# Patient Record
Sex: FEMALE | Race: WHITE | Employment: UNEMPLOYED | URBAN - METROPOLITAN AREA
[De-identification: names, ages, dates, MRNs, and addresses within clinical notes are randomized per-mention and may not be internally consistent; named-entity substitution may affect disease eponyms.]

---

## 2017-01-20 ENCOUNTER — OFFICE VISIT (OUTPATIENT)
Dept: INTERNAL MEDICINE CLINIC | Age: 57
End: 2017-01-20

## 2017-01-20 VITALS
OXYGEN SATURATION: 96 % | SYSTOLIC BLOOD PRESSURE: 146 MMHG | TEMPERATURE: 98.7 F | WEIGHT: 266 LBS | DIASTOLIC BLOOD PRESSURE: 88 MMHG | BODY MASS INDEX: 50.22 KG/M2 | HEIGHT: 61 IN | RESPIRATION RATE: 20 BRPM | HEART RATE: 87 BPM

## 2017-01-20 DIAGNOSIS — R10.2 PELVIC PAIN: Primary | ICD-10-CM

## 2017-01-20 DIAGNOSIS — R31.9 BLOOD IN URINE: ICD-10-CM

## 2017-01-20 DIAGNOSIS — R74.8 ELEVATED LIVER ENZYMES: ICD-10-CM

## 2017-01-20 LAB
BILIRUB UR QL STRIP: NEGATIVE
GLUCOSE UR-MCNC: NEGATIVE MG/DL
KETONES P FAST UR STRIP-MCNC: NEGATIVE MG/DL
PH UR STRIP: 5.5 [PH] (ref 4.6–8)
PROT UR QL STRIP: ABNORMAL MG/DL
SP GR UR STRIP: 1.01 (ref 1–1.03)
UA UROBILINOGEN AMB POC: ABNORMAL (ref 0.2–1)
URINALYSIS CLARITY POC: ABNORMAL
URINALYSIS COLOR POC: YELLOW
URINE BLOOD POC: ABNORMAL
URINE LEUKOCYTES POC: ABNORMAL
URINE NITRITES POC: NEGATIVE

## 2017-01-20 NOTE — PROGRESS NOTES
Davida Nance Azucena Chan is a 56 y.o. female and presents with Pelvic Pain  . Abdominal pain  Pt reports mid to left  lower abdomen pelvic pain severe which comes and goes. She had bad spell in December with an increased temperature but  it resolved on its on. She presents today with left lower abdominal pain and paraspinal low back pain. She has subjective fevers and took tylenol with mild relief. No pain on urination but does see blood in underwear. She notes she sees blood on her tissue sporatically about every 2-3 weeks. She thinks the last time which was December 24 was more prominent and heavy. Since h    Subjective:  wound  Improving  Not painful becoming a little itchy. She is using cerva lotion and no longer draining  She is not using mupirocin      SUBJECTIVE: Azucena Chan is a 56 y.o. female here for follow up of hypothyroidism.s   She is symptomatic with weakness, tired, depressed and crying    Lab Results   Component Value Date/Time    TSH 4.630 07/14/2016 02:19 PM     Thyroid ROS: fatigue, weight gain, constipation, swelling, feeling slow, losing hair and anxiousness. + edema  Pt will stay on levothyroxine 112 mcg as she was recently checked by endocrine. Dr. Zaria Grey is following pt      Htn  Subjective:   Azucena Chan is a 56 y.o. female with hypertension. Hypertension ROS: taking medications as instructed, no medication side effects noted, no TIA's, no chest pain on exertion, no dyspnea on exertion, no swelling of ankles. New concerns: edema, she has not used the lasix.      Cholesterol  She is now taking medication zocor  No muscle aches    Anxiety  She reports seeing dr. Martha Wisdom but he is retiring  Baseline stable per her reports  She notes her  can be verbally abusive and drinks etoh      Review of Systems  Constitutional: negative for fevers, chills, anorexia and weight loss  Eyes:   negative for visual disturbance and irritation  ENT:   negative for tinnitus,sore throat,nasal congestion,ear pains. hoarseness  Respiratory:  negative for cough, hemoptysis, dyspnea,wheezing  CV:   negative for chest pain, palpitations, lower extremity edema  GI:   negative for nausea, vomiting, diarrhea, abdominal pain,melena  Endo:               negative for polyuria,polydipsia,polyphagia,heat intolerance  Genitourinary: negative for frequency, dysuria and hematuria  Integument:  negative for rash and pruritus  Hematologic:  negative for easy bruising and gum/nose bleeding  Musculoskel: negative for myalgias, arthralgias, back pain, muscle weakness, joint pain  Neurological:  negative for headaches, dizziness, vertigo, memory problems and gait   Behavl/Psych: negative for feelings of anxiety, depression, mood changes    Past Medical History   Diagnosis Date    Anxiety     Depression 8/19/2010    HTN (hypertension)     Hyperlipidemia LDL goal < 130     Hypothyroid     Psychotic disorder      Severe depression and anxiety    Tobacco abuse      Past Surgical History   Procedure Laterality Date    Hx appendectomy      Hx dilation and curettage       Social History     Social History    Marital status:      Spouse name: N/A    Number of children: N/A    Years of education: N/A     Social History Main Topics    Smoking status: Current Every Day Smoker     Packs/day: 1.00    Smokeless tobacco: Never Used      Comment:  1 ppd/40+ years    Alcohol use No    Drug use: No      Comment: 1 pack a day    Sexual activity: No     Other Topics Concern    None     Social History Narrative            2 children        Not employed/filing for disability    Stopped working after children were born 20 yo and 11 yo     History reviewed. No pertinent family history. Current Outpatient Prescriptions   Medication Sig Dispense Refill    simvastatin (ZOCOR) 10 mg tablet Take 1 Tab by mouth nightly.  90 Tab 1    levothyroxine (SYNTHROID) 112 mcg tablet Take  by mouth Daily (before breakfast).  dilTIAZem CD (CARDIZEM CD) 180 mg ER capsule Take 1 Cap by mouth daily. 90 Cap 3    venlafaxine-SR (EFFEXOR XR) 150 mg capsule Take  by mouth daily.  citalopram (CELEXA) 40 mg tablet Take 40 mg by mouth daily.  amphetamine-dextroamphetamine (ADDERALL) 20 mg tablet Take 2 Tabs by mouth three (3) times daily. 20 Tab 0    LORazepam (ATIVAN) 1 mg tablet Take 1 Tab by mouth every eight (8) hours as needed. 60 Tab 0    quetiapine (SEROQUEL) 25 mg tablet Take  by mouth. 1-2 at bedtime as needed for sleep       furosemide (LASIX) 20 mg tablet Take 1 Tab by mouth daily. 90 Tab 0    Cholecalciferol, Vitamin D3, 50,000 unit cap Take 1 Cap by mouth every seven (7) days. 12 Cap 0    mupirocin calcium (BACTROBAN) 2 % topical cream Apply  to affected area two (2) times a day.  15 g 0     Allergies   Allergen Reactions    Egg Other (comments)     Abdominal pain       Objective:  Visit Vitals    /88 (BP 1 Location: Left arm, BP Patient Position: Sitting)    Pulse 87    Temp 98.7 °F (37.1 °C) (Oral)    Resp 20    Ht 5' 1\" (1.549 m)    Wt 266 lb (120.7 kg)    LMP 07/13/2007    SpO2 96%    BMI 50.26 kg/m2        Physical Exam:   General appearance - alert, well appearing, and in no distress and obese,tangential though exam  Mental status - alert, oriented to person, place, and time  EYE-ALMA, EOMI, fundi normal, corneas normal, no foreign bodies, visual acuity normal both eyes  Nose - normal and patent, no erythema, discharge or polyps  Mouth - mucous membranes moist, pharynx normal without lesions  Neck - supple, no significant adenopathy   Chest - clear to auscultation, no wheezes, rales or rhonchi, symmetric air entry   Heart - normal rate, regular rhythm, normal S1, S2, no murmurs, rubs, clicks or gallops   Abdomen - soft, nontender, nondistended, no masses or organomegaly  Ext-peripheral pulses normal, no pedal edema, no clubbing or cyanosis,left shin, + 2+ edema, right lower leg quarter sized crusty lesion 2 cmx2 cm, left medial knee fold,  bilateral hyperpigmentation lower legs  Skin-Warm and dry. no hyperpigmentation, vitiligo, or suspicious lesions, tan skin, Neuro -alert, oriented, normal speech, no focal findings or movement disorder noted  Neck-normal C-spine, no tenderness, full ROM without pain  Gyn: vaginal atrophy noted, appeared to be blood at os      Nahum Natarajan was seen today for pelvic pain. Diagnoses and all orders for this visit:    Pelvic pain  Does have some left paraspinal pain but does not appear to be radiating to abdomen  Exam revealed atrophy but did note erythema at os  Given age will ask gyn to evaluate  -     AMB POC URINALYSIS DIP STICK AUTO W/O MICRO  -     CULTURE, URINE  -     URINALYSIS W/ RFLX MICROSCOPIC  -     REFERRAL TO OBSTETRICS AND GYNECOLOGY    Blood in urine  Requested clean catch urine discussed procedure with pt  -     CULTURE, URINE  -     URINALYSIS W/ RFLX MICROSCOPIC  -     Cancel: URINALYSIS W/ RFLX MICROSCOPIC  -     Cancel: CULTURE, URINE  -     REFERRAL TO OBSTETRICS AND GYNECOLOGY    Elevated liver enzymes  May be fatty liver  Cont to monitor  -     METABOLIC PANEL, COMPREHENSIVE        She is going to need another psychiatrist as Dr. Ed Sims is retiring    Follow up in 1 month or sooner if needed    This note will not be viewable in [address]

## 2017-01-20 NOTE — MR AVS SNAPSHOT
Visit Information Date & Time Provider Department Dept. Phone Encounter #  
 1/20/2017 11:00 AM Karen Kemp MD [facility] [address] [id number] Upcoming Health Maintenance Date Due Hepatitis C Screening 1960 Pneumococcal 19-64 Medium Risk (1 of 1 - PPSV23) 1/16/1981 PAP AKA CERVICAL CYTOLOGY 1/16/2012 FOBT Q 1 YEAR AGE 50-75 1/16/2012 BREAST CANCER SCRN MAMMOGRAM 6/4/2016 INFLUENZA AGE 9 TO ADULT 8/1/2016 DTaP/Tdap/Td series (2 - Td) 9/10/2025 Allergies as of 1/20/2017  Review Complete On: 1/20/2017 By: Karen Kemp MD  
  
 Severity Noted Reaction Type Reactions Egg  06/04/2014    Other (comments) Abdominal pain Current Immunizations  Reviewed on 9/10/2015 Name Date Tdap 9/10/2015 Not reviewed this visit You Were Diagnosed With   
  
 Codes Comments Pelvic pain    -  Primary ICD-10-CM: R10.2 ICD-9-CM: [id number] Blood in urine     ICD-10-CM: R31.9 ICD-9-CM: 599.70 Elevated liver enzymes     ICD-10-CM: R74.8 ICD-9-CM: 790.5 Vitals BP Pulse Temp Resp Height(growth percentile) Weight(growth percentile) 146/88 (BP 1 Location: Left arm, BP Patient Position: Sitting) 87 98.7 °F (37.1 °C) (Oral) 20 5' 1\" (1.549 m) 266 lb (120.7 kg) LMP SpO2 BMI OB Status Smoking Status 07/13/2007 96% 50.26 kg/m2 Postmenopausal Current Every Day Smoker Vitals History BMI and BSA Data Body Mass Index Body Surface Area  
 50.26 kg/m 2 2.28 m 2 Preferred Pharmacy Pharmacy Name Phone [id number] [place], [address] [phone] Your Updated Medication List  
  
   
This list is accurate as of: 1/20/17 12:18 PM.  Always use your most recent med list.  
  
  
  
  
 Cholecalciferol (Vitamin D3) 50,000 unit Cap Take 1 Cap by mouth every seven (7) days. citalopram 40 mg tablet Commonly known as:  Lenward Campanile Take 40 mg by mouth daily. dextroamphetamine-amphetamine 20 mg tablet Commonly known as:  ADDERALL Take 2 Tabs by mouth three (3) times daily. dilTIAZem  mg ER capsule Commonly known as:  CARDIZEM CD Take 1 Cap by mouth daily. EFFEXOR  mg capsule Generic drug:  venlafaxine-SR Take  by mouth daily. furosemide 20 mg tablet Commonly known as:  LASIX Take 1 Tab by mouth daily. LORazepam 1 mg tablet Commonly known as:  ATIVAN Take 1 Tab by mouth every eight (8) hours as needed. mupirocin calcium 2 % topical cream  
Commonly known as:  [employer] Apply  to affected area two (2) times a day. SEROquel 25 mg tablet Generic drug:  QUEtiapine Take  by mouth. 1-2 at bedtime as needed for sleep  
  
 simvastatin 10 mg tablet Commonly known as:  ZOCOR Take 1 Tab by mouth nightly. SYNTHROID 112 mcg tablet Generic drug:  levothyroxine Take  by mouth Daily (before breakfast). We Performed the Following AMB POC URINALYSIS DIP STICK AUTO W/O MICRO [20426 CPT(R)] CULTURE, URINE [id number] CPT(R)] CULTURE, URINE [id number] CPT(R)] METABOLIC PANEL, COMPREHENSIVE [22252 CPT(R)] REFERRAL TO OBSTETRICS AND GYNECOLOGY [REF51 Custom] Comments:  
 Please evaluate for post menopausal bleed, + atrophy on exam, erythema cervical os URINALYSIS W/ RFLX MICROSCOPIC [23122 CPT(R)] URINALYSIS W/ RFLX MICROSCOPIC [34280 CPT(R)] Referral Information Referral ID Referred By Referred To  
  
 [id number] Sayre АндрейFederico boswell MD   
   [address] [place] [address] Phone: [phone] Fax: [phone] Visits Status Start Date End Date 1 New Request 1/20/17 1/20/18 If your referral has a status of pending review or denied, additional information will be sent to support the outcome of this decision. Introducing \[facility] [place] [facility]\"" & HEALTH SERVICES! Dear Ortiz Jackson: Thank you for requesting a [employer] account. Our records indicate that you already have an active [employer] account. You can access your account anytime at [email or web address] Did you know that you can access your hospital and ER discharge instructions at any time in [employer]? You can also review all of your test results from your hospital stay or ER visit. Additional Information If you have questions, please visit the Frequently Asked Questions section of the [employer] website at [email or web address] Remember, [employer] is NOT to be used for urgent needs. For medical emergencies, dial 911. Now available from your iPhone and Android! Please provide this summary of care documentation to your next provider. Your primary care clinician is listed as Jorge Michaels. If you have any questions after today's visit, please call [phone].

## 2017-01-21 LAB
ALBUMIN SERPL-MCNC: 4.3 G/DL (ref 3.5–5.5)
ALBUMIN/GLOB SERPL: 1.8 {RATIO} (ref 1.1–2.5)
ALP SERPL-CCNC: 129 IU/L (ref 39–117)
ALT SERPL-CCNC: 16 IU/L (ref 0–32)
AST SERPL-CCNC: 21 IU/L (ref 0–40)
BILIRUB SERPL-MCNC: 0.6 MG/DL (ref 0–1.2)
BUN SERPL-MCNC: 13 MG/DL (ref 6–24)
BUN/CREAT SERPL: 14 (ref 9–23)
CALCIUM SERPL-MCNC: 9.1 MG/DL (ref 8.7–10.2)
CHLORIDE SERPL-SCNC: 97 MMOL/L (ref 96–106)
CO2 SERPL-SCNC: 24 MMOL/L (ref 18–29)
CREAT SERPL-MCNC: 0.92 MG/DL (ref 0.57–1)
GLOBULIN SER CALC-MCNC: 2.4 G/DL (ref 1.5–4.5)
GLUCOSE SERPL-MCNC: 84 MG/DL (ref 65–99)
POTASSIUM SERPL-SCNC: 4.5 MMOL/L (ref 3.5–5.2)
PROT SERPL-MCNC: 6.7 G/DL (ref 6–8.5)
SODIUM SERPL-SCNC: 139 MMOL/L (ref 134–144)

## 2017-01-23 LAB
APPEARANCE UR: ABNORMAL
BACTERIA #/AREA URNS HPF: ABNORMAL /[HPF]
BACTERIA UR CULT: ABNORMAL
BILIRUB UR QL STRIP: NEGATIVE
CASTS URNS QL MICRO: ABNORMAL /LPF
COLOR UR: YELLOW
EPI CELLS #/AREA URNS HPF: ABNORMAL /HPF
GLUCOSE UR QL: NEGATIVE
HGB UR QL STRIP: ABNORMAL
KETONES UR QL STRIP: NEGATIVE
LEUKOCYTE ESTERASE UR QL STRIP: ABNORMAL
MICRO URNS: ABNORMAL
MUCOUS THREADS URNS QL MICRO: PRESENT
NITRITE UR QL STRIP: NEGATIVE
PH UR STRIP: 6 [PH] (ref 5–7.5)
PROT UR QL STRIP: ABNORMAL
RBC #/AREA URNS HPF: ABNORMAL /HPF
SP GR UR: 1.02 (ref 1–1.03)
UROBILINOGEN UR STRIP-MCNC: 0.2 MG/DL (ref 0.2–1)
WBC #/AREA URNS HPF: >30 /HPF

## 2017-01-23 RX ORDER — SULFAMETHOXAZOLE AND TRIMETHOPRIM 800; 160 MG/1; MG/1
1 TABLET ORAL 2 TIMES DAILY
Qty: 14 TAB | Refills: 0 | Status: SHIPPED | OUTPATIENT
Start: 2017-01-23 | End: 2017-02-02

## 2017-01-24 NOTE — PROGRESS NOTES
Called pt at her home number and mobile number, she did not answer, left v/m for pt stating PCP sent in an abx for her.

## 2017-01-24 NOTE — PROGRESS NOTES
Please contact pt to let her know I sent in an antibiotic.   I already left her on a message on her voice mail so want to ensure she is aware. [email or web address]

## 2017-02-02 ENCOUNTER — OFFICE VISIT (OUTPATIENT)
Dept: OBGYN CLINIC | Age: 57
End: 2017-02-02

## 2017-02-02 VITALS
WEIGHT: 263.4 LBS | DIASTOLIC BLOOD PRESSURE: 93 MMHG | BODY MASS INDEX: 49.73 KG/M2 | HEIGHT: 61 IN | SYSTOLIC BLOOD PRESSURE: 153 MMHG

## 2017-02-02 DIAGNOSIS — N76.1 CHRONIC VAGINITIS: Primary | ICD-10-CM

## 2017-02-02 RX ORDER — HYDROCODONE BITARTRATE AND ACETAMINOPHEN 5; 325 MG/1; MG/1
TABLET ORAL
Refills: 0 | COMMUNITY
Start: 2016-10-28 | End: 2017-02-02

## 2017-02-02 RX ORDER — CITALOPRAM 20 MG/1
TABLET, FILM COATED ORAL
COMMUNITY
Start: 2017-01-15 | End: 2017-06-12 | Stop reason: SDUPTHER

## 2017-02-02 RX ORDER — ERGOCALCIFEROL 1.25 MG/1
CAPSULE ORAL
Refills: 0 | COMMUNITY
Start: 2016-10-26 | End: 2017-02-02

## 2017-02-02 NOTE — PATIENT INSTRUCTIONS
Pelvic Exam: Care Instructions  Your Care Instructions    When your doctor examines all of your pelvic organs, it's called a pelvic exam. Two good reasons to have this kind of exam are to check for sexually transmitted infections (STIs) and to get a Pap test. A Pap test is also called a Pap smear. It checks for early changes that can lead to cancer of the cervix. Sometimes a pelvic exam is part of a regular checkup. In this case, you can do some things to make your test results as accurate as possible. · Try to schedule the exam when you don't have your period. · Don't use douches, tampons, or vaginal medicines, sprays, or powders for 24 hours before your exam.  · Don't have sex for 24 hours before your exam.  Other times, women have this kind of exam at any time of the month. This is because they have pelvic pain, bleeding, or discharge. Or they may have another pelvic problem. Before your exam, it's important to share some information with your doctor. For example, if you are a survivor of rape or sexual abuse, you can talk about any concerns you may have. Your doctor will also want to know if you are pregnant or use birth control. And he or she will want to hear about any problems, surgeries, or procedures you have had in your pelvic area. You will also need to tell your doctor when your last period was. Follow-up care is a key part of your treatment and safety. Be sure to make and go to all appointments, and call your doctor if you are having problems. It's also a good idea to know your test results and keep a list of the medicines you take. How is a pelvic exam done? · During a pelvic exam, you will:  ¨ Take off your clothes below the waist. You will get a paper or cloth cover to put over the lower half of your body. Kat Noriegaors on your back on an exam table. Your feet will be raised above you. Stirrups will support your feet. · The doctor will:  Mayra Mcknightsper you to relax your knees.  Your knees need to lean out, toward the walls. ¨ Check the opening of your vagina for sores or swelling. ¨ Gently put a tool called a speculum into your vagina. It opens the vagina a little bit. You will feel some pressure. But if you are relaxed, it will not hurt. It lets your doctor see inside the vagina. ¨ Use a small brush, spatula, or swab to get a sample of cells, if you are having a Pap test or culture. The doctor then removes the speculum. ¨ Put on gloves and put one or two fingers of one hand into your vagina. The other hand goes on your lower belly. This lets your doctor feel your pelvic organs. You will probably feel some pressure. Try to stay relaxed. ¨ Put one gloved finger into your rectum and one into your vagina, if needed. This can also help check your pelvic organs. This exam takes about 10 minutes. At the end, you will get a washcloth or tissue to clean your vaginal area. It's normal to have some discharge after this exam. You can then get dressed. Some test results may be ready right away. But results from a culture or a Pap test may take several days or a few weeks. Why should you have a pelvic exam?  · You want to have recommended screening tests. This includes a Pap test.  · You think you have a vaginal infection. Signs include itching, burning, or unusual discharge. · You might have been exposed to a sexually transmitted infection (STI), such as chlamydia or herpes. · You have vaginal bleeding that is not part of your normal menstrual period. · You have pain in your belly or pelvis. · You have been sexually assaulted. A pelvic exam lets your doctor collect evidence and check for STIs. · You are pregnant. · You are having trouble getting pregnant. What are the risks of a pelvic exam?  There are no risks from a pelvic exam.  When should you call for help?   Watch closely for changes in your health, and be sure to contact your doctor if:  · You have heavy bleeding or discharge from your vagina after the exam.  Where can you learn more? Go to http://lavelle-rhys.info/. Enter L731 in the search box to learn more about \"Pelvic Exam: Care Instructions. \"  Current as of: February 25, 2016  Content Version: 11.1  © 6667-9037 [employer]. Care instructions adapted under license by [employer] (which disclaims liability or warranty for this information). If you have questions about a medical condition or this instruction, always ask your healthcare professional. [address] any warranty or liability for your use of this information.

## 2017-02-02 NOTE — PROGRESS NOTES
Postmenopausal bleeding note      Femi Ceballos is a 56 y.o. female who complains of vaginal bleeding after menopause. She became menopausal years ago. She has had vaginal bleeding which she describes as light lasting up to 5 months. Pad or tampon count: changes as needed. Associated symptoms include lower abdominal pain. US today shows normal uterus and endometrial lining is less than 4 mm. Ovaries normal.    Alleviating factors: none    Aggravating factors: none      The patient is not currently sexually active. Last Pap smear:unknown    Her relevant past medical history:   Past Medical History   Diagnosis Date    Anxiety     Depression 8/19/2010    HTN (hypertension)     Hyperlipidemia LDL goal < 130     Hypothyroid     Psychotic disorder      Severe depression and anxiety    Tobacco abuse         Past Surgical History   Procedure Laterality Date    Hx appendectomy      Hx dilation and curettage       Social History     Occupational History    Not on file. Social History Main Topics    Smoking status: Current Every Day Smoker     Packs/day: 1.00    Smokeless tobacco: Never Used      Comment:  1 ppd/40+ years    Alcohol use No    Drug use: No      Comment: 1 pack a day    Sexual activity: No     No family history on file. Allergies   Allergen Reactions    Egg Other (comments)     Abdominal pain     Prior to Admission medications    Medication Sig Start Date End Date Taking? Authorizing Provider   citalopram (CELEXA) 20 mg tablet  1/15/17  Yes Historical Provider   simvastatin (ZOCOR) 10 mg tablet Take 1 Tab by mouth nightly. 11/21/16  Yes Rosalind Jensen MD   levothyroxine (SYNTHROID) 112 mcg tablet Take  by mouth Daily (before breakfast). Yes Historical Provider   dilTIAZem CD (CARDIZEM CD) 180 mg ER capsule Take 1 Cap by mouth daily.  10/26/16  Yes Rosalind Jensen MD   Cholecalciferol, Vitamin D3, 50,000 unit cap Take 1 Cap by mouth every seven (7) days. 10/26/16  Yes Kian Dee MD   venlafaxine-SR (EFFEXOR XR) 150 mg capsule Take  by mouth daily. Yes Historical Provider   amphetamine-dextroamphetamine (ADDERALL) 20 mg tablet Take 2 Tabs by mouth three (3) times daily. 8/17/12  Yes Angelina Lion MD   LORazepam (ATIVAN) 1 mg tablet Take 1 Tab by mouth every eight (8) hours as needed. 7/2/12  Yes Angelina Lion MD   quetiapine (SEROQUEL) 25 mg tablet Take  by mouth. 1-2 at bedtime as needed for sleep    Yes Historical Provider   ergocalciferol (ERGOCALCIFEROL) 50,000 unit capsule TAKE ONE CAPSULE BY MOUTH EVERY 7 DAYS 10/26/16   Historical Provider   HYDROcodone-acetaminophen (NORCO) 5-325 mg per tablet TAKE 1 TABLET BY MOUTH EVERY 6 HOURS AS NEEDED FOR PAIN 10/28/16   Historical Provider   trimethoprim-sulfamethoxazole (BACTRIM DS, SEPTRA DS) 160-800 mg per tablet Take 1 Tab by mouth two (2) times a day. 1/23/17   Kian Dee MD   furosemide (LASIX) 20 mg tablet Take 1 Tab by mouth daily. 11/7/16   Kian eDe MD   mupirocin calcium (BACTROBAN) 2 % topical cream Apply  to affected area two (2) times a day. 8/4/16   Kian Dee MD   citalopram (CELEXA) 40 mg tablet Take 40 mg by mouth daily.  5/22/14   Historical Provider        Review of Systems - History obtained from the patient  Constitutional: negative for weight loss, fever, night sweats  HEENT: negative for hearing loss, earache, congestion, snoring, sorethroat  CV: negative for chest pain, palpitations, edema  Resp: negative for cough, shortness of breath, wheezing  Breast: negative for breast lumps, nipple discharge, galactorrhea  GI: negative for change in bowel habits, abdominal pain, black or bloody stools  : negative for frequency, dysuria, hematuria  MSK: negative for back pain, joint pain, muscle pain  Skin: negative for itching, rash, hives  Neuro: negative for dizziness, headache, confusion, weakness  Psych: negative for anxiety, depression, change in mood  Heme/lymph: negative for bleeding, bruising, pallor      Objective:  Visit Vitals    BP (!) 153/93 (BP 1 Location: Right arm, BP Patient Position: Sitting)    Ht 5' 1\" (1.549 m)    Wt 263 lb 6.4 oz (119.5 kg)    LMP 07/13/2007    BMI 49.77 kg/m2       Physical Exam:   PHYSICAL EXAMINATION    Constitutional  · Appearance: well-nourished, well developed, alert, in no acute distress    Gastrointestinal  · Abdominal Examination: abdomen non-tender to palpation, normal bowel sounds, no masses present  · Liver and spleen: no hepatomegaly present, spleen not palpable  · Hernias: no hernias identified    Genitourinary  · External Genitalia: normal appearance for age, no discharge present, no tenderness present, no inflammatory lesions present, no masses present, no atrophy present  · Vagina: normal vaginal vault without central or paravaginal defects, no discharge present, no inflammatory lesions present, no masses present  · Bladder: non-tender to palpation  · Urethra: appears normal  · Cervix: normal with some cervicitis evident  · Uterus: normal size, shape and consistency  · Adnexa: no adnexal tenderness present, no adnexal masses present  · Perineum: perineum within normal limits, no evidence of trauma, no rashes or skin lesions present  · Anus: anus within normal limits, no hemorrhoids present  · Inguinal Lymph Nodes: no lymphadenopathy present    Skin  · General Inspection: rash and ulcerative lesion on right lower leg    Neurologic/Psychiatric  · Mental Status:  · Orientation: grossly oriented to person, place and time  · Mood and Affect: mood normal, affect appropriate    Assessment:   Postmenopausal bleeding likely from cervicitis; US shows endometrial lining less than 4 mm    Plan:   Pap sent  Cultures sent from cervix and vagina  She declines endometrial biopsy  She will see GI/urology for her ongoing abdominal pain        Instructions given to pt.

## 2017-02-05 LAB — BACTERIA GENITAL AEROBE CULT: NORMAL

## 2017-02-06 LAB
A VAGINAE DNA VAG QL NAA+PROBE: NORMAL SCORE
BVAB2 DNA VAG QL NAA+PROBE: NORMAL SCORE
C ALBICANS DNA VAG QL NAA+PROBE: NEGATIVE
C GLABRATA DNA VAG QL NAA+PROBE: NEGATIVE
C TRACH RRNA SPEC QL NAA+PROBE: NEGATIVE
MEGA1 DNA VAG QL NAA+PROBE: NORMAL SCORE
N GONORRHOEA RRNA SPEC QL NAA+PROBE: NEGATIVE
T VAGINALIS RRNA SPEC QL NAA+PROBE: NEGATIVE

## 2017-02-08 LAB
CYTOLOGIST CVX/VAG CYTO: NORMAL
CYTOLOGY CVX/VAG DOC THIN PREP: NORMAL
CYTOLOGY HISTORY:: NORMAL
DX ICD CODE: NORMAL
HPV I/H RISK 1 DNA CVX QL PROBE+SIG AMP: NEGATIVE
Lab: NORMAL
OTHER STN SPEC: NORMAL
PATH REPORT.FINAL DX SPEC: NORMAL
STAT OF ADQ CVX/VAG CYTO-IMP: NORMAL

## 2017-02-09 RX ORDER — CLINDAMYCIN PHOSPHATE 20 MG/G
1 CREAM VAGINAL
Qty: 40 G | Refills: 0 | Status: SHIPPED | OUTPATIENT
Start: 2017-02-09 | End: 2017-02-16

## 2017-02-09 NOTE — TELEPHONE ENCOUNTER
----- Message from Sophia Blanco MD sent at 2/9/2017 10:28 AM EST -----  Pap and all cultures are negative. She had what appeared to be some inflammation on the cervix likely causing her spotting. I recommend using and antibiotic cream, Cleocin vaginal cream QHS for 7 nights, to treat the suspected inflammation. If she continues to see spotting in the next few months then RTO for repeat exam and endometrial biopsy.

## 2017-02-09 NOTE — PROGRESS NOTES
Pap and all cultures are negative. She had what appeared to be some inflammation on the cervix likely causing her spotting. I recommend using and antibiotic cream, Cleocin vaginal cream QHS for 7 nights, to treat the suspected inflammation. If she continues to see spotting in the next few months then RTO for repeat exam and endometrial biopsy.

## 2017-02-09 NOTE — TELEPHONE ENCOUNTER
Patient notified of MD keys's     Patient had multiple question regarding the wording of her medical record, including the use of the word grossly in terms of her orientation. I assured the patient that the medical terminology was accurate and I reviewed the visit as she was discussing her concerns. The note appears appropriate and does not include misused language. Patient also questioning what type of cream is Cleocin. I reviewed the [facility] entry with the patient. She also was concerned about a biopsy and having \"something snapped off. \" I explained how an endometrial biopsy was done and also advised her that if she does need to have it done, to take NSAID's or tylenol before procedure.     Verbalized understanding    Medication pended for signature and pharmacy verified

## 2017-02-15 NOTE — TELEPHONE ENCOUNTER
Patient's doctor retired in December and hasn't been able to get her medication refilled. Patient  to see her Dr. Reji Lawrence with [facility] on March 2nd and would like to have this just for 30 days. She took her last pill today. Patient would like a call back this afternoon if this won't be filled.   [phone]

## 2017-02-16 RX ORDER — DEXTROAMPHETAMINE SACCHARATE, AMPHETAMINE ASPARTATE, DEXTROAMPHETAMINE SULFATE AND AMPHETAMINE SULFATE 5; 5; 5; 5 MG/1; MG/1; MG/1; MG/1
40 TABLET ORAL 3 TIMES DAILY
Qty: 160 TAB | Refills: 0 | Status: SHIPPED | OUTPATIENT
Start: 2017-02-16 | End: 2017-03-07 | Stop reason: SDUPTHER

## 2017-02-17 ENCOUNTER — TELEPHONE (OUTPATIENT)
Dept: INTERNAL MEDICINE CLINIC | Age: 57
End: 2017-02-17

## 2017-02-17 NOTE — TELEPHONE ENCOUNTER
----- Message from Ayleen Roach sent at 2/17/2017  1:09 PM EST -----  Regarding: /telephone  Pt is calling in reference to a situation she called about on Wednesday. Dr. Suraj Urena wrote a prescription for the pt that was left at the  for the pt to . Pt appreciates the time that  [address] [place] [address] and the nurse took to help her,and pt said, \"Thank  you so much\". Pt no longer needs the prescription. Pt was seen by  [facility] and a physician there was able to prescribe the medication. Pt would a return phone call to confirm that this message was received. Pt can be reached at [phone], please leave a message if there is no answer.

## 2017-03-07 ENCOUNTER — TELEPHONE (OUTPATIENT)
Dept: INTERNAL MEDICINE CLINIC | Age: 57
End: 2017-03-07

## 2017-03-07 NOTE — TELEPHONE ENCOUNTER
----- Message from Reyes Peacock sent at 3/7/2017  1:24 PM EST -----  Regarding: Dr Kaley Brown  Pt's mom Bay Arce would like to speak to the doctor, regarding her daughter, please call [phone]

## 2017-03-08 RX ORDER — DEXTROAMPHETAMINE SACCHARATE, AMPHETAMINE ASPARTATE, DEXTROAMPHETAMINE SULFATE AND AMPHETAMINE SULFATE 5; 5; 5; 5 MG/1; MG/1; MG/1; MG/1
40 TABLET ORAL 3 TIMES DAILY
Qty: 160 TAB | Refills: 0 | Status: SHIPPED | OUTPATIENT
Start: 2017-03-08 | End: 2017-04-18 | Stop reason: SDUPTHER

## 2017-03-13 ENCOUNTER — DOCUMENTATION ONLY (OUTPATIENT)
Dept: INTERNAL MEDICINE CLINIC | Age: 57
End: 2017-03-13

## 2017-03-16 ENCOUNTER — OFFICE VISIT (OUTPATIENT)
Dept: INTERNAL MEDICINE CLINIC | Age: 57
End: 2017-03-16

## 2017-03-16 VITALS
RESPIRATION RATE: 22 BRPM | TEMPERATURE: 98.6 F | WEIGHT: 268 LBS | SYSTOLIC BLOOD PRESSURE: 150 MMHG | HEART RATE: 86 BPM | HEIGHT: 61 IN | BODY MASS INDEX: 50.6 KG/M2 | DIASTOLIC BLOOD PRESSURE: 92 MMHG | OXYGEN SATURATION: 96 %

## 2017-03-16 DIAGNOSIS — I10 ESSENTIAL HYPERTENSION: ICD-10-CM

## 2017-03-16 DIAGNOSIS — E78.00 HYPERCHOLESTEREMIA: ICD-10-CM

## 2017-03-16 DIAGNOSIS — F98.8 ADD (ATTENTION DEFICIT DISORDER): Primary | ICD-10-CM

## 2017-03-16 NOTE — PROGRESS NOTES
Talisha Santamaria Jovan Funes is a 56 y.o. female and presents with Medication Evaluation  . Pt presents to follow up on psych meds and htn. Add  Dr. James Baker has retired. She saw Dr. Claude Duval but he wanted to change her meds at the first meeting. She has refills of her meds and will see Dr. Samra Maynard. No si/no hi and has been stable for the past year. She is currently taking medications as dr. Villa Never rx'd. Abdominal pain  No complaints    Subjective:  wound  improved      SUBJECTIVE: Jovan Funes is a 56 y.o. female here for follow up of hypothyroidism.s   She is symptomatic with weakness, tired, depressed and crying    Lab Results   Component Value Date/Time    TSH 4.630 07/14/2016 02:19 PM     Thyroid ROS: fatigue, weight gain, constipation, swelling, feeling slow, losing hair and anxiousness. + edema  Pt will stay on levothyroxine 112 mcg as she was recently checked by endocrine. Dr. Allison Plan is following pt      Htn  Subjective:   Jovan Funes is a 56 y.o. female with hypertension. Hypertension ROS: taking medications as instructed, no medication side effects noted, no TIA's, no chest pain on exertion, no dyspnea on exertion, no swelling of ankles. New concerns: none    Cholesterol  She is now taking medication zocor  No muscle aches          Review of Systems  Constitutional: negative for fevers, chills, anorexia and weight loss  Eyes:   negative for visual disturbance and irritation  ENT:   negative for tinnitus,sore throat,nasal congestion,ear pains. hoarseness  Respiratory:  negative for cough, hemoptysis, dyspnea,wheezing  CV:   negative for chest pain, palpitations, lower extremity edema  GI:   negative for nausea, vomiting, diarrhea, abdominal pain,melena  Endo:               negative for polyuria,polydipsia,polyphagia,heat intolerance  Genitourinary: negative for frequency, dysuria and hematuria  Integument:  negative for rash and pruritus  Hematologic:  negative for easy bruising and gum/nose bleeding  Musculoskel: negative for myalgias, arthralgias, back pain, muscle weakness, joint pain  Neurological:  negative for headaches, dizziness, vertigo, memory problems and gait   Behavl/Psych: negative for feelings of anxiety, depression, mood changes    Past Medical History:   Diagnosis Date    Anxiety     Depression 8/19/2010    HTN (hypertension)     Hyperlipidemia LDL goal < 130     Hypothyroid     Psychotic disorder     Severe depression and anxiety    Tobacco abuse      Past Surgical History:   Procedure Laterality Date    HX APPENDECTOMY      HX DILATION AND CURETTAGE       Social History     Social History    Marital status:      Spouse name: N/A    Number of children: N/A    Years of education: N/A     Social History Main Topics    Smoking status: Current Every Day Smoker     Packs/day: 1.00    Smokeless tobacco: Never Used      Comment:  1 ppd/40+ years    Alcohol use No    Drug use: No      Comment: 1 pack a day    Sexual activity: No     Other Topics Concern    None     Social History Narrative            2 children        Not employed/filing for disability    Stopped working after children were born 24 yo and 13 yo     No family history on file. Current Outpatient Prescriptions   Medication Sig Dispense Refill    dextroamphetamine-amphetamine (ADDERALL) 20 mg tablet Take 2 Tabs (40 mg total) by mouth three (3) times dailyEarliest Fill Date: 3/8/17. 160 Tab 0    citalopram (CELEXA) 20 mg tablet       simvastatin (ZOCOR) 10 mg tablet Take 1 Tab by mouth nightly. 90 Tab 1    levothyroxine (SYNTHROID) 112 mcg tablet Take  by mouth Daily (before breakfast).  dilTIAZem CD (CARDIZEM CD) 180 mg ER capsule Take 1 Cap by mouth daily. 90 Cap 3    venlafaxine-SR (EFFEXOR XR) 150 mg capsule Take  by mouth daily.  LORazepam (ATIVAN) 1 mg tablet Take 1 Tab by mouth every eight (8) hours as needed.  60 Tab 0    quetiapine (SEROQUEL) 25 mg tablet Take  by mouth. 1-2 at bedtime as needed for sleep       Cholecalciferol, Vitamin D3, 50,000 unit cap Take 1 Cap by mouth every seven (7) days. 12 Cap 0     Allergies   Allergen Reactions    Egg Other (comments)     Abdominal pain       Objective:  Visit Vitals    BP (!) 150/92 (BP 1 Location: Left arm, BP Patient Position: Sitting)    Pulse 86    Temp 98.6 °F (37 °C) (Oral)    Resp 22    Ht 5' 1\" (1.549 m)    Wt 268 lb (121.6 kg)    LMP 07/13/2007    SpO2 96%    BMI 50.64 kg/m2        Physical Exam:   General appearance - alert, well appearing, and in no distress and obese,tangential though exam  Mental status - alert, oriented to person, place, and time  EYE-ALMA, EOMI, fundi normal, corneas normal, no foreign bodies, visual acuity normal both eyes  Nose - normal and patent, no erythema, discharge or polyps  Mouth - mucous membranes moist, pharynx normal without lesions  Neck - supple, no significant adenopathy   Chest - clear to auscultation, no wheezes, rales or rhonchi, symmetric air entry   Heart - normal rate, regular rhythm, normal S1, S2, no murmurs, rubs, clicks or gallops   Abdomen - soft, nontender, nondistended, no masses or organomegaly  Ext-peripheral pulses normal, no pedal edema, no clubbing or cyanosis,left shin no edema, right lower leg flat macule, + white quarter sized macule no erythema,  bilateral hyperpigmentation lower legs  Skin-Warm and dry. no hyperpigmentation, vitiligo, or suspicious lesions, tan skin, Neuro -alert, oriented, normal speech, no focal findings or movement disorder noted  Neck-normal C-spine, no tenderness, full ROM without pain  Gyn: vaginal atrophy noted, appeared to be blood at os    Ning Iniguez was seen today for medication evaluation. Diagnoses and all orders for this visit:    ADD (attention deficit disorder)/depression  Pt is clinically stable and think she should stay on her current meds now until she gets reassessed by psychiatry.    Completed form with pt for her pre authorization for adderall noted dr. Astrid Mclean retiring and will see new psychiatrist.  She will get reevatuated. She reports she was put on adderall for depression initially but also has add. She will get psych testing  -     REFERRAL TO PSYCHOLOGY    Essential hypertension  Stable cont meds    Hypercholesteremia  Cont meds      This note will not be viewable in MyChart.     I

## 2017-03-16 NOTE — MR AVS SNAPSHOT
Visit Information Date & Time Provider Department Dept. Phone Encounter #  
 3/16/2017 11:45 AM Kerri Cason MD [facility] [address] [id number] Upcoming Health Maintenance Date Due Hepatitis C Screening 1960 Pneumococcal 19-64 Medium Risk (1 of 1 - PPSV23) 1/16/1981 FOBT Q 1 YEAR AGE 50-75 1/16/2012 BREAST CANCER SCRN MAMMOGRAM 6/4/2016 INFLUENZA AGE 9 TO ADULT 8/1/2016 PAP AKA CERVICAL CYTOLOGY 2/2/2020 DTaP/Tdap/Td series (2 - Td) 9/10/2025 Allergies as of 3/16/2017  Review Complete On: 3/16/2017 By: Kerri Cason MD  
  
 Severity Noted Reaction Type Reactions Egg  06/04/2014    Other (comments) Abdominal pain Current Immunizations  Reviewed on 9/10/2015 Name Date Tdap 9/10/2015 Not reviewed this visit You Were Diagnosed With   
  
 Codes Comments ADD (attention deficit disorder)    -  Primary ICD-10-CM: F98.8 ICD-9-CM: 314.00 Essential hypertension     ICD-10-CM: I10 
ICD-9-CM: 401.9 Hypercholesteremia     ICD-10-CM: E78.00 ICD-9-CM: 272.0 Vitals BP Pulse Temp Resp Height(growth percentile) Weight(growth percentile) (!) 150/92 (BP 1 Location: Left arm, BP Patient Position: Sitting) 86 98.6 °F (37 °C) (Oral) 22 5' 1\" (1.549 m) 268 lb (121.6 kg) LMP SpO2 BMI OB Status Smoking Status 07/13/2007 96% 50.64 kg/m2 Postmenopausal Current Every Day Smoker Vitals History BMI and BSA Data Body Mass Index Body Surface Area  
 50.64 kg/m 2 2.29 m 2 Preferred Pharmacy Pharmacy Name Phone [id number] [place] [address] [facility] [phone] Your Updated Medication List  
  
   
This list is accurate as of: 3/16/17 12:39 PM.  Always use your most recent med list.  
  
  
  
  
 Cholecalciferol (Vitamin D3) 50,000 unit Cap Take 1 Cap by mouth every seven (7) days. citalopram 20 mg tablet Commonly known as:  CELEXA  
  
 dextroamphetamine-amphetamine 20 mg tablet Commonly known as:  ADDERALL Take 2 Tabs (40 mg total) by mouth three (3) times dailyEarliest Fill Date: 3/8/17. dilTIAZem  mg ER capsule Commonly known as:  CARDIZEM CD Take 1 Cap by mouth daily. EFFEXOR  mg capsule Generic drug:  venlafaxine-SR Take  by mouth daily. LORazepam 1 mg tablet Commonly known as:  ATIVAN Take 1 Tab by mouth every eight (8) hours as needed. SEROquel 25 mg tablet Generic drug:  QUEtiapine Take  by mouth. 1-2 at bedtime as needed for sleep  
  
 simvastatin 10 mg tablet Commonly known as:  ZOCOR Take 1 Tab by mouth nightly. SYNTHROID 112 mcg tablet Generic drug:  levothyroxine Take  by mouth Daily (before breakfast). We Performed the Following REFERRAL TO PSYCHOLOGY [DTE47 Custom] Comments:  
 Please evaluate for add Referral Information Referral ID Referred By Referred To  
  
 [id number] [address] [place] [address], [address] [place] [address] [facility] [address] [facility], [address] [place] [address] Phone: [phone] Fax: [phone] Visits Status Start Date End Date 1 New Request 3/16/17 3/16/18 If your referral has a status of pending review or denied, additional information will be sent to support the outcome of this decision. Patient Instructions High Blood Pressure: Care Instructions Your Care Instructions If your blood pressure is usually above 140/90, you have high blood pressure, or hypertension. That means the top number is 140 or higher or the bottom number is 90 or higher, or both. Despite what a lot of people think, high blood pressure usually doesn't cause headaches or make you feel dizzy or lightheaded. It usually has no symptoms.  But it does increase your risk for heart attack, stroke, and kidney or eye damage. The higher your blood pressure, the more your risk increases. Your doctor will give you a goal for your blood pressure. Your goal will be based on your health and your age. An example of a goal is to keep your blood pressure below 140/90. Lifestyle changes, such as eating healthy and being active, are always important to help lower blood pressure. You might also take medicine to reach your blood pressure goal. 
Follow-up care is a key part of your treatment and safety. Be sure to make and go to all appointments, and call your doctor if you are having problems. It's also a good idea to know your test results and keep a list of the medicines you take. How can you care for yourself at home? Medical treatment · If you stop taking your medicine, your blood pressure will go back up. You may take one or more types of medicine to lower your blood pressure. Be safe with medicines. Take your medicine exactly as prescribed. Call your doctor if you think you are having a problem with your medicine. · Talk to your doctor before you start taking aspirin every day. Aspirin can help certain people lower their risk of a heart attack or stroke. But taking aspirin isn't right for everyone, because it can cause serious bleeding. · See your doctor regularly. You may need to see the doctor more often at first or until your blood pressure comes down. · If you are taking blood pressure medicine, talk to your doctor before you take decongestants or anti-inflammatory medicine, such as ibuprofen. Some of these medicines can raise blood pressure. · Learn how to check your blood pressure at home. Lifestyle changes · Stay at a healthy weight. This is especially important if you put on weight around the waist. Losing even 10 pounds can help you lower your blood pressure. · If your doctor recommends it, get more exercise. Walking is a good choice. Bit by bit, increase the amount you walk every day.  Try for at least 30 minutes on most days of the week. You also may want to swim, bike, or do other activities. · Avoid or limit alcohol. Talk to your doctor about whether you can drink any alcohol. · Try to limit how much sodium you eat to less than 2,300 milligrams (mg) a day. Your doctor may ask you to try to eat less than 1,500 mg a day. · Eat plenty of fruits (such as bananas and oranges), vegetables, legumes, whole grains, and low-fat dairy products. · Lower the amount of saturated fat in your diet. Saturated fat is found in animal products such as milk, cheese, and meat. Limiting these foods may help you lose weight and also lower your risk for heart disease. · Do not smoke. Smoking increases your risk for heart attack and stroke. If you need help quitting, talk to your doctor about stop-smoking programs and medicines. These can increase your chances of quitting for good. When should you call for help? Call 911 anytime you think you may need emergency care. This may mean having symptoms that suggest that your blood pressure is causing a serious heart or blood vessel problem. Your blood pressure may be over 180/110. For example, call 911 if: 
· You have symptoms of a heart attack. These may include: ¨ Chest pain or pressure, or a strange feeling in the chest. 
¨ Sweating. ¨ Shortness of breath. ¨ Nausea or vomiting. ¨ Pain, pressure, or a strange feeling in the back, neck, jaw, or upper belly or in one or both shoulders or arms. ¨ Lightheadedness or sudden weakness. ¨ A fast or irregular heartbeat. · You have symptoms of a stroke. These may include: 
¨ Sudden numbness, tingling, weakness, or loss of movement in your face, arm, or leg, especially on only one side of your body. ¨ Sudden vision changes. ¨ Sudden trouble speaking. ¨ Sudden confusion or trouble understanding simple statements. ¨ Sudden problems with walking or balance. ¨ A sudden, severe headache that is different from past headaches. · You have severe back or belly pain. Do not wait until your blood pressure comes down on its own. Get help right away. Call your doctor now or seek immediate care if: 
· Your blood pressure is much higher than normal (such as 180/110 or higher), but you don't have symptoms. · You think high blood pressure is causing symptoms, such as: ¨ Severe headache. ¨ Blurry vision. Watch closely for changes in your health, and be sure to contact your doctor if: 
· Your blood pressure measures 140/90 or higher at least 2 times. That means the top number is 140 or higher or the bottom number is 90 or higher, or both. · You think you may be having side effects from your blood pressure medicine. · Your blood pressure is usually normal, but it goes above normal at least 2 times. Where can you learn more? Go to http://lavelle-rhys.info/. Enter D688 in the search box to learn more about \"High Blood Pressure: Care Instructions. \" Current as of: August 8, 2016 Content Version: 11.1 © 3055-6627 [employer]. Care instructions adapted under license by [employer] (which disclaims liability or warranty for this information). If you have questions about a medical condition or this instruction, always ask your healthcare professional. [address] any warranty or liability for your use of this information. Introducing \[facility]\"" & HEALTH SERVICES! Dear Sav Koehler: Thank you for requesting a At[employer] account. Our records indicate that you already have an active [employer] account. You can access your account anytime at [email or web address] Did you know that you can access your hospital and ER discharge instructions at any time in Attero? You can also review all of your test results from your hospital stay or ER visit. Additional Information If you have questions, please visit the Frequently Asked Questions section of the [employer] website at [email or web address] [email or web address]. [email or web address] Remember, [employer] is NOT to be used for urgent needs. For medical emergencies, dial 911. Now available from your iPhone and Android! Please provide this summary of care documentation to your next provider. Your primary care clinician is listed as Christopher Adan. If you have any questions after today's visit, please call [phone].

## 2017-03-16 NOTE — PATIENT INSTRUCTIONS

## 2017-03-28 ENCOUNTER — TELEPHONE (OUTPATIENT)
Dept: INTERNAL MEDICINE CLINIC | Age: 57
End: 2017-03-28

## 2017-03-28 NOTE — TELEPHONE ENCOUNTER
Patient would like a call back in regards to prior auth fir her adderall. Her insurance compnay is stating that they need more information. Patient will be out of medication by the end of this week. She would like a call back at [phone] or [phone].

## 2017-03-28 NOTE — TELEPHONE ENCOUNTER
Spoke to pt, told pt she needed to contact her insurance company and have them contact MD office. Pt verbalized understanding.

## 2017-04-18 ENCOUNTER — OFFICE VISIT (OUTPATIENT)
Dept: INTERNAL MEDICINE CLINIC | Age: 57
End: 2017-04-18

## 2017-04-18 ENCOUNTER — TELEPHONE (OUTPATIENT)
Dept: INTERNAL MEDICINE CLINIC | Age: 57
End: 2017-04-18

## 2017-04-18 VITALS
DIASTOLIC BLOOD PRESSURE: 80 MMHG | WEIGHT: 274 LBS | OXYGEN SATURATION: 97 % | HEART RATE: 83 BPM | SYSTOLIC BLOOD PRESSURE: 135 MMHG | TEMPERATURE: 98.2 F | BODY MASS INDEX: 51.73 KG/M2 | RESPIRATION RATE: 18 BRPM | HEIGHT: 61 IN

## 2017-04-18 DIAGNOSIS — F33.1 MODERATE EPISODE OF RECURRENT MAJOR DEPRESSIVE DISORDER (HCC): Primary | ICD-10-CM

## 2017-04-18 DIAGNOSIS — F90.0 ATTENTION DEFICIT HYPERACTIVITY DISORDER (ADHD), PREDOMINANTLY INATTENTIVE TYPE: ICD-10-CM

## 2017-04-18 RX ORDER — DEXTROAMPHETAMINE SACCHARATE, AMPHETAMINE ASPARTATE, DEXTROAMPHETAMINE SULFATE AND AMPHETAMINE SULFATE 5; 5; 5; 5 MG/1; MG/1; MG/1; MG/1
40 TABLET ORAL 3 TIMES DAILY
Qty: 160 TAB | Refills: 0 | Status: SHIPPED | OUTPATIENT
Start: 2017-04-29 | End: 2017-04-25 | Stop reason: SDUPTHER

## 2017-04-18 NOTE — MR AVS SNAPSHOT
Visit Information Date & Time Provider Department Dept. Phone Encounter #  
 4/18/2017 11:00 AM Padma Jones MD [facility] [address] [id number] Upcoming Health Maintenance Date Due Hepatitis C Screening 1960 Pneumococcal 19-64 Medium Risk (1 of 1 - PPSV23) 1/16/1981 FOBT Q 1 YEAR AGE 50-75 1/16/2012 BREAST CANCER SCRN MAMMOGRAM 6/4/2016 INFLUENZA AGE 9 TO ADULT 8/1/2016 PAP AKA CERVICAL CYTOLOGY 2/2/2020 DTaP/Tdap/Td series (2 - Td) 9/10/2025 Allergies as of 4/18/2017  Review Complete On: 4/18/2017 By: Padma Jones MD  
  
 Severity Noted Reaction Type Reactions Egg  06/04/2014    Other (comments) Abdominal pain Current Immunizations  Reviewed on 9/10/2015 Name Date Tdap 9/10/2015 Not reviewed this visit You Were Diagnosed With   
  
 Codes Comments Moderate episode of recurrent major depressive disorder ([facility]    -  Primary ICD-10-CM: F33.1 ICD-9-CM: 296.32 Attention deficit hyperactivity disorder (ADHD), predominantly inattentive type     ICD-10-CM: F90.0 ICD-9-CM: 314.00 Vitals BP Pulse Temp Resp Height(growth percentile) Weight(growth percentile) 135/80 83 98.2 °F (36.8 °C) (Oral) 18 5' 1\" (1.549 m) 274 lb (124.3 kg) LMP SpO2 BMI OB Status Smoking Status 07/13/2007 97% 51.77 kg/m2 Postmenopausal Current Every Day Smoker Vitals History BMI and BSA Data Body Mass Index Body Surface Area 51.77 kg/m 2 2.31 m 2 Preferred Pharmacy Pharmacy Name Phone [id number] [place] [address] [facility] [phone] Your Updated Medication List  
  
   
This list is accurate as of: 4/18/17 12:22 PM.  Always use your most recent med list.  
  
  
  
  
 Cholecalciferol (Vitamin D3) 50,000 unit Cap Take 1 Cap by mouth every seven (7) days. citalopram 20 mg tablet Commonly known as:  CELEXA  
  
 dextroamphetamine-amphetamine 20 mg tablet Commonly known as:  ADDERALL Take 2 Tabs (40 mg total) by mouth three (3) times dailyEarliest Fill Date: 4/29/17. Start taking on:  4/29/2017  
  
 dilTIAZem  mg ER capsule Commonly known as:  CARDIZEM CD Take 1 Cap by mouth daily. EFFEXOR  mg capsule Generic drug:  venlafaxine-SR Take  by mouth daily. LORazepam 1 mg tablet Commonly known as:  ATIVAN Take 1 Tab by mouth every eight (8) hours as needed. SEROquel 25 mg tablet Generic drug:  QUEtiapine Take  by mouth. 1-2 at bedtime as needed for sleep  
  
 simvastatin 10 mg tablet Commonly known as:  ZOCOR Take 1 Tab by mouth nightly. SYNTHROID 112 mcg tablet Generic drug:  levothyroxine Take  by mouth Daily (before breakfast). Prescriptions Printed Refills  
 dextroamphetamine-amphetamine (ADDERALL) 20 mg tablet 0 Starting on: 4/29/2017 Sig: Take 2 Tabs (40 mg total) by mouth three (3) times dailyEarliest Fill Date: 4/29/17. Class: Print Route: Oral  
  
Introducing [facility] & HEALTH SERVICES! Dear Joshua Davis: Thank you for requesting a [employer] account. Our records indicate that you already have an active [employer] account. You can access your account anytime at [email or web address] Did you know that you can access your hospital and ER discharge instructions at any time in Enh[employer]? You can also review all of your test results from your hospital stay or ER visit. Additional Information If you have questions, please visit the Frequently Asked Questions section of the [employer] website at [email or web address] Remember, [employer] is NOT to be used for urgent needs. For medical emergencies, dial 911. Now available from your iPhone and Android! Please provide this summary of care documentation to your next provider. Your primary care clinician is listed as Shakir Banegas. If you have any questions after today's visit, please call [phone].

## 2017-04-18 NOTE — PROGRESS NOTES
Sidney Barthel Yoly Gallagher is a 56 y.o. female and presents with Medication Evaluation (adderall)  . Pt presents to follow up on psych meds and htn. Add  Pt presents for followup. She saw dr. Ovidio Arndt who she liked but felt she was seen as doctor shopping because her  shows docs from our practice who wrote for adderall medication. Dr. Akhil Pierre has retired. She saw Dr. Loki Lewis but he wanted to change her meds at the first meeting. No si/no hi and has been stable for the past year. She is currently taking medications as dr. Martir Becker rx'd. Subjective:  wound  improved      Htn  Subjective:   Yoly Gallagher is a 56 y.o. female with hypertension. Hypertension ROS: taking medications as instructed, no medication side effects noted, no TIA's, no chest pain on exertion, no dyspnea on exertion, no swelling of ankles. New concerns: none    Cholesterol  She is now taking medication zocor  No muscle aches          Review of Systems  Constitutional: negative for fevers, chills, anorexia and weight loss  Eyes:   negative for visual disturbance and irritation  ENT:   negative for tinnitus,sore throat,nasal congestion,ear pains. hoarseness  Respiratory:  negative for cough, hemoptysis, dyspnea,wheezing  CV:   negative for chest pain, palpitations, lower extremity edema  GI:   negative for nausea, vomiting, diarrhea, abdominal pain,melena  Endo:               negative for polyuria,polydipsia,polyphagia,heat intolerance  Genitourinary: negative for frequency, dysuria and hematuria  Integument:  negative for rash and pruritus  Hematologic:  negative for easy bruising and gum/nose bleeding  Musculoskel: negative for myalgias, arthralgias, back pain, muscle weakness, joint pain  Neurological:  negative for headaches, dizziness, vertigo, memory problems and gait   Behavl/Psych: negative for feelings of anxiety, depression, mood changes    Past Medical History:   Diagnosis Date    Anxiety     Depression 8/19/2010    HTN (hypertension)     Hyperlipidemia LDL goal < 130     Hypothyroid     Psychotic disorder     Severe depression and anxiety diagnosed at 35 yo    Tobacco abuse      Past Surgical History:   Procedure Laterality Date    HX APPENDECTOMY      HX DILATION AND CURETTAGE       Social History     Social History    Marital status:      Spouse name: N/A    Number of children: N/A    Years of education: N/A     Social History Main Topics    Smoking status: Current Every Day Smoker     Packs/day: 1.00    Smokeless tobacco: Never Used      Comment:  1 ppd/40+ years    Alcohol use No    Drug use: No      Comment: 1 pack a day    Sexual activity: No     Other Topics Concern    None     Social History Narrative            2 children        Not employed/filing for disability    Stopped working after children were born 22 yo and 11 yo     No family history on file. Current Outpatient Prescriptions   Medication Sig Dispense Refill    dextroamphetamine-amphetamine (ADDERALL) 20 mg tablet Take 2 Tabs (40 mg total) by mouth three (3) times dailyEarliest Fill Date: 3/8/17. 160 Tab 0    citalopram (CELEXA) 20 mg tablet       simvastatin (ZOCOR) 10 mg tablet Take 1 Tab by mouth nightly. 90 Tab 1    levothyroxine (SYNTHROID) 112 mcg tablet Take  by mouth Daily (before breakfast).  dilTIAZem CD (CARDIZEM CD) 180 mg ER capsule Take 1 Cap by mouth daily. 90 Cap 3    Cholecalciferol, Vitamin D3, 50,000 unit cap Take 1 Cap by mouth every seven (7) days. 12 Cap 0    venlafaxine-SR (EFFEXOR XR) 150 mg capsule Take  by mouth daily.  LORazepam (ATIVAN) 1 mg tablet Take 1 Tab by mouth every eight (8) hours as needed. 60 Tab 0    quetiapine (SEROQUEL) 25 mg tablet Take  by mouth.  1-2 at bedtime as needed for sleep        Allergies   Allergen Reactions    Egg Other (comments)     Abdominal pain       Objective:  Visit Vitals    /80    Pulse 83    Temp 98.2 °F (36.8 °C) (Oral)    Resp 18    Ht 5' 1\" (1.549 m)    Wt 274 lb (124.3 kg)    LMP 07/13/2007    SpO2 97%    BMI 51.77 kg/m2        Physical Exam:   General appearance - alert, well appearing, and in no distress and obese,tangential though exam  Mental status - alert, oriented to person, place, and time  EYE-ALMA, EOMI, fundi normal, corneas normal, no foreign bodies, visual acuity normal both eyes  Nose - normal and patent, no erythema, discharge or polyps  Mouth - mucous membranes moist, pharynx normal without lesions  Neck - supple, no significant adenopathy   Chest - clear to auscultation, no wheezes, rales or rhonchi, symmetric air entry   Heart - normal rate, regular rhythm, normal S1, S2, no murmurs, rubs, clicks or gallops   Abdomen - soft, nontender, nondistended, no masses or organomegaly  Ext-peripheral pulses normal, no pedal edema, no clubbing or cyanosis,left shin no edema, right lower leg flat macule, + white quarter sized macule no erythema,  bilateral hyperpigmentation lower legs  Skin-Warm and dry. no hyperpigmentation, vitiligo, or suspicious lesions, tan skin, Neuro -alert, oriented, normal speech, no focal findings or movement disorder noted  Neck-normal C-spine, no tenderness, full ROM without pain  Gyn: vaginal atrophy noted, appeared to be blood at os    Trini Hidalgo was seen today for medication evaluation. Trini Hidalgo was seen today for medication evaluation. Diagnoses and all orders for this visit:    Moderate episode of recurrent major depressive disorder (HCC)        Attention deficit hyperactivity disorder (ADHD), predominantly inattentive type  -     dextroamphetamine-amphetamine (ADDERALL) 20 mg tablet; Take 2 Tabs (40 mg total) by mouth three (3) times dailyEarliest Fill Date: 4/29/17. I spent 25 min with pt and >50% of the time was spent on management and counseling of pt re: prescription management of adderall medication. She will initiate [place] [facility].   I have tried to get on  with my login but can not enter website. Pt will return to clinic in 3 weeks. I will look at the other prescribers. Based on cc from our practice. She has been stable with dr. Cristobal Foote. Will monitor    This note will not be viewable in [address]

## 2017-04-20 ENCOUNTER — TELEPHONE (OUTPATIENT)
Dept: INTERNAL MEDICINE CLINIC | Age: 57
End: 2017-04-20

## 2017-04-20 NOTE — TELEPHONE ENCOUNTER
Pt requesting a return call from Dr. Nicolette Araiza. States she wants to discuss a med she was prescribed yesterday.  Please call [phone]

## 2017-04-20 NOTE — TELEPHONE ENCOUNTER
Spoke to pt, pt stated she will run out of her rx adderall from March soon so she will like to  the new rx sooner than 4/29. Rx written for 26 days not 30 days. Explained pt can take to her local pharmacy to have them contact us so we can ok her rx to be dispense sooner. Pt verbalized understanding.

## 2017-04-24 ENCOUNTER — TELEPHONE (OUTPATIENT)
Dept: INTERNAL MEDICINE CLINIC | Age: 57
End: 2017-04-24

## 2017-04-24 NOTE — TELEPHONE ENCOUNTER
----- Message from Moises Barbour sent at 4/24/2017  4:20 PM EDT -----  Regarding: Dr. Tamar West  Pt, (P) [phone], needs to speak to you again about the medication that was discussed last week. Pt went to pharmacy and they said something different. If she can get a call 04/24/17 pt would prefer it.

## 2017-04-25 ENCOUNTER — DOCUMENTATION ONLY (OUTPATIENT)
Dept: INTERNAL MEDICINE CLINIC | Age: 57
End: 2017-04-25

## 2017-04-25 DIAGNOSIS — F90.0 ATTENTION DEFICIT HYPERACTIVITY DISORDER (ADHD), PREDOMINANTLY INATTENTIVE TYPE: ICD-10-CM

## 2017-04-25 RX ORDER — DEXTROAMPHETAMINE SACCHARATE, AMPHETAMINE ASPARTATE, DEXTROAMPHETAMINE SULFATE AND AMPHETAMINE SULFATE 5; 5; 5; 5 MG/1; MG/1; MG/1; MG/1
40 TABLET ORAL 3 TIMES DAILY
Qty: 160 TAB | Refills: 0 | Status: SHIPPED | OUTPATIENT
Start: 2017-04-26 | End: 2017-05-23 | Stop reason: SDUPTHER

## 2017-04-25 NOTE — TELEPHONE ENCOUNTER
----- Message from [facility] sent at 4/25/2017  1:41 PM EDT -----  Regarding: Dr. Elizabeth Valdovinos/Refill  Pt would like to speak to [facility] the nurse; pt called last week and again on Thursday concerning her meds. The pharmacy stated, pt needs a new written Rx. Pt has been out for the past week. Pt best contact .

## 2017-04-25 NOTE — TELEPHONE ENCOUNTER
Patient wanted to exchange old rx (Evelyne Ramirez) for new rx with a new dispense date. Pt return old script and picked up new script.

## 2017-05-17 RX ORDER — VENLAFAXINE HYDROCHLORIDE 150 MG/1
CAPSULE, EXTENDED RELEASE ORAL
Qty: 30 CAP | Refills: 0 | Status: SHIPPED | OUTPATIENT
Start: 2017-05-17 | End: 2017-05-23 | Stop reason: SDUPTHER

## 2017-05-18 ENCOUNTER — TELEPHONE (OUTPATIENT)
Dept: INTERNAL MEDICINE CLINIC | Age: 57
End: 2017-05-18

## 2017-05-18 NOTE — TELEPHONE ENCOUNTER
In An attempt to reschedule patient appointment on 05/23 due to provider out of the office for mandatory training, patient insisted that she can not come in at any other time or her medication will not work. Patient wanted to be scheduled with another provider to get medication. Patient was advised that it is best the she stay with her provider for her medication. One of the choices that was offered 05/24 @ 8:30 patient stated that is to far away for her medication (Rx will not work)  original date for the appointment 05/23 @ 11:45    Patient would like to have a return call regarding an appointment for later that day 05/23 around 3:15. Patient was advised that the nurse will give her a call back to address getting her scheduled.

## 2017-05-19 NOTE — TELEPHONE ENCOUNTER
Return call to pt, she did not answer, left v/m for pt stating 3:15 pm appt available for pt 5/23/17.

## 2017-05-19 NOTE — TELEPHONE ENCOUNTER
----- Message from Brayden Philip sent at 5/19/2017  4:01 PM EDT -----  Regarding: /telephone  Pt returning a call to [facility]. Best contact number is [phone].

## 2017-05-22 NOTE — TELEPHONE ENCOUNTER
Return call to pt, she did not answer, left v/m for pt stating PCP can see her on 5/23/17 at 3:15pm.

## 2017-05-23 ENCOUNTER — OFFICE VISIT (OUTPATIENT)
Dept: INTERNAL MEDICINE CLINIC | Age: 57
End: 2017-05-23

## 2017-05-23 VITALS
BODY MASS INDEX: 50.79 KG/M2 | TEMPERATURE: 98.3 F | RESPIRATION RATE: 12 BRPM | DIASTOLIC BLOOD PRESSURE: 78 MMHG | HEIGHT: 61 IN | HEART RATE: 92 BPM | SYSTOLIC BLOOD PRESSURE: 132 MMHG | WEIGHT: 269 LBS

## 2017-05-23 DIAGNOSIS — F90.0 ATTENTION DEFICIT HYPERACTIVITY DISORDER (ADHD), PREDOMINANTLY INATTENTIVE TYPE: ICD-10-CM

## 2017-05-23 RX ORDER — DEXTROAMPHETAMINE SACCHARATE, AMPHETAMINE ASPARTATE, DEXTROAMPHETAMINE SULFATE AND AMPHETAMINE SULFATE 5; 5; 5; 5 MG/1; MG/1; MG/1; MG/1
TABLET ORAL
Qty: 150 TAB | Refills: 0 | Status: SHIPPED | OUTPATIENT
Start: 2017-05-23 | End: 2017-09-30

## 2017-05-23 RX ORDER — QUETIAPINE FUMARATE 25 MG/1
25 TABLET, FILM COATED ORAL
Qty: 30 TAB | Refills: 0 | Status: SHIPPED | OUTPATIENT
Start: 2017-05-23 | End: 2017-09-30

## 2017-05-23 RX ORDER — CHOLECALCIFEROL (VITAMIN D3) 125 MCG
2000 CAPSULE ORAL
COMMUNITY

## 2017-05-23 RX ORDER — VENLAFAXINE HYDROCHLORIDE 150 MG/1
CAPSULE, EXTENDED RELEASE ORAL
Qty: 30 CAP | Refills: 0 | Status: SHIPPED | OUTPATIENT
Start: 2017-05-23 | End: 2017-10-10

## 2017-05-23 NOTE — PROGRESS NOTES
Sidney Barthel Yoly Gallagher is a 56 y.o. female and presents with Depression  . Thyroid  Followed by Dr. Urszula Alejandro. She is getting her thyroid levels checked with endocrine. Vit D low  Taking 2000 iu daily because levels were low    Add/depression/anxiety  Pt reports she is out of adderall. She has been on adderall 20 mg tid for 3 years. She reports she was seeing psychiatrist/psychologist at [facility].  Pt was rxed 60 mg of vyvanse initally by Dr. Olinda Vazquez. Omari Carney NP at [email or web address] transitioned pt to adderall 80 mg /day. She was told to see Dr. Merritt Sees but was told she was not a good fit with him because he was trying to wean her adderal back down. She was not able to call [facility] because month of May was difficult. She was interested in calling psychiatrist Luigi Jaramillo at [facility]. She has her number and will make an appt then mychart me with appt. No si/hi. She did have hx of attempt in 2011. She has a close relationship with mother who is [address] Dr. Akhil Pierre has retired. Htn  Subjective:   Yoly Gallagher is a 56 y.o. female with hypertension. Hypertension ROS: taking medications as instructed, no medication side effects noted, no TIA's, no chest pain on exertion, no dyspnea on exertion, no swelling of ankles. New concerns: none    Cholesterol  She is now taking medication zocor  No muscle aches          Review of Systems  Constitutional: negative for fevers, chills, anorexia and weight loss  Eyes:   negative for visual disturbance and irritation  ENT:   negative for tinnitus,sore throat,nasal congestion,ear pains. hoarseness  Respiratory:  negative for cough, hemoptysis, dyspnea,wheezing  CV:   negative for chest pain, palpitations, lower extremity edema  GI:   negative for nausea, vomiting, diarrhea, abdominal pain,melena  Endo:               negative for polyuria,polydipsia,polyphagia,heat intolerance  Genitourinary: negative for frequency, dysuria and hematuria  Integument:  negative for rash and pruritus  Hematologic:  negative for easy bruising and gum/nose bleeding  Musculoskel: negative for myalgias, arthralgias, back pain, muscle weakness, joint pain  Neurological:  negative for headaches, dizziness, vertigo, memory problems and gait   Behavl/Psych: negative for feelings of anxiety, depression, mood changes    Past Medical History:   Diagnosis Date    Anxiety     Depression 8/19/2010    HTN (hypertension)     Hyperlipidemia LDL goal < 130     Hypothyroid     Psychotic disorder     Severe depression and anxiety diagnosed at 35 yo    Tobacco abuse      Past Surgical History:   Procedure Laterality Date    HX APPENDECTOMY      HX DILATION AND CURETTAGE       Social History     Social History    Marital status:      Spouse name: N/A    Number of children: N/A    Years of education: N/A     Social History Main Topics    Smoking status: Current Every Day Smoker     Packs/day: 1.00    Smokeless tobacco: Never Used      Comment:  1 ppd/40+ years    Alcohol use No    Drug use: No      Comment: 1 pack a day    Sexual activity: No     Other Topics Concern    None     Social History Narrative            2 children        Not employed/filing for disability    Stopped working after children were born 22 yo and 13 yo     History reviewed. No pertinent family history. Current Outpatient Prescriptions   Medication Sig Dispense Refill    cholecalciferol, vitamin D3, (VITAMIN D3) 2,000 unit tab Take  by mouth.  venlafaxine-SR (EFFEXOR-XR) 150 mg capsule TAKE ONE CAPSULE BY MOUTH EVERY DAY 30 Cap 0    dextroamphetamine-amphetamine (ADDERALL) 20 mg tablet Take 2 Tabs (40 mg total) by mouth three (3) times dailyEarliest Fill Date: 4/26/17. 160 Tab 0    citalopram (CELEXA) 20 mg tablet       simvastatin (ZOCOR) 10 mg tablet Take 1 Tab by mouth nightly.  90 Tab 1    levothyroxine (SYNTHROID) 112 mcg tablet Take  by mouth Daily (before breakfast).  dilTIAZem CD (CARDIZEM CD) 180 mg ER capsule Take 1 Cap by mouth daily. 90 Cap 3    LORazepam (ATIVAN) 1 mg tablet Take 1 Tab by mouth every eight (8) hours as needed. 60 Tab 0    quetiapine (SEROQUEL) 25 mg tablet Take  by mouth. 1-2 at bedtime as needed for sleep       Cholecalciferol, Vitamin D3, 50,000 unit cap Take 1 Cap by mouth every seven (7) days. 12 Cap 0     Allergies   Allergen Reactions    Egg Other (comments)     Abdominal pain       Objective:  Visit Vitals    /78 (BP 1 Location: Left arm, BP Patient Position: Sitting)    Pulse 92    Temp 98.3 °F (36.8 °C) (Oral)    Resp 12    Ht 5' 1\" (1.549 m)    Wt 269 lb (122 kg)    LMP 07/13/2007    BMI 50.83 kg/m2        Physical Exam:   General appearance - alert, well appearing, and in no distress and obese,tangential though exam  Mental status - alert, oriented to person, place, and time  EYE-ALMA, EOMI, fundi normal, corneas normal, no foreign bodies, visual acuity normal both eyes  Nose - normal and patent, no erythema, discharge or polyps  Mouth - mucous membranes moist, pharynx normal without lesions  Neck - supple, no significant adenopathy   Chest - clear to auscultation, no wheezes, rales or rhonchi, symmetric air entry   Heart - normal rate, regular rhythm, normal S1, S2, no murmurs, rubs, clicks or gallops   Abdomen - soft, nontender, nondistended, no masses or organomegaly  Ext-peripheral pulses normal, no pedal edema, no clubbing or cyanosis,left shin no edema, right lower leg flat macule, + white quarter sized macule no erythema,  bilateral hyperpigmentation lower legs  Skin-Warm and dry. no hyperpigmentation, vitiligo, or suspicious lesions, tan skin, Neuro -alert, oriented, normal speech, no focal findings or movement disorder noted  Neck-normal C-spine, no tenderness, full ROM without pain  Gyn: vaginal atrophy noted, appeared to be blood at os    [place]ove Livers was seen today for depression.     Diagnoses and all orders for this visit:    Attention deficit hyperactivity disorder (ADHD), predominantly inattentive type  -     dextroamphetamine-amphetamine (ADDERALL) 20 mg tablet; Indications: ATTENTION-DEFICIT HYPERACTIVITY DISORDER. Take 1-2 tablet po tid. Take only as needed for add    Other orders  -     venlafaxine-SR (EFFEXOR-XR) 150 mg capsule; TAKE ONE CAPSULE BY MOUTH EVERY DAY  -     QUEtiapine (SEROQUEL) 25 mg tablet; Take 1 Tab by mouth nightly. I spent 25 min with pt and >50% of the time was spent on management and counseling of pt re: prescription management of adderall medication. She DID NOT initiate [place] [facility]. I will ask Karena Cedillo to pull pt's . Pt will return to clinic in 4 weeks. I discussed with her that her dosing of adderall exceeds recommendations from FDA and that she really should be seen by psych by now. Will decrease her from 6 tabs /day to 5 tabs per day. At next visit she will hopefully have psychiatry appt and will wean to 4 tabs per day. Psychiatry expertise to evaluate for optimal dose for this pt. Other psych meds refilled. No si/hi  This note will not be viewable in [employer].

## 2017-05-23 NOTE — MR AVS SNAPSHOT
Visit Information Date & Time Provider Department Dept. Phone Encounter #  
 5/23/2017  3:15 PM Manuelito Jimenez MD [facility] [address] [id number] Upcoming Health Maintenance Date Due Hepatitis C Screening 1960 Pneumococcal 19-64 Medium Risk (1 of 1 - PPSV23) 1/16/1981 FOBT Q 1 YEAR AGE 50-75 1/16/2012 BREAST CANCER SCRN MAMMOGRAM 6/4/2016 INFLUENZA AGE 9 TO ADULT 8/1/2017 PAP AKA CERVICAL CYTOLOGY 2/2/2020 DTaP/Tdap/Td series (2 - Td) 9/10/2025 Allergies as of 5/23/2017  Review Complete On: 5/23/2017 By: Manuelito Jimenez MD  
  
 Severity Noted Reaction Type Reactions Egg  06/04/2014    Other (comments) Abdominal pain Current Immunizations  Reviewed on 9/10/2015 Name Date Tdap 9/10/2015 Not reviewed this visit You Were Diagnosed With   
  
 Codes Comments Attention deficit hyperactivity disorder (ADHD), predominantly inattentive type     ICD-10-CM: F90.0 ICD-9-CM: 314.00 Vitals BP Pulse Temp Resp Height(growth percentile) Weight(growth percentile) 132/78 (BP 1 Location: Left arm, BP Patient Position: Sitting) 92 98.3 °F (36.8 °C) (Oral) 12 5' 1\" (1.549 m) 269 lb (122 kg) LMP BMI OB Status Smoking Status 07/13/2007 50.83 kg/m2 Postmenopausal Current Every Day Smoker Vitals History BMI and BSA Data Body Mass Index Body Surface Area  
 50.83 kg/m 2 2.29 m 2 Preferred Pharmacy Pharmacy Name Phone [id number] [place], [address] [place] [address] [phone] Your Updated Medication List  
  
   
This list is accurate as of: 5/23/17  4:17 PM.  Always use your most recent med list.  
  
  
  
  
 * VITAMIN D3 2,000 unit Tab Generic drug:  cholecalciferol (vitamin D3) Take  by mouth. * Cholecalciferol (Vitamin D3) 50,000 unit Cap Take 1 Cap by mouth every seven (7) days. citalopram 20 mg tablet Commonly known as:  CELEXA  
  
 dextroamphetamine-amphetamine 20 mg tablet Commonly known as:  ADDERALL Indications: ATTENTION-DEFICIT HYPERACTIVITY DISORDER. Take 1-2 tablet po tid. Take only as needed for add  
  
 dilTIAZem  mg ER capsule Commonly known as:  CARDIZEM CD Take 1 Cap by mouth daily. LORazepam 1 mg tablet Commonly known as:  ATIVAN Take 1 Tab by mouth every eight (8) hours as needed. QUEtiapine 25 mg tablet Commonly known as:  SEROquel Take 1 Tab by mouth nightly. simvastatin 10 mg tablet Commonly known as:  ZOCOR Take 1 Tab by mouth nightly. SYNTHROID 112 mcg tablet Generic drug:  levothyroxine Take  by mouth Daily (before breakfast). venlafaxine- mg capsule Commonly known as:  EFFEXOR-XR  
TAKE ONE CAPSULE BY MOUTH EVERY DAY  
  
 * Notice: This list has 2 medication(s) that are the same as other medications prescribed for you. Read the directions carefully, and ask your doctor or other care provider to review them with you. Prescriptions Printed Refills  
 dextroamphetamine-amphetamine (ADDERALL) 20 mg tablet 0 Sig: Indications: ATTENTION-DEFICIT HYPERACTIVITY DISORDER. Take 1-2 tablet po tid. Take only as needed for add Class: Print Prescriptions Sent to Pharmacy Refills  
 venlafaxine-SR (EFFEXOR-XR) 150 mg capsule 0 Sig: TAKE ONE CAPSULE BY MOUTH EVERY DAY Class: Normal  
 Pharmacy: [facility]/pharmacy #[id number] Ph #: [phone] QUEtiapine (SEROQUEL) 25 mg tablet 0 Sig: Take 1 Tab by mouth nightly. Class: Normal  
 Pharmacy: [facility]/pharmacy #[id number] Ph #: [phone] Route: Oral  
  
Introducing [facility]! Dear Gilbert Aquino: Thank you for requesting a [employer] account.   Our records indicate that you already have an active Siftit account. You can access your account anytime at [email or web address] Did you know that you can access your hospital and ER discharge instructions at any time in Siftit? You can also review all of your test results from your hospital stay or ER visit. Additional Information If you have questions, please visit the Frequently Asked Questions section of the Siftit website at [email or web address] Remember, Siftit is NOT to be used for urgent needs. For medical emergencies, dial 911. Now available from your iPhone and Android! Please provide this summary of care documentation to your next provider. Your primary care clinician is listed as Miracle Page. If you have any questions after today's visit, please call [phone].

## 2017-06-08 ENCOUNTER — OFFICE VISIT (OUTPATIENT)
Dept: INTERNAL MEDICINE CLINIC | Age: 57
End: 2017-06-08

## 2017-06-08 VITALS
OXYGEN SATURATION: 95 % | SYSTOLIC BLOOD PRESSURE: 114 MMHG | DIASTOLIC BLOOD PRESSURE: 68 MMHG | HEIGHT: 61 IN | HEART RATE: 85 BPM | RESPIRATION RATE: 18 BRPM | BODY MASS INDEX: 52.3 KG/M2 | TEMPERATURE: 99 F | WEIGHT: 277 LBS

## 2017-06-08 DIAGNOSIS — N32.89 BLADDER IRRITABILITY: Primary | ICD-10-CM

## 2017-06-08 DIAGNOSIS — R89.9 ABNORMAL LABORATORY TEST: ICD-10-CM

## 2017-06-08 DIAGNOSIS — E78.00 HYPERCHOLESTEREMIA: ICD-10-CM

## 2017-06-08 DIAGNOSIS — F41.9 ANXIETY: ICD-10-CM

## 2017-06-08 LAB
BILIRUB UR QL STRIP: NEGATIVE
GLUCOSE UR-MCNC: NEGATIVE MG/DL
KETONES P FAST UR STRIP-MCNC: NEGATIVE MG/DL
PH UR STRIP: 6.5 [PH] (ref 4.6–8)
PROT UR QL STRIP: NORMAL MG/DL
SP GR UR STRIP: 1.01 (ref 1–1.03)
UA UROBILINOGEN AMB POC: NORMAL (ref 0.2–1)
URINALYSIS CLARITY POC: NORMAL
URINALYSIS COLOR POC: YELLOW
URINE BLOOD POC: NORMAL
URINE LEUKOCYTES POC: NORMAL
URINE NITRITES POC: NEGATIVE

## 2017-06-08 RX ORDER — CIPROFLOXACIN 500 MG/1
500 TABLET ORAL 2 TIMES DAILY
Qty: 14 TAB | Refills: 0 | Status: SHIPPED | OUTPATIENT
Start: 2017-06-08 | End: 2017-06-14 | Stop reason: ALTCHOICE

## 2017-06-08 NOTE — PATIENT INSTRUCTIONS
Urinary Tract Infection in Women: Care Instructions  Your Care Instructions    A urinary tract infection, or UTI, is a general term for an infection anywhere between the kidneys and the urethra (where urine comes out). Most UTIs are bladder infections. They often cause pain or burning when you urinate. UTIs are caused by bacteria and can be cured with antibiotics. Be sure to complete your treatment so that the infection goes away. Follow-up care is a key part of your treatment and safety. Be sure to make and go to all appointments, and call your doctor if you are having problems. It's also a good idea to know your test results and keep a list of the medicines you take. How can you care for yourself at home? · Take your antibiotics as directed. Do not stop taking them just because you feel better. You need to take the full course of antibiotics. · Drink extra water and other fluids for the next day or two. This may help wash out the bacteria that are causing the infection. (If you have kidney, heart, or liver disease and have to limit fluids, talk with your doctor before you increase your fluid intake.)  · Avoid drinks that are carbonated or have caffeine. They can irritate the bladder. · Urinate often. Try to empty your bladder each time. · To relieve pain, take a hot bath or lay a heating pad set on low over your lower belly or genital area. Never go to sleep with a heating pad in place. To prevent UTIs  · Drink plenty of water each day. This helps you urinate often, which clears bacteria from your system. (If you have kidney, heart, or liver disease and have to limit fluids, talk with your doctor before you increase your fluid intake.)  · Urinate when you need to. · Urinate right after you have sex. · Change sanitary pads often. · Avoid douches, bubble baths, feminine hygiene sprays, and other feminine hygiene products that have deodorants.   · After going to the bathroom, wipe from front to back.  When should you call for help? Call your doctor now or seek immediate medical care if:  · Symptoms such as fever, chills, nausea, or vomiting get worse or appear for the first time. · You have new pain in your back just below your rib cage. This is called flank pain. · There is new blood or pus in your urine. · You have any problems with your antibiotic medicine. Watch closely for changes in your health, and be sure to contact your doctor if:  · You are not getting better after taking an antibiotic for 2 days. · Your symptoms go away but then come back. Where can you learn more? Go to http://lavelle-rhys.info/. Enter J041 in the search box to learn more about \"Urinary Tract Infection in Women: Care Instructions. \"  Current as of: November 28, 2016  Content Version: 11.2  © 1396-5138 [employer]. Care instructions adapted under license by [employer] (which disclaims liability or warranty for this information). If you have questions about a medical condition or this instruction, always ask your healthcare professional. [address] any warranty or liability for your use of this information.

## 2017-06-08 NOTE — PROGRESS NOTES
MichelleGarrett Ibanez is a 56 y.o. female and presents with Lethargy (weakness); Fever (99.5; x 4 days); and Abdominal Pain (bladder pain)  . uti  Pt reports lower abdominal pain and low grade fever for past 3 days. She reports gradually came on. She is trying to drink lots of water. She reports increased sweats with low grade activity. She feels achy over the weekend and had lost of malaise. Joints especially knees very sore. She feels pain is like a release which is electric. She felt very bloated in her stomach area. She voids every 3-4 hours. She denies pain on urination. She has some tenderness when presses on her bladder. Add/depression/anxiety  Pt will see Dr. Griselda Livings, psychiatrist June 14, 2017. Htn  Subjective:   Libby Ibanez is a 56 y.o. female with hypertension. Hypertension ROS: taking medications as instructed, no medication side effects noted, no TIA's, no chest pain on exertion, no dyspnea on exertion, no swelling of ankles. New concerns: none    Pt has concerns about labs    Review of Systems  Constitutional: negative for fevers, chills, anorexia and weight loss  Eyes:   negative for visual disturbance and irritation  ENT:   negative for tinnitus,sore throat,nasal congestion,ear pains. hoarseness  Respiratory:  negative for cough, hemoptysis, dyspnea,wheezing  CV:   negative for chest pain, palpitations, lower extremity edema  GI:   negative for nausea, vomiting, diarrhea, abdominal pain,melena  Endo:               negative for polyuria,polydipsia,polyphagia,heat intolerance  Genitourinary: negative for frequency, dysuria and hematuria  Integument:  negative for rash and pruritus  Hematologic:  negative for easy bruising and gum/nose bleeding  Musculoskel: negative for myalgias, arthralgias, back pain, muscle weakness, joint pain  Neurological:  negative for headaches, dizziness, vertigo, memory problems and gait   Behavl/Psych: negative for feelings of anxiety, depression, mood changes  Abdomen:  Mild suprapubic pain on palpation    Past Medical History:   Diagnosis Date    Anxiety     Depression 8/19/2010    HTN (hypertension)     Hyperlipidemia LDL goal < 130     Hypothyroid     Psychotic disorder     Severe depression and anxiety diagnosed at 33 yo    Tobacco abuse      Past Surgical History:   Procedure Laterality Date    HX APPENDECTOMY      HX DILATION AND CURETTAGE       Social History     Social History    Marital status:      Spouse name: N/A    Number of children: N/A    Years of education: N/A     Social History Main Topics    Smoking status: Current Every Day Smoker     Packs/day: 1.00    Smokeless tobacco: Never Used      Comment:  1 ppd/40+ years    Alcohol use No    Drug use: No      Comment: 1 pack a day    Sexual activity: No     Other Topics Concern    Not on file     Social History Narrative            2 children        Not employed/filing for disability    Stopped working after children were born 22 yo and 11 yo     No family history on file. Current Outpatient Prescriptions   Medication Sig Dispense Refill    simvastatin (ZOCOR) 10 mg tablet TAKE 1 TABLET BY MOUTH NIGHTLY 90 Tab 0    cholecalciferol, vitamin D3, (VITAMIN D3) 2,000 unit tab Take  by mouth.  dextroamphetamine-amphetamine (ADDERALL) 20 mg tablet Indications: ATTENTION-DEFICIT HYPERACTIVITY DISORDER. Take 1-2 tablet po tid. Take only as needed for add 150 Tab 0    venlafaxine-SR (EFFEXOR-XR) 150 mg capsule TAKE ONE CAPSULE BY MOUTH EVERY DAY 30 Cap 0    QUEtiapine (SEROQUEL) 25 mg tablet Take 1 Tab by mouth nightly. 30 Tab 0    citalopram (CELEXA) 20 mg tablet       levothyroxine (SYNTHROID) 112 mcg tablet Take  by mouth Daily (before breakfast).  dilTIAZem CD (CARDIZEM CD) 180 mg ER capsule Take 1 Cap by mouth daily. 90 Cap 3    LORazepam (ATIVAN) 1 mg tablet Take 1 Tab by mouth every eight (8) hours as needed.  60 Tab 0    Cholecalciferol, Vitamin D3, 50,000 unit cap Take 1 Cap by mouth every seven (7) days. 12 Cap 0     Allergies   Allergen Reactions    Egg Other (comments)     Abdominal pain       Objective:  Visit Vitals    /68    Pulse 85    Temp 99 °F (37.2 °C) (Oral)    Resp 18    Ht 5' 1\" (1.549 m)    Wt 277 lb (125.6 kg)    LMP 07/13/2007    SpO2 95%    BMI 52.34 kg/m2        Physical Exam:   General appearance - alert, well appearing, and in no distress and obese,  Mental status - alert, oriented to person, place, and time  EYE-ALMA, EOMI, fundi normal, corneas normal, no foreign bodies, visual acuity normal both eyes  Nose - normal and patent, no erythema, discharge or polyps  Mouth - mucous membranes moist, pharynx normal without lesions  Neck - supple, no significant adenopathy   Chest - clear to auscultation, no wheezes, rales or rhonchi, symmetric air entry   Heart - normal rate, regular rhythm, normal S1, S2, no murmurs, rubs, clicks or gallops   Abdomen - soft, nontender, nondistended, no masses or organomegaly, slight tenderness to palpation suprapubic area,   Ext-peripheral pulses normal, no pedal edema, no clubbing or cyanosis,left shin no edema, right lower leg flat macule, + white quarter sized macule no erythema,  bilateral hyperpigmentation lower legs  Skin-Warm and dry. no hyperpigmentation, vitiligo, or suspicious lesions, tan skin, Neuro -alert, oriented, normal speech, no focal findings or movement disorder noted  Neck-normal C-spine, no tenderness, full ROM without pain    Slava Kunz was seen today for lethargy, fever and abdominal pain. Diagnoses and all orders for this visit:    Bladder irritability  Will treat for uti  -     AMB POC URINALYSIS DIP STICK AUTO W/O MICRO  -     ciprofloxacin HCl (CIPRO) 500 mg tablet; Take 1 Tab by mouth two (2) times a day.  Do not take with celexa  Will monitor, ua c/w uti but may have underlying diverticulitis and may need flagyl    Anxiety  Appears stable  Discussed with pt they need to discuss her adderall dosing  Needs to follow up with psychiatrist for rx refills  She will see psyc 6/14/17      Hypercholesteremia  -     METABOLIC PANEL, COMPREHENSIVE; Future  -     LIPID PANEL; Future    Labs reviewed with pt. Renal fxn wnl and alp slightly elevated which we will monitor    This note will not be viewable in MyChart.

## 2017-06-12 DIAGNOSIS — F32.A DEPRESSION: ICD-10-CM

## 2017-06-14 RX ORDER — CITALOPRAM 20 MG/1
TABLET, FILM COATED ORAL
Qty: 30 TAB | Refills: 1 | Status: SHIPPED | OUTPATIENT
Start: 2017-06-14 | End: 2017-10-10

## 2017-06-14 RX ORDER — LORAZEPAM 1 MG/1
TABLET ORAL
Qty: 90 TAB | Refills: 2 | OUTPATIENT
Start: 2017-06-14

## 2017-09-27 ENCOUNTER — TELEPHONE (OUTPATIENT)
Dept: INTERNAL MEDICINE CLINIC | Age: 57
End: 2017-09-27

## 2017-09-27 DIAGNOSIS — N30.01 ACUTE CYSTITIS WITH HEMATURIA: Primary | ICD-10-CM

## 2017-09-27 RX ORDER — CIPROFLOXACIN 500 MG/1
500 TABLET ORAL 2 TIMES DAILY
Qty: 14 TAB | Refills: 0 | Status: SHIPPED | OUTPATIENT
Start: 2017-09-27 | End: 2017-09-28 | Stop reason: ALTCHOICE

## 2017-09-27 NOTE — TELEPHONE ENCOUNTER
Mother called in advised pt having severe lower abdominal pain and low back pain and fever 101.9 for couple of days now. Said had UTI early September.  Spoke with Dr Alycia Mas and advised pt that [facility] and scheduled an appointment for tomorrow 9/28/17 at 10:20 with Dr Alycia Mas

## 2017-09-28 ENCOUNTER — OFFICE VISIT (OUTPATIENT)
Dept: INTERNAL MEDICINE CLINIC | Age: 57
End: 2017-09-28

## 2017-09-28 VITALS
TEMPERATURE: 99 F | BODY MASS INDEX: 51.35 KG/M2 | OXYGEN SATURATION: 93 % | SYSTOLIC BLOOD PRESSURE: 105 MMHG | WEIGHT: 272 LBS | DIASTOLIC BLOOD PRESSURE: 71 MMHG | HEIGHT: 61 IN | HEART RATE: 83 BPM | RESPIRATION RATE: 14 BRPM

## 2017-09-28 DIAGNOSIS — M54.5 LOW BACK PAIN, UNSPECIFIED BACK PAIN LATERALITY, UNSPECIFIED CHRONICITY, WITH SCIATICA PRESENCE UNSPECIFIED: Primary | ICD-10-CM

## 2017-09-28 DIAGNOSIS — N39.490 OVERFLOW INCONTINENCE OF URINE: ICD-10-CM

## 2017-09-28 LAB
BILIRUB UR QL STRIP: ABNORMAL
GLUCOSE UR-MCNC: ABNORMAL MG/DL
KETONES P FAST UR STRIP-MCNC: ABNORMAL MG/DL
PH UR STRIP: 5.5 [PH] (ref 4.6–8)
PROT UR QL STRIP: ABNORMAL MG/DL
SP GR UR STRIP: 1.03 (ref 1–1.03)
UA UROBILINOGEN AMB POC: ABNORMAL (ref 0.2–1)
URINALYSIS CLARITY POC: ABNORMAL
URINALYSIS COLOR POC: ABNORMAL
URINE BLOOD POC: ABNORMAL
URINE LEUKOCYTES POC: ABNORMAL
URINE NITRITES POC: POSITIVE

## 2017-09-28 RX ORDER — PHENAZOPYRIDINE HYDROCHLORIDE 100 MG/1
100 TABLET, FILM COATED ORAL
Qty: 9 TAB | Refills: 0 | Status: ON HOLD | OUTPATIENT
Start: 2017-09-28 | End: 2017-10-06

## 2017-09-28 RX ORDER — DEXTROAMPHETAMINE SACCHARATE, AMPHETAMINE ASPARTATE, DEXTROAMPHETAMINE SULFATE AND AMPHETAMINE SULFATE 3.75; 3.75; 3.75; 3.75 MG/1; MG/1; MG/1; MG/1
TABLET ORAL
Refills: 0 | COMMUNITY
Start: 2017-08-31 | End: 2017-09-30

## 2017-09-28 RX ORDER — NITROFURANTOIN 25; 75 MG/1; MG/1
100 CAPSULE ORAL 2 TIMES DAILY
Qty: 20 CAP | Refills: 0 | Status: SHIPPED | OUTPATIENT
Start: 2017-09-28 | End: 2017-10-10

## 2017-09-28 NOTE — MR AVS SNAPSHOT
Visit Information Date & Time Provider Department Dept. Phone Encounter #  
 9/28/2017 10:20 AM Ryann Waller MD [facility] [address] [id number] Upcoming Health Maintenance Date Due Hepatitis C Screening 1960 Pneumococcal 19-64 Medium Risk (1 of 1 - PPSV23) 1/16/1981 FOBT Q 1 YEAR AGE 50-75 1/16/2012 BREAST CANCER SCRN MAMMOGRAM 6/4/2016 INFLUENZA AGE 9 TO ADULT 8/1/2017 PAP AKA CERVICAL CYTOLOGY 2/2/2020 DTaP/Tdap/Td series (2 - Td) 9/10/2025 Allergies as of 9/28/2017  Review Complete On: 9/28/2017 By: Ryann Waller MD  
  
 Severity Noted Reaction Type Reactions Egg  06/04/2014    Other (comments) Abdominal pain Current Immunizations  Reviewed on 9/10/2015 Name Date Tdap 9/10/2015 Not reviewed this visit You Were Diagnosed With   
  
 Codes Comments Low back pain, unspecified back pain laterality, unspecified chronicity, with sciatica presence unspecified    -  Primary ICD-10-CM: M54.5 ICD-9-CM: 724.2 Overflow incontinence of urine     ICD-10-CM: N39.490 ICD-9-CM: 788.38 Vitals BP Pulse Temp Resp Height(growth percentile) Weight(growth percentile) 105/71 (BP 1 Location: Left arm, BP Patient Position: Sitting) 83 99 °F (37.2 °C) (Oral) 14 5' 1\" (1.549 m) 272 lb (123.4 kg) LMP SpO2 BMI OB Status Smoking Status 07/13/2007 93% 51.39 kg/m2 Postmenopausal Current Every Day Smoker BMI and BSA Data Body Mass Index Body Surface Area  
 51.39 kg/m 2 2.3 m 2 [facility] [address] [phone] Your Updated Medication List  
  
   
This list is accurate as of: 9/28/17 11:25 AM.  Always use your most recent med list.  
  
  
  
  
 * VITAMIN D3 2,000 unit Tab Generic drug:  cholecalciferol (vitamin D3) Take  by mouth. * Cholecalciferol (Vitamin D3) 50,000 unit Cap Take 1 Cap by mouth every seven (7) days. citalopram 20 mg tablet Commonly known as:  CELEXA  
TAKE 1 TABLET BY MOUTH EVERY MORNING  
  
 * dextroamphetamine-amphetamine 20 mg tablet Commonly known as:  ADDERALL Indications: ATTENTION-DEFICIT HYPERACTIVITY DISORDER. Take 1-2 tablet po tid. Take only as needed for add * dextroamphetamine-amphetamine 15 mg tablet Commonly known as:  ADDERALL TK 1 T PO TID  
  
 dilTIAZem  mg ER capsule Commonly known as:  CARDIZEM CD Take 1 Cap by mouth daily. LORazepam 1 mg tablet Commonly known as:  ATIVAN Take 1 Tab by mouth every eight (8) hours as needed. nitrofurantoin (macrocrystal-monohydrate) 100 mg capsule Commonly known as:  MACROBID Take 1 Cap by mouth two (2) times a day. phenazopyridine 100 mg tablet Commonly known as:  PYRIDIUM Take 1 Tab by mouth three (3) times daily as needed for Pain for up to 3 days. Caution can cause orange staining on clothes from sweating, tears, urine QUEtiapine 25 mg tablet Commonly known as:  SEROquel Take 1 Tab by mouth nightly. simvastatin 10 mg tablet Commonly known as:  ZOCOR  
TAKE 1 TABLET BY MOUTH NIGHTLY  
  
 SYNTHROID 112 mcg tablet Generic drug:  levothyroxine Take  by mouth Daily (before breakfast). venlafaxine- mg capsule Commonly known as:  EFFEXOR-XR  
TAKE ONE CAPSULE BY MOUTH EVERY DAY  
  
 * Notice: This list has 4 medication(s) that are the same as other medications prescribed for you. Read the directions carefully, and ask your doctor or other care provider to review them with you. Prescriptions Printed Refills  
 phenazopyridine (PYRIDIUM) 100 mg tablet 0 Sig: Take 1 Tab by mouth three (3) times daily as needed for Pain for up to 3 days. Caution can cause orange staining on clothes from sweating, tears, urine Class: Print  Route: Oral  
  
 Prescriptions Sent to Pharmacy Refills  
 nitrofurantoin, macrocrystal-monohydrate, (MACROBID) 100 mg capsule 0 Sig: Take 1 Cap by mouth two (2) times a day. Class: Normal  
 Pharmacy: [address] #: [phone] Route: Oral  
  
We Performed the Following AMB POC URINALYSIS DIP STICK AUTO W/O MICRO [66469 CPT(R)] CULTURE, URINE [id number] CPT(R)] URINALYSIS W/ RFLX MICROSCOPIC [25601 CPT(R)] Patient Instructions Nitrofurantoin Combination (By mouth) Nitrofurantoin Monohydrate (eoo-hxee-tvuz-AN-toyn cga-xo-UZT-drate), Nitrofurantoin, Macrocrystals (kld-owlg-cshy-AN-toyn UVW-qgy-oowh-tals) Treats urinary tract infections caused by bacteria. This medicine is an antibiotic. Brand Name(s): Macrobid There may be other brand names for this medicine. When This Medicine Should Not Be Used: You should not use this medicine if you have had an allergic reaction to nitrofurantoin or if you are in your last weeks of pregnancy (week 38 or later). You should not use this medicine if you have severe kidney disease, if you are unable to urinate, or if you have a decreased amount of urine. Do not use this medicine if you have a history of liver disease with nitrofurantoin. How to Use This Medicine:  
Capsule · Your doctor will tell you how much medicine to use. Do not use more than directed. · It is best to take this medicine with food or milk. · Take all of the medicine in your prescription to clear up your infection, even if you feel better after the first few doses. If a dose is missed: · Take a dose as soon as you remember. If it is almost time for your next dose, wait until then and take a regular dose. Do not take extra medicine to make up for a missed dose. How to Store and Dispose of This Medicine: · Store the medicine in a closed container at room temperature, away from heat, moisture, and direct light. · Ask your pharmacist, doctor, or health caregiver about the best way to dispose of any outdated medicine or medicine no longer needed. · Keep all medicine out of the reach of children. Never share your medicine with anyone. Drugs and Foods to Avoid: Ask your doctor or pharmacist before using any other medicine, including over-the-counter medicines, vitamins, and herbal products. · Make sure your doctor knows if you are also using probenecid (Benemid®) or sulfinpyrazone (Anturane®). · It is best not to use antacids containing magnesium trisilicate (such as Foamicon® or Gaviscon®) while you are using nitrofurantoin. Warnings While Using This Medicine: · Make sure your doctor knows if you are pregnant or breastfeeding, or if you have liver disease, heart disease, lung disease, anemia, diabetes, a mineral imbalance in the blood, or vitamin B deficiency. Make sure your doctor knows if you have a condition called [id number]. · This medicine may cause your urine to be a brown color. This is normal and will not affect how the medicine works. · This medicine can cause diarrhea. Call your doctor if the diarrhea becomes severe, does not stop, or is bloody. Do not take any medicine to stop diarrhea until you have talked to your doctor. Diarrhea can occur 2 months or more after you stop taking this medicine. · Use this medicine only to treat the infection you now have. This medicine will not work for colds, flu, or other virus infections. Possible Side Effects While Using This Medicine:  
Call your doctor right away if you notice any of these side effects: · Allergic reaction: Itching or hives, swelling in your face or hands, swelling or tingling in your mouth or throat, chest tightness, trouble breathing · Blisters, peeling, or red skin rash. · Cough, fever, chills, weakness, shortness of breath, or chest pain. · Dark-colored urine or pale stools. · Diarrhea or loose, watery stools that may contain blood. · Nausea, vomiting, loss of appetite, or pain in your upper stomach. · Numbness, tingling, or burning pain in your hands, arms, legs, or feet. · Yellowing of your skin or the whites of your eyes. If you notice these less serious side effects, talk with your doctor: · Dizziness, headache, or blurred vision. · Hair loss. · Mild nausea, vomiting, constipation, stomach upset, or pain. · Vaginal itching or fluids. If you notice other side effects that you think are caused by this medicine, tell your doctor. Call your doctor for medical advice about side effects. You may report side effects to FDA at 1-195-CEK-8349 © 2017 [facility] Information is for End User's use only and may not be sold, redistributed or otherwise used for commercial purposes. The above information is an  only. It is not intended as medical advice for individual conditions or treatments. Talk to your doctor, nurse or pharmacist before following any medical regimen to see if it is safe and effective for you. Introducing [address]! Dear Sam Maldonado: Thank you for requesting a [email or web address] account. Our records indicate that you already have an active [email or web address] account. You can access your account anytime at [email or web address] Did you know that you can access your hospital and ER discharge instructions at any time in [email or web address]? You can also review all of your test results from your hospital stay or ER visit. Additional Information If you have questions, please visit the Frequently Asked Questions section of the [email or web address] website at [email or web address] Remember, [email or web address] is NOT to be used for urgent needs. For medical emergencies, dial 911. Now available from your iPhone and Android! Please provide this summary of care documentation to your next provider. Your primary care clinician is listed as Robert Heck. If you have any questions after today's visit, please call [phone].

## 2017-09-28 NOTE — PATIENT INSTRUCTIONS
Nitrofurantoin Combination (By mouth)   Nitrofurantoin Monohydrate (siz-dnno-mtzn-AN-toyn qep-yb-MDA-drate), Nitrofurantoin, Macrocrystals (cnv-gagq-aijj-AN-toyn MBT-qhp-wahc-tals)  Treats urinary tract infections caused by bacteria. This medicine is an antibiotic. Brand Name(s): Macrobid   There may be other brand names for this medicine. When This Medicine Should Not Be Used: You should not use this medicine if you have had an allergic reaction to nitrofurantoin or if you are in your last weeks of pregnancy (week 38 or later). You should not use this medicine if you have severe kidney disease, if you are unable to urinate, or if you have a decreased amount of urine. Do not use this medicine if you have a history of liver disease with nitrofurantoin. How to Use This Medicine:   Capsule  · Your doctor will tell you how much medicine to use. Do not use more than directed. · It is best to take this medicine with food or milk. · Take all of the medicine in your prescription to clear up your infection, even if you feel better after the first few doses. If a dose is missed:   · Take a dose as soon as you remember. If it is almost time for your next dose, wait until then and take a regular dose. Do not take extra medicine to make up for a missed dose. How to Store and Dispose of This Medicine:   · Store the medicine in a closed container at room temperature, away from heat, moisture, and direct light. · Ask your pharmacist, doctor, or health caregiver about the best way to dispose of any outdated medicine or medicine no longer needed. · Keep all medicine out of the reach of children. Never share your medicine with anyone. Drugs and Foods to Avoid:   Ask your doctor or pharmacist before using any other medicine, including over-the-counter medicines, vitamins, and herbal products. · Make sure your doctor knows if you are also using probenecid (Benemid®) or sulfinpyrazone (Anturane®).   · It is best not to use antacids containing magnesium trisilicate (such as Foamicon® or Gaviscon®) while you are using nitrofurantoin. Warnings While Using This Medicine:   · Make sure your doctor knows if you are pregnant or breastfeeding, or if you have liver disease, heart disease, lung disease, anemia, diabetes, a mineral imbalance in the blood, or vitamin B deficiency. Make sure your doctor knows if you have a condition called [id number]. · This medicine may cause your urine to be a brown color. This is normal and will not affect how the medicine works. · This medicine can cause diarrhea. Call your doctor if the diarrhea becomes severe, does not stop, or is bloody. Do not take any medicine to stop diarrhea until you have talked to your doctor. Diarrhea can occur 2 months or more after you stop taking this medicine. · Use this medicine only to treat the infection you now have. This medicine will not work for colds, flu, or other virus infections. Possible Side Effects While Using This Medicine:   Call your doctor right away if you notice any of these side effects:  · Allergic reaction: Itching or hives, swelling in your face or hands, swelling or tingling in your mouth or throat, chest tightness, trouble breathing  · Blisters, peeling, or red skin rash. · Cough, fever, chills, weakness, shortness of breath, or chest pain. · Dark-colored urine or pale stools. · Diarrhea or loose, watery stools that may contain blood. · Nausea, vomiting, loss of appetite, or pain in your upper stomach. · Numbness, tingling, or burning pain in your hands, arms, legs, or feet. · Yellowing of your skin or the whites of your eyes. If you notice these less serious side effects, talk with your doctor:   · Dizziness, headache, or blurred vision. · Hair loss. · Mild nausea, vomiting, constipation, stomach upset, or pain. · Vaginal itching or fluids.   If you notice other side effects that you think are caused by this medicine, tell your doctor. Call your doctor for medical advice about side effects. You may report side effects to FDA at 8-795-FDA-1278  © 2017 [address] Information is for End User's use only and may not be sold, redistributed or otherwise used for commercial purposes. The above information is an  only. It is not intended as medical advice for individual conditions or treatments. Talk to your doctor, nurse or pharmacist before following any medical regimen to see if it is safe and effective for you.

## 2017-09-28 NOTE — PROGRESS NOTES
SUBJECTIVE:    Leonel Vila is a 56 y.o. female who complains of urinary frequency, urgency and dysuria x 3 days. Denies flank pain, chills, or abnormal vaginal discharge or bleeding. Recent UTIs:  January 2017n    OBJECTIVE: Appears well, in mild apparent distress. Vital signs are normal. The abdomen is soft without tenderness, guarding, mass, rebound or organomegaly. No CVA tenderness or inguinal adenopathy noted. Results for orders placed or performed in visit on 09/28/17   AMB POC URINALYSIS DIP STICK AUTO W/O MICRO     Status: Abnormal   Result Value Ref Range Status    Color (UA POC) Red  Final    Clarity (UA POC) Cloudy  Final    Glucose (UA POC) 3+ Negative Final    Bilirubin (UA POC) 3+ Negative Final    Ketones (UA POC) 2+ Negative Final    Specific gravity (UA POC) 1.030 1.001 - 1.035 Final    Blood (UA POC) 2+ Negative Final    pH (UA POC) 5.5 4.6 - 8.0 Final    Protein (UA POC) 3+ Negative mg/dL Final    Urobilinogen (UA POC) 8 mg/dL 0.2 - 1 Final    Nitrites (UA POC) Positive Negative Final    Leukocyte esterase (UA POC) 3+ Negative Final   Results for orders placed or performed in visit on 04/27/12   AMB POC URINALYSIS DIP STICK AUTO W/ MICRO     Status: None   Result Value Ref Range Status    Color (UA POC) Yellow (none) Final    Clarity (UA POC) Clear (none) Final    Glucose (UA POC) Negative (none) Final    Bilirubin (UA POC) Negative (none) Final    Ketones (UA POC) Negative (none) Final    Specific gravity (UA POC) 1.020 1.001 - 1.035 Final    Blood (UA POC) Trace (none) Final    pH (UA POC) 5.0 4.6 - 8.0 Final    Protein (UA POC) neg Negative mg/dL Final    Urobilinogen (UA POC) 0.2 mg/dL  Final    Nitrites (UA POC) Negative (none) Final    Leukocyte esterase (UA POC) Negative (none) Final    Narrative    Urine Microscopic:  WBC 0-2  RBC  0-2  EPITHELIAL CELLS none   BACTERIA none        ASSESSMENT:   UTI, uncomplicated.       PLAN:   Orders Placed This Encounter    CULTURE, URINE    URINALYSIS W/ RFLX MICROSCOPIC    AMB POC URINALYSIS DIP STICK AUTO W/O MICRO    dextroamphetamine-amphetamine (ADDERALL) 15 mg tablet    phenazopyridine (PYRIDIUM) 100 mg tablet    nitrofurantoin, macrocrystal-monohydrate, (MACROBID) 100 mg capsule     Drink a usual amount of fluids. May use Pyridium, Azo, Uristat OTC prn. Discussed se of these meds and pt will hold. Call or return to clinic prn if these symptoms worsen or fail to improve as anticipated. This note will not be viewable in [address]     Follow up incontinece at next visit

## 2017-09-30 ENCOUNTER — APPOINTMENT (OUTPATIENT)
Dept: ULTRASOUND IMAGING | Age: 57
DRG: 872 | End: 2017-09-30
Attending: INTERNAL MEDICINE
Payer: COMMERCIAL

## 2017-09-30 ENCOUNTER — APPOINTMENT (OUTPATIENT)
Dept: GENERAL RADIOLOGY | Age: 57
DRG: 872 | End: 2017-09-30
Attending: EMERGENCY MEDICINE
Payer: COMMERCIAL

## 2017-09-30 ENCOUNTER — TELEPHONE (OUTPATIENT)
Dept: INTERNAL MEDICINE CLINIC | Age: 57
End: 2017-09-30

## 2017-09-30 ENCOUNTER — HOSPITAL ENCOUNTER (INPATIENT)
Age: 57
LOS: 10 days | Discharge: HOME HEALTH | DRG: 872 | End: 2017-10-10
Attending: EMERGENCY MEDICINE | Admitting: INTERNAL MEDICINE
Payer: COMMERCIAL

## 2017-09-30 DIAGNOSIS — N12 PYELONEPHRITIS: ICD-10-CM

## 2017-09-30 DIAGNOSIS — A41.9 SEPSIS, DUE TO UNSPECIFIED ORGANISM: Primary | ICD-10-CM

## 2017-09-30 PROBLEM — I10 HTN (HYPERTENSION): Status: ACTIVE | Noted: 2017-09-30

## 2017-09-30 PROBLEM — R65.20 SEVERE SEPSIS (HCC): Status: ACTIVE | Noted: 2017-09-30

## 2017-09-30 PROBLEM — N17.9 AKI (ACUTE KIDNEY INJURY) (HCC): Status: ACTIVE | Noted: 2017-09-30

## 2017-09-30 PROBLEM — E87.1 HYPONATREMIA: Status: ACTIVE | Noted: 2017-09-30

## 2017-09-30 LAB
ALBUMIN SERPL-MCNC: 2.5 G/DL (ref 3.5–5)
ALBUMIN/GLOB SERPL: 0.6 {RATIO} (ref 1.1–2.2)
ALP SERPL-CCNC: 167 U/L (ref 45–117)
ALT SERPL-CCNC: 23 U/L (ref 12–78)
ANION GAP SERPL CALC-SCNC: 10 MMOL/L (ref 5–15)
APPEARANCE UR: ABNORMAL
AST SERPL-CCNC: 26 U/L (ref 15–37)
BACTERIA URNS QL MICRO: NEGATIVE /HPF
BASOPHILS # BLD: 0 K/UL (ref 0–0.1)
BASOPHILS NFR BLD: 0 % (ref 0–1)
BILIRUB SERPL-MCNC: 2 MG/DL (ref 0.2–1)
BILIRUB UR QL: NEGATIVE
BUN SERPL-MCNC: 11 MG/DL (ref 6–20)
BUN/CREAT SERPL: 9 (ref 12–20)
CALCIUM SERPL-MCNC: 8.5 MG/DL (ref 8.5–10.1)
CHLORIDE SERPL-SCNC: 93 MMOL/L (ref 97–108)
CO2 SERPL-SCNC: 23 MMOL/L (ref 21–32)
COLOR UR: ABNORMAL
CREAT SERPL-MCNC: 1.26 MG/DL (ref 0.55–1.02)
DIFFERENTIAL METHOD BLD: ABNORMAL
EOSINOPHIL # BLD: 0 K/UL (ref 0–0.4)
EOSINOPHIL NFR BLD: 0 % (ref 0–7)
EPITH CASTS URNS QL MICRO: ABNORMAL /LPF
ERYTHROCYTE [DISTWIDTH] IN BLOOD BY AUTOMATED COUNT: 13.4 % (ref 11.5–14.5)
GLOBULIN SER CALC-MCNC: 4.3 G/DL (ref 2–4)
GLUCOSE SERPL-MCNC: 105 MG/DL (ref 65–100)
GLUCOSE UR STRIP.AUTO-MCNC: NEGATIVE MG/DL
HCT VFR BLD AUTO: 36 % (ref 35–47)
HGB BLD-MCNC: 12.5 G/DL (ref 11.5–16)
HGB UR QL STRIP: ABNORMAL
KETONES UR QL STRIP.AUTO: NEGATIVE MG/DL
LACTATE SERPL-SCNC: 1.5 MMOL/L (ref 0.4–2)
LEUKOCYTE ESTERASE UR QL STRIP.AUTO: ABNORMAL
LYMPHOCYTES # BLD: 0.7 K/UL (ref 0.8–3.5)
LYMPHOCYTES NFR BLD: 8 % (ref 12–49)
MCH RBC QN AUTO: 30.8 PG (ref 26–34)
MCHC RBC AUTO-ENTMCNC: 34.7 G/DL (ref 30–36.5)
MCV RBC AUTO: 88.7 FL (ref 80–99)
MONOCYTES # BLD: 0.8 K/UL (ref 0–1)
MONOCYTES NFR BLD: 9 % (ref 5–13)
NEUTS SEG # BLD: 7.5 K/UL (ref 1.8–8)
NEUTS SEG NFR BLD: 83 % (ref 32–75)
NITRITE UR QL STRIP.AUTO: NEGATIVE
OSMOLALITY SERPL: 265 MOSM/KG H2O
OSMOLALITY UR: 56 MOSM/KG H2O
PH UR STRIP: 6 [PH] (ref 5–8)
PLATELET # BLD AUTO: 167 K/UL (ref 150–400)
POTASSIUM SERPL-SCNC: 3.1 MMOL/L (ref 3.5–5.1)
PROT SERPL-MCNC: 6.8 G/DL (ref 6.4–8.2)
PROT UR STRIP-MCNC: NEGATIVE MG/DL
RBC # BLD AUTO: 4.06 M/UL (ref 3.8–5.2)
RBC #/AREA URNS HPF: ABNORMAL /HPF (ref 0–5)
RBC MORPH BLD: ABNORMAL
SODIUM SERPL-SCNC: 126 MMOL/L (ref 136–145)
SODIUM UR-SCNC: 12 MMOL/L
SP GR UR REFRACTOMETRY: <1.005 (ref 1–1.03)
UA: UC IF INDICATED,UAUC: ABNORMAL
UROBILINOGEN UR QL STRIP.AUTO: 1 EU/DL (ref 0.2–1)
WBC # BLD AUTO: 9 K/UL (ref 3.6–11)
WBC URNS QL MICRO: ABNORMAL /HPF (ref 0–4)

## 2017-09-30 PROCEDURE — 99285 EMERGENCY DEPT VISIT HI MDM: CPT

## 2017-09-30 PROCEDURE — 74011250637 HC RX REV CODE- 250/637: Performed by: INTERNAL MEDICINE

## 2017-09-30 PROCEDURE — 87040 BLOOD CULTURE FOR BACTERIA: CPT | Performed by: EMERGENCY MEDICINE

## 2017-09-30 PROCEDURE — 93005 ELECTROCARDIOGRAM TRACING: CPT

## 2017-09-30 PROCEDURE — 74011000250 HC RX REV CODE- 250: Performed by: INTERNAL MEDICINE

## 2017-09-30 PROCEDURE — 74011250636 HC RX REV CODE- 250/636: Performed by: INTERNAL MEDICINE

## 2017-09-30 PROCEDURE — 94640 AIRWAY INHALATION TREATMENT: CPT

## 2017-09-30 PROCEDURE — 83605 ASSAY OF LACTIC ACID: CPT | Performed by: EMERGENCY MEDICINE

## 2017-09-30 PROCEDURE — 87086 URINE CULTURE/COLONY COUNT: CPT | Performed by: EMERGENCY MEDICINE

## 2017-09-30 PROCEDURE — 83935 ASSAY OF URINE OSMOLALITY: CPT | Performed by: INTERNAL MEDICINE

## 2017-09-30 PROCEDURE — 74011000258 HC RX REV CODE- 258: Performed by: EMERGENCY MEDICINE

## 2017-09-30 PROCEDURE — 36415 COLL VENOUS BLD VENIPUNCTURE: CPT | Performed by: EMERGENCY MEDICINE

## 2017-09-30 PROCEDURE — 87186 SC STD MICRODIL/AGAR DIL: CPT | Performed by: EMERGENCY MEDICINE

## 2017-09-30 PROCEDURE — 84300 ASSAY OF URINE SODIUM: CPT | Performed by: INTERNAL MEDICINE

## 2017-09-30 PROCEDURE — 71010 XR CHEST PORT: CPT

## 2017-09-30 PROCEDURE — 51702 INSERT TEMP BLADDER CATH: CPT

## 2017-09-30 PROCEDURE — 77030011943

## 2017-09-30 PROCEDURE — 80053 COMPREHEN METABOLIC PANEL: CPT | Performed by: EMERGENCY MEDICINE

## 2017-09-30 PROCEDURE — 74011000258 HC RX REV CODE- 258: Performed by: INTERNAL MEDICINE

## 2017-09-30 PROCEDURE — 87077 CULTURE AEROBIC IDENTIFY: CPT | Performed by: EMERGENCY MEDICINE

## 2017-09-30 PROCEDURE — 83930 ASSAY OF BLOOD OSMOLALITY: CPT | Performed by: INTERNAL MEDICINE

## 2017-09-30 PROCEDURE — 77030034850

## 2017-09-30 PROCEDURE — 65660000000 HC RM CCU STEPDOWN

## 2017-09-30 PROCEDURE — 74011250636 HC RX REV CODE- 250/636: Performed by: EMERGENCY MEDICINE

## 2017-09-30 PROCEDURE — 81001 URINALYSIS AUTO W/SCOPE: CPT | Performed by: EMERGENCY MEDICINE

## 2017-09-30 PROCEDURE — 74011250637 HC RX REV CODE- 250/637: Performed by: EMERGENCY MEDICINE

## 2017-09-30 PROCEDURE — 85025 COMPLETE CBC W/AUTO DIFF WBC: CPT | Performed by: EMERGENCY MEDICINE

## 2017-09-30 RX ORDER — SODIUM CHLORIDE 0.9 % (FLUSH) 0.9 %
5-10 SYRINGE (ML) INJECTION EVERY 8 HOURS
Status: DISCONTINUED | OUTPATIENT
Start: 2017-09-30 | End: 2017-10-07

## 2017-09-30 RX ORDER — SODIUM CHLORIDE 0.9 % (FLUSH) 0.9 %
5-10 SYRINGE (ML) INJECTION AS NEEDED
Status: DISCONTINUED | OUTPATIENT
Start: 2017-09-30 | End: 2017-10-07

## 2017-09-30 RX ORDER — ACETAMINOPHEN 325 MG/1
650 TABLET ORAL
Status: COMPLETED | OUTPATIENT
Start: 2017-09-30 | End: 2017-09-30

## 2017-09-30 RX ORDER — CHOLECALCIFEROL (VITAMIN D3) 125 MCG
4000 CAPSULE ORAL
COMMUNITY
End: 2017-10-10

## 2017-09-30 RX ORDER — LORAZEPAM 1 MG/1
1 TABLET ORAL
COMMUNITY

## 2017-09-30 RX ORDER — DILTIAZEM HYDROCHLORIDE 180 MG/1
180 CAPSULE, COATED, EXTENDED RELEASE ORAL DAILY
Status: DISCONTINUED | OUTPATIENT
Start: 2017-10-01 | End: 2017-10-09

## 2017-09-30 RX ORDER — SIMVASTATIN 5 MG/1
10 TABLET, FILM COATED ORAL
Status: DISCONTINUED | OUTPATIENT
Start: 2017-09-30 | End: 2017-10-10 | Stop reason: HOSPADM

## 2017-09-30 RX ORDER — ALBUTEROL SULFATE 0.83 MG/ML
2.5 SOLUTION RESPIRATORY (INHALATION)
Status: DISCONTINUED | OUTPATIENT
Start: 2017-09-30 | End: 2017-10-10 | Stop reason: HOSPADM

## 2017-09-30 RX ORDER — NALOXONE HYDROCHLORIDE 0.4 MG/ML
0.4 INJECTION, SOLUTION INTRAMUSCULAR; INTRAVENOUS; SUBCUTANEOUS AS NEEDED
Status: DISCONTINUED | OUTPATIENT
Start: 2017-09-30 | End: 2017-10-10 | Stop reason: HOSPADM

## 2017-09-30 RX ORDER — LEVOTHYROXINE SODIUM 112 UG/1
112 TABLET ORAL
Status: DISCONTINUED | OUTPATIENT
Start: 2017-10-01 | End: 2017-10-10 | Stop reason: HOSPADM

## 2017-09-30 RX ORDER — ACETAMINOPHEN 325 MG/1
650 TABLET ORAL
Status: DISCONTINUED | OUTPATIENT
Start: 2017-09-30 | End: 2017-10-10 | Stop reason: HOSPADM

## 2017-09-30 RX ORDER — QUETIAPINE FUMARATE 25 MG/1
25-50 TABLET, FILM COATED ORAL
Status: ON HOLD | COMMUNITY
End: 2017-10-06

## 2017-09-30 RX ORDER — ONDANSETRON 2 MG/ML
4 INJECTION INTRAMUSCULAR; INTRAVENOUS
Status: DISCONTINUED | OUTPATIENT
Start: 2017-09-30 | End: 2017-10-10 | Stop reason: HOSPADM

## 2017-09-30 RX ORDER — QUETIAPINE FUMARATE 25 MG/1
25 TABLET, FILM COATED ORAL
Status: DISCONTINUED | OUTPATIENT
Start: 2017-09-30 | End: 2017-10-02

## 2017-09-30 RX ORDER — SODIUM CHLORIDE 9 MG/ML
100 INJECTION, SOLUTION INTRAVENOUS CONTINUOUS
Status: DISCONTINUED | OUTPATIENT
Start: 2017-09-30 | End: 2017-10-01

## 2017-09-30 RX ORDER — HYDROCODONE BITARTRATE AND ACETAMINOPHEN 5; 325 MG/1; MG/1
1 TABLET ORAL
Status: DISCONTINUED | OUTPATIENT
Start: 2017-09-30 | End: 2017-10-10 | Stop reason: HOSPADM

## 2017-09-30 RX ORDER — IBUPROFEN 400 MG/1
400 TABLET ORAL ONCE
Status: COMPLETED | OUTPATIENT
Start: 2017-10-01 | End: 2017-09-30

## 2017-09-30 RX ORDER — VENLAFAXINE HYDROCHLORIDE 150 MG/1
150 CAPSULE, EXTENDED RELEASE ORAL
Status: DISCONTINUED | OUTPATIENT
Start: 2017-10-01 | End: 2017-10-02

## 2017-09-30 RX ORDER — ENOXAPARIN SODIUM 100 MG/ML
40 INJECTION SUBCUTANEOUS EVERY 12 HOURS
Status: DISCONTINUED | OUTPATIENT
Start: 2017-09-30 | End: 2017-10-10 | Stop reason: HOSPADM

## 2017-09-30 RX ORDER — LORAZEPAM 1 MG/1
1 TABLET ORAL 2 TIMES DAILY
Status: DISCONTINUED | OUTPATIENT
Start: 2017-09-30 | End: 2017-10-10 | Stop reason: HOSPADM

## 2017-09-30 RX ORDER — DEXTROAMPHETAMINE SACCHARATE, AMPHETAMINE ASPARTATE, DEXTROAMPHETAMINE SULFATE AND AMPHETAMINE SULFATE 3.75; 3.75; 3.75; 3.75 MG/1; MG/1; MG/1; MG/1
15 TABLET ORAL
COMMUNITY
End: 2017-10-10

## 2017-09-30 RX ORDER — CITALOPRAM 20 MG/1
20 TABLET, FILM COATED ORAL DAILY
Status: DISCONTINUED | OUTPATIENT
Start: 2017-10-01 | End: 2017-10-02

## 2017-09-30 RX ADMIN — Medication 10 ML: at 21:54

## 2017-09-30 RX ADMIN — ALBUTEROL SULFATE 2.5 MG: 2.5 SOLUTION RESPIRATORY (INHALATION) at 22:12

## 2017-09-30 RX ADMIN — IBUPROFEN 400 MG: 400 TABLET, FILM COATED ORAL at 23:33

## 2017-09-30 RX ADMIN — SIMVASTATIN 10 MG: 5 TABLET, FILM COATED ORAL at 22:07

## 2017-09-30 RX ADMIN — ACETAMINOPHEN 650 MG: 325 TABLET ORAL at 21:46

## 2017-09-30 RX ADMIN — SODIUM CHLORIDE 125 ML/HR: 900 INJECTION, SOLUTION INTRAVENOUS at 19:13

## 2017-09-30 RX ADMIN — QUETIAPINE FUMARATE 25 MG: 25 TABLET ORAL at 22:07

## 2017-09-30 RX ADMIN — CEFTRIAXONE SODIUM 1 G: 1 INJECTION, POWDER, FOR SOLUTION INTRAMUSCULAR; INTRAVENOUS at 17:36

## 2017-09-30 RX ADMIN — ACETAMINOPHEN 650 MG: 325 TABLET ORAL at 16:27

## 2017-09-30 RX ADMIN — Medication 10 ML: at 17:32

## 2017-09-30 RX ADMIN — LORAZEPAM 1 MG: 1 TABLET ORAL at 22:07

## 2017-09-30 RX ADMIN — SODIUM CHLORIDE 3702 ML: 900 INJECTION, SOLUTION INTRAVENOUS at 16:30

## 2017-09-30 RX ADMIN — PIPERACILLIN SODIUM AND TAZOBACTAM SODIUM 3.38 G: 3; .375 INJECTION, POWDER, LYOPHILIZED, FOR SOLUTION INTRAVENOUS at 23:37

## 2017-09-30 NOTE — ROUTINE PROCESS
TRANSFER - OUT REPORT:    Verbal report given to [facility] on Olamide Arvizu  being transferred to [facility](unit) for routine progression of care       Report consisted of patients Situation, Background, Assessment and   Recommendations(SBAR). Information from the following report(s) SBAR, ED Summary and MAR was reviewed with the receiving nurse. Lines:   Peripheral IV 09/30/17 Left Hand (Active)   Site Assessment Clean, dry, & intact 9/30/2017  4:54 PM   Phlebitis Assessment 0 9/30/2017  4:54 PM   Infiltration Assessment 0 9/30/2017  4:54 PM   Dressing Status Clean, dry, & intact 9/30/2017  4:54 PM   Dressing Type Tape;Transparent 9/30/2017  4:54 PM   Hub Color/Line Status Green;Flushed 9/30/2017  4:54 PM   Action Taken Blood drawn 9/30/2017  4:54 PM   Alcohol Cap Used No 9/30/2017  4:54 PM       Peripheral IV 09/30/17 Right Hand (Active)   Site Assessment Clean, dry, & intact 9/30/2017  4:54 PM   Phlebitis Assessment 0 9/30/2017  4:54 PM   Infiltration Assessment 0 9/30/2017  4:54 PM   Dressing Status Clean, dry, & intact 9/30/2017  4:54 PM   Dressing Type Tape;Transparent 9/30/2017  4:54 PM   Hub Color/Line Status Green;Flushed 9/30/2017  4:54 PM   Action Taken Blood drawn 9/30/2017  4:54 PM   Alcohol Cap Used No 9/30/2017  4:54 PM        Opportunity for questions and clarification was provided.       Patient transported with:   Registered Nurse

## 2017-09-30 NOTE — TELEPHONE ENCOUNTER
calls stating wife seen yesterday and stared on  macrobid for uti but now with fever of 101.4 and delirious and having trouble moving.  I advised er lynette and he states he will take her to [facility] ed now but if can't get her in car will call [phone]

## 2017-09-30 NOTE — PROGRESS NOTES
Bedside and Verbal shift change report given to Shayy (oncoming nurse) by Skyla Reilly (offgoing nurse). Report included the following information SBAR, Kardex, MAR, Accordion and Recent Results.

## 2017-09-30 NOTE — PROGRESS NOTES
[facility] Dosing Services: 9/30/17    Pharmacy note for Dr Castro Brandt regarding Lovenox dose adjustment for increased BMI:      Wt Readings from Last 1 Encounters:   09/30/17 123.4 kg (272 lb)       Ht Readings from Last 1 Encounters:   09/30/17 157.5 cm (62\")           Previous Dose 40mg sq q 24h   Creatinine Clearance Estimated Creatinine Clearance: 63.2 mL/min (based on Cr of 1.26). Creatinine Lab Results   Component Value Date/Time    Creatinine 1.26 09/30/2017 04:22 PM    Creatinine (POC) 0.8 08/18/2012 07:11 PM       Platelet Lab Results   Component Value Date/Time    PLATELET 322 17/23/6979 04:22 PM      H/H Lab Results   Component Value Date/Time    HGB 12.5 09/30/2017 04:22 PM         Pharmacist made change to enoxaparin therapy based on:    [ x ] BMI: dose changed to: 40mg sq q 12h    - Pharmacy to automatically make dose adjustment for obesity (BMI greater than 40)  - Pharmacy to make dose rounding adjustments per [facility] dose adjustment scale.       Romero Perez, PHARMD . Contact information: [phone]

## 2017-09-30 NOTE — ED PROVIDER NOTES
HPI Comments: 56 y.o. female with past medical history significant for hypothyroid, anxiety, HTN, and hyperlipidemia who presents from home with chief complaint of fever. Pt reports fever for 3 days with maximum temperature 105 F accompanied by nausea, 2 days of headache, abdominal pain, lower back pain, urinary incontinence, and 1 episode of diarrhea today. Per Pt's , Pt has been confused for 1 day. Pt states she has \"had UTI since early September that never went away\". She was seen by her PCP 2 days ago who took UA and placed her on macrobid. Pt's  notes they have not yet received results of the UA. Per , Pt has not taken her medication except for macrobid today. He is unsure of hx of kidney infections. Pt reports previous appendectomy. There are no other acute medical concerns at this time. PCP: Cyndie Hammond MD    Note written by Georgia Carson, as dictated by Zaida Hernandez MD 4:09 PM    The history is provided by the patient. Past Medical History:   Diagnosis Date    Anxiety     Depression 8/19/2010    HTN (hypertension)     Hyperlipidemia LDL goal < 130     Hypothyroid     Psychotic disorder     Severe depression and anxiety diagnosed at 35 yo    Tobacco abuse        Past Surgical History:   Procedure Laterality Date    HX APPENDECTOMY      HX DILATION AND CURETTAGE           No family history on file. Social History     Social History    Marital status:      Spouse name: N/A    Number of children: N/A    Years of education: N/A     Occupational History    Not on file.      Social History Main Topics    Smoking status: Current Every Day Smoker     Packs/day: 1.00    Smokeless tobacco: Never Used      Comment:  1 ppd/40+ years    Alcohol use No    Drug use: No      Comment: 1 pack a day    Sexual activity: No     Other Topics Concern    Not on file     Social History Narrative            2 children        Not employed/filing for disability    Stopped working after children were born 22 yo and 11 yo         ALLERGIES: Egg    Review of Systems   Constitutional: Positive for fever. Gastrointestinal: Positive for abdominal pain, diarrhea and nausea. Genitourinary: Positive for enuresis. Musculoskeletal: Positive for back pain. Neurological: Positive for headaches. Psychiatric/Behavioral: Positive for confusion. All other systems reviewed and are negative. Vitals:    09/30/17 1548   BP: 145/72   Pulse: (!) 105   Resp: 24   Temp: (!) 101.6 °F (38.7 °C)   SpO2: 93%   Weight: 123.4 kg (272 lb)   Height: 5' 2\" (1.575 m)            Physical Exam   Constitutional: She is oriented to person, place, and time. She appears well-developed and well-nourished. No distress. HENT:   Head: Normocephalic and atraumatic. Eyes: Conjunctivae and EOM are normal. Pupils are equal, round, and reactive to light. Neck: Normal range of motion. Neck supple. Cardiovascular: Regular rhythm, normal heart sounds and intact distal pulses. Exam reveals no friction rub. No murmur heard. tachcyardic   Pulmonary/Chest: Effort normal and breath sounds normal. No respiratory distress. She has no wheezes. She has no rales. She exhibits no tenderness. Abdominal: Soft. Bowel sounds are normal. She exhibits no distension. There is no tenderness. There is no rebound and no guarding. Musculoskeletal: Normal range of motion. She exhibits no edema or tenderness. Neurological: She is alert and oriented to person, place, and time. Answers questions appropriately but some delirium at times   Skin: Skin is warm and dry. She is not diaphoretic. No pallor. Psychiatric: She has a normal mood and affect. Her behavior is normal.   Nursing note and vitals reviewed. MDM  Number of Diagnoses or Management Options  Pyelonephritis:   Sepsis, due to unspecified organism [facility]):   Diagnosis management comments: Concern for pyelonephritis.  Diarrhea x 1 today so no concern for cdiff at this point. If pt does not have uti or PNA will have to consider encephalitis, meningitis       Amount and/or Complexity of Data Reviewed  Clinical lab tests: ordered and reviewed  Tests in the radiology section of CPT®: ordered and reviewed  Obtain history from someone other than the patient: yes ()  Review and summarize past medical records: yes  Discuss the patient with other providers: yes (hospitalist)  Independent visualization of images, tracings, or specimens: yes (ekg)    Critical Care  Total time providing critical care: 30-74 minutes (Total critical care time spend exclusive of procedures:  35)    Patient Progress  Patient progress: stable    ED Course       Procedures  EKG interpretation: (Preliminary)  Rhythm: normal sinus rhythm; and regular . Rate (approx.): 100; Axis: normal; P wave: normal; QRS interval: normal ; ST/T wave: non-specific changes       CONSULT NOTE:  5:23 PM Daniel Yang MD spoke with Dr. China Mckeon, Consult for Hospitalist.  Discussed available diagnostic tests and clinical findings. He is in agreement with care plans as outlined. Dr. China Mckeon will admit Pt.    5:44 PM  Pt fluids still infusing. Meets sepsis by bilirubin and bump in creatinine though lactate nl and bp nl.  Spoke with pt and  who agree with plan

## 2017-09-30 NOTE — H&P
[address] [address]  [phone]    Admission History and Physical      NAME:  Serge Barriga   :   1960   MRN:  [id number]     PCP:  Estevan Norris MD     Date/Time:  2017         Subjective:     CHIEF COMPLAINT: \"I don't feel good\"     HISTORY OF PRESENT ILLNESS:     Ms. Ebony Dawkins is a 56 y.o.  female with PMH of HTN, XOL, anxiety and depression admitted for sepsis 2/2 UTI. Per pt report noted to hav fevers and suprapubic pressure early in the week. Went to her PCP 2 days prior to admission but did not see improvement in her symptoms with PO antibiotics so reported to the ED. Also noting R sided flank pain. No prior h/o pyelo but has had a UTI in the past.     Past Medical History:   Diagnosis Date    Anxiety     Depression 2010    HTN (hypertension)     Hyperlipidemia LDL goal < 130     Hypothyroid     Psychotic disorder     Severe depression and anxiety diagnosed at 33 yo    Tobacco abuse         Past Surgical History:   Procedure Laterality Date    HX APPENDECTOMY      HX DILATION AND CURETTAGE         Social History   Substance Use Topics    Smoking status: Current Every Day Smoker     Packs/day: 1.00    Smokeless tobacco: Never Used      Comment:  1 ppd/40+ years    Alcohol use No      FH:   HTN     Allergies   Allergen Reactions    Egg Other (comments)     Abdominal pain        Prior to Admission medications    Medication Sig Start Date End Date Taking? Authorizing Provider   cholecalciferol, vitamin D3, (VITAMIN D3) 2,000 unit tab Take 4,000 Units by mouth two (2) days a week. The patient takes 4000 units on Saturday and    Yes Historical Provider   dextroamphetamine-amphetamine (ADDERALL) 15 mg tablet Take 15 mg by mouth three (3) times daily as needed (focus, attention). Yes Historical Provider   LORazepam (ATIVAN) 1 mg tablet Take 1 mg by mouth two (2) times a day.    Yes Historical Provider   QUEtiapine (SEROQUEL) 25 mg tablet Take 25-50 mg by mouth nightly as needed (insomnia, anxiety). Yes Historical Provider   phenazopyridine (PYRIDIUM) 100 mg tablet Take 1 Tab by mouth three (3) times daily as needed for Pain for up to 3 days. Caution can cause orange staining on clothes from sweating, tears, urine 9/28/17 10/1/17 Yes Dee Yao MD   nitrofurantoin, macrocrystal-monohydrate, (MACROBID) 100 mg capsule Take 1 Cap by mouth two (2) times a day. 9/28/17  Yes Dee Yao MD   citalopram (CELEXA) 20 mg tablet TAKE 1 TABLET BY MOUTH EVERY MORNING 6/14/17  Yes Dee Yao MD   simvastatin (ZOCOR) 10 mg tablet TAKE 1 TABLET BY MOUTH NIGHTLY 6/2/17  Yes Dee Yao MD   cholecalciferol, vitamin D3, (VITAMIN D3) 2,000 unit tab Take 2,000 Units by mouth five (5) days a week. The patient takes 2000 units Monday, Tuesday, Wednesday, Thursday, Friday   Yes Historical Provider   venlafaxine-SR [facility] 150 mg capsule TAKE ONE CAPSULE BY MOUTH EVERY DAY 5/23/17  Yes Dee Yao MD   levothyroxine (SYNTHROID) 112 mcg tablet Take 112 mcg by mouth Daily (before breakfast). Yes Historical Provider   dilTIAZem CD (CARDIZEM CD) 180 mg ER capsule Take 1 Cap by mouth daily.  10/26/16  Yes Dee Yao MD         Review of Systems:  (bold if positive, if negative)    Gen:  Eyes:  ENT:  CVS:  Pulm:  GI:    :    MS:  Skin:  Psych:  Endo:    Hem:  Renal:    Neuro:     Fevers, chills, suprapubic pressure       Objective:      VITALS:    Vital signs reviewed; most recent are:    Visit Vitals    /79    Pulse 90    Temp (!) 101.6 °F (38.7 °C)    Resp 29    Ht 5' 2\" (1.575 m)    Wt 123.4 kg (272 lb)    SpO2 96%    BMI 49.75 kg/m2     SpO2 Readings from Last 6 Encounters:   09/30/17 96%   09/28/17 93%   06/08/17 95%   04/18/17 97%   03/16/17 96%   01/20/17 96%          Intake/Output Summary (Last 24 hours) at 09/30/17 1843  Last data filed at 09/30/17 1655   Gross per 24 hour   Intake                0 ml   Output              400 ml   Net             -400 ml            Exam:     Physical Exam:    Gen:  Well-developed, well-nourished, in no acute distress  HEENT:  Pink conjunctivae, PERRL, hearing intact to voice, moist mucous membranes  Neck:  Supple, without masses, thyroid non-tender  Resp:  No accessory muscle use, clear breath sounds without wheezes rales or rhonchi  Card:  No murmurs, normal S1, S2 without thrills, bruits or peripheral edema  Abd: Mild suprapubic tenderness   Lymph:  No cervical adenopathy  Musc:  No cyanosis or clubbing  Skin:  No rashes or ulcers, skin turgor is good  Neuro:  Cranial nerves 3-12 are grossly intact,  strength is 5/5 bilaterally, dorsi / plantarflexion strength is 5/5 bilaterally, follows commands appropriately  Psych: Slightly confused   R sided CVA tenderness       Labs:    Recent Labs      09/30/17   1622   WBC  9.0   HGB  12.5   HCT  36.0   PLT  167     Recent Labs      09/30/17   1622   NA  126*   K  3.1*   CL  93*   CO2  23   GLU  105*   BUN  11   CREA  1.26*   CA  8.5   ALB  2.5*   SGOT  26   ALT  23     No components found for: GLPOC  No results for input(s): PH, PCO2, PO2, HCO3, FIO2 in the last 72 hours. No results for input(s): INR in the last 72 hours. No lab exists for component: INREXT       Assessment/Plan:    Severe sepsis ([facility] [address] (9/30/2017) - 2/2 UTI +/- pyelo   -admit to tele   -start IVF's   -blood and urine cultures pending   -start IV ceftriaxone   -check US to eval for pyelo       JING (acute kidney injury) ([facility] [address] (9/30/2017) - mild.  Possibly 2/2 sepsis   -gentle IVF's       Hyponatremia (9/30/2017) - new since Jan 2017  -continue IVF's; repeat in the AM       Depression (8/19/2010) - no SI/HI currently   -continue home meds      Hypercholesteremia (8/19/2010)  -continue statin       Hypothyroid ()  -continue levothyroxine     Surrogate decision maker:      Total time spent with patient: [address] discussed with: Patient and Family    Discussed:  Code Status, Care Plan and D/C Planning    Prophylaxis:  Lovenox    Probable Disposition:  Home w/Family           ___________________________________________________    Attending Physician: Sandra Cortes MD

## 2017-09-30 NOTE — IP AVS SNAPSHOT
[address] 
[phone] Patient: Felisha Morrison MRN: [id number] GZ:[id number] You are allergic to the following Allergen Reactions Egg Other (comments) Abdominal pain Recent Documentation Height Weight BMI OB Status Smoking Status 1.575 m 125.9 kg 50.76 kg/m2 Postmenopausal Current Every Day Smoker Emergency Contacts Name Discharge Info Relation Home Work [place]  Spouse [3] [phone] [facility]  Mother [14] [phone] About your hospitalization You were admitted on:  September 30, 2017 You last received care in the:  [facility] 5M1 MED SURG 1 You were discharged on:  October 10, 2017 Unit phone number:  [phone] Why you were hospitalized Your primary diagnosis was:  Severe Sepsis (Hcc) Your diagnoses also included:  Oc (Acute Kidney Injury) (Hcc), Hyponatremia, Depression, Hypercholesteremia, Hypothyroid, Htn (Hypertension), Tobacco Abuse Providers Seen During Your Hospitalizations Provider Role Specialty Primary office phone Marti Rocha MD Attending Provider Emergency Medicine [phone] Merly Salinas MD Attending Provider Internal Medicine [phone] Halina Cain MD Attending Provider Internal Medicine [phone] Yehuda Souza MD Attending Provider Internal Medicine [phone] Klarissa Jewell MD Attending Provider Internal Medicine [phone] Your Primary Care Physician (PCP) Primary Care Physician Office Phone Office Fax Tcva Abbot [phone] Follow-up Information Follow up With Details Comments Contact Info Mireya Yin MD Go on 10/13/2017 Hospital follow-up appointment at 9:40AM.  [place]andy  [address] [facility] [address] [place] [address] 
[phone] Talon Fitch MD Go on 10/12/2017 Your appointment is at 12:40. Please arrive by 12:20 with photo ID, medication list and insurance care. [address] [facility] [address] [place] [address] 
[phone] [employer]  rolling walker [address] [facility] 3 [facility] [address] 
[phone] 6[address] [facility]  PT/OT [address] 
1st Floor [facility] [address] 
[phone] Your Appointments Friday October 13, 2017  9:40 AM EDT Office Visit with Monica Hernandez MD  
[facility] [address]) [address] [facility] 100[facility]  
[phone] Current Discharge Medication List  
  
START taking these medications Dose & Instructions Dispensing Information Comments Morning Noon Evening Bedtime  
 ibuprofen 200 mg tablet Commonly known as:  MOTRIN Your last dose was: Your next dose is:    
   
   
 Dose:  200 mg Take 1 Tab by mouth every six (6) hours as needed. Quantity:  10 Tab Refills:  0  
     
   
   
   
  
 nicotine 14 mg/24 hr patch Commonly known as:  Donzell Astudillo Your last dose was: Your next dose is:    
   
   
 Dose:  1 Patch 1 Patch by TransDERmal route daily for 14 days. Quantity:  14 Patch Refills:  0 CONTINUE these medications which have CHANGED Dose & Instructions Dispensing Information Comments Morning Noon Evening Bedtime  
 dextroamphetamine-amphetamine 15 mg tablet Commonly known as:  ADDERALL What changed:   
- when to take this 
- reasons to take this Your last dose was: Your next dose is:    
   
   
 Dose:  15 mg Take 1 Tab (15 mg total) by mouth two (2) times a day. Max Daily Amount: 30 mg  
 Quantity:  14 Tab Refills:  0  
     
   
   
   
  
 dilTIAZem  mg ER capsule Commonly known as:  CARDIZEM CD What changed:   
- medication strength - how much to take Your last dose was: Your next dose is:    
   
   
 Dose:  240 mg Take 1 Cap by mouth daily for 90 days. Indications: hypertension Quantity:  30 Cap Refills:  2 QUEtiapine 25 mg tablet Commonly known as:  SEROquel What changed:  how much to take Your last dose was: Your next dose is:    
   
   
 Dose:  50 mg Take 2 Tabs by mouth nightly as needed (insomnia, anxiety). Quantity:  14 Tab Refills:  0  
     
   
   
   
  
 venlafaxine-SR 75 mg capsule Commonly known as:  EFFEXOR-XR What changed:   
- medication strength 
- how much to take 
- how to take this - when to take this 
- additional instructions Your last dose was: Your next dose is:    
   
   
 Dose:  225 mg Take 3 Caps by mouth daily (with breakfast) for 14 days. Quantity:  42 Cap Refills:  0  
     
   
   
   
  
 VITAMIN D3 2,000 unit Tab Generic drug:  cholecalciferol (vitamin D3) What changed:  Another medication with the same name was removed. Continue taking this medication, and follow the directions you see here. Your last dose was: Your next dose is:    
   
   
 Dose:  2000 Units Take 2,000 Units by mouth five (5) days a week. The patient takes 2000 units Monday, Tuesday, Wednesday, Thursday, Friday Refills:  0 CONTINUE these medications which have NOT CHANGED Dose & Instructions Dispensing Information Comments Morning Noon Evening Bedtime LORazepam 1 mg tablet Commonly known as:  ATIVAN Your last dose was: Your next dose is:    
   
   
 Dose:  1 mg Take 1 mg by mouth two (2) times a day. Refills:  0  
     
   
   
   
  
 simvastatin 10 mg tablet Commonly known as:  ZOCOR Your last dose was: Your next dose is: TAKE 1 TABLET BY MOUTH NIGHTLY Quantity:  90 Tab Refills:  0 SYNTHROID 112 mcg tablet Generic drug:  levothyroxine Your last dose was: Your next dose is:    
   
   
 Dose:  112 mcg Take 112 mcg by mouth Daily (before breakfast). Refills:  0 STOP taking these medications   
 citalopram 20 mg tablet Commonly known as:  CELEXA  
   
  
 nitrofurantoin (macrocrystal-monohydrate) 100 mg capsule Commonly known as:  MACROBID  
   
  
 phenazopyridine 100 mg tablet Commonly known as:  PYRIDIUM  
   
  
  
ASK your doctor about these medications Dose & Instructions Dispensing Information Comments Morning Noon Evening Bedtime  
 phenazopyridine 100 mg tablet Commonly known as:  PYRIDIUM Ask about: Should I take this medication? Your last dose was: Your next dose is:    
   
   
 Dose:  100 mg Take 1 Tab by mouth three (3) times daily as needed for Pain for up to 3 days. Caution can cause orange staining on clothes from sweating, tears, urine Quantity:  9 Tab Refills:  0 Where to Get Your Medications Information on where to get these meds will be given to you by the nurse or doctor. ! Ask your nurse or doctor about these medications  
  dextroamphetamine-amphetamine 15 mg tablet  
 dilTIAZem  mg ER capsule  
 ibuprofen 200 mg tablet  
 nicotine 14 mg/24 hr patch  
 phenazopyridine 100 mg tablet QUEtiapine 25 mg tablet  
 venlafaxine-SR 75 mg capsule Discharge Instructions HOSPITALIST DISCHARGE INSTRUCTIONS 
NAME: Ibis Gil :  1960 MRN:  [id number] Date/Time:  10/6/2017 10:55 AM 
 
ADMIT DATE: 2017 DISCHARGE DATE: 10/6/2017 ADMITTING DIAGNOSIS: 
Kidney and urinary tract infection Kidney stone DISCHARGE DIAGNOSIS: 
As above MEDICATIONS: 
 
· It is important that you take the medication exactly as they are prescribed.   
· Keep your medication in the bottles provided by the pharmacist and keep a list of the medication names, dosages, and times to be taken in your wallet. · Do not take other medications without consulting your doctor. Pain Management: per above medications What to do at Home: 1. Be sure to take the antibiotic you have been prescribed to finish treatment for your infection 2. Take pyridium as needed for pain 3. Follow up with urology within one week to discuss stent removal 
4. Follow up with your primary care within one week to check your potassium Recommended diet:  Resume previous diet Recommended activity: Activity as tolerated If you experience any of the following symptoms then please call your primary care physician or return to the emergency room if you cannot get hold of your doctor: 
Fever, chills, nausea, vomiting, diarrhea, change in mentation, falling, bleeding, shortness of breath Follow Up: 
Dr. Rosalind Jensen MD  you are to call and set up an appointment to see them in 1 week. Information obtained by : 
I understand that if any problems occur once I am at home I am to contact my physician. I understand and acknowledge receipt of the instructions indicated above. Physician's or R.N.'s Signature                                                                  Date/Time Patient or Representative Signature                                                          Date/Time Discharge Orders None [place] Announcement We are excited to announce that we are making your provider's discharge notes available to you in Soundtracker.   You will see these notes when they are completed and signed by the physician that discharged you from your recent hospital stay. If you have any questions or concerns about any information you see in Calendly, please call the Health Information Department where you were seen or reach out to your Primary Care Provider for more information about your plan of care. Introducing [facility]! Dear Ruth Guo: Thank you for requesting a Calendly account. Our records indicate that you already have an active Calendly account. You can access your account anytime at [email or web address] Did you know that you can access your hospital and ER discharge instructions at any time in Calendly? You can also review all of your test results from your hospital stay or ER visit. Additional Information If you have questions, please visit the Frequently Asked Questions section of the Calendly website at [email or web address] Remember, Calendly is NOT to be used for urgent needs. For medical emergencies, dial 911. Now available from your iPhone and Android! General Information Please provide this summary of care documentation to your next provider. Patient Signature:  ____________________________________________________________ Date:  ____________________________________________________________  
  
Margaret Davis Provider Signature:  ____________________________________________________________ Date:  ____________________________________________________________

## 2017-09-30 NOTE — IP AVS SNAPSHOT
Jerman Mcgowan 
 
 
 [address] 
[phone] Patient: Steve Valdovinos MRN: [id number] Current Discharge Medication List  
  
START taking these medications Dose & Instructions Dispensing Information Comments Morning Noon Evening Bedtime  
 ibuprofen 200 mg tablet Commonly known as:  MOTRIN Your last dose was: Your next dose is:    
   
   
 Dose:  200 mg Take 1 Tab by mouth every six (6) hours as needed. Quantity:  10 Tab Refills:  0  
     
   
   
   
  
 nicotine 14 mg/24 hr patch Commonly known as:  Sol Reno Your last dose was: Your next dose is:    
   
   
 Dose:  1 Patch 1 Patch by TransDERmal route daily for 14 days. Quantity:  14 Patch Refills:  0 CONTINUE these medications which have CHANGED Dose & Instructions Dispensing Information Comments Morning Noon Evening Bedtime  
 dextroamphetamine-amphetamine 15 mg tablet Commonly known as:  ADDERALL What changed:   
- when to take this 
- reasons to take this Your last dose was: Your next dose is:    
   
   
 Dose:  15 mg Take 1 Tab (15 mg total) by mouth two (2) times a day. Max Daily Amount: 30 mg  
 Quantity:  14 Tab Refills:  0  
     
   
   
   
  
 dilTIAZem  mg ER capsule Commonly known as:  CARDIZEM CD What changed:   
- medication strength 
- how much to take Your last dose was: Your next dose is:    
   
   
 Dose:  240 mg Take 1 Cap by mouth daily for 90 days. Indications: hypertension Quantity:  30 Cap Refills:  2 QUEtiapine 25 mg tablet Commonly known as:  SEROquel What changed:  how much to take Your last dose was: Your next dose is:    
   
   
 Dose:  50 mg Take 2 Tabs by mouth nightly as needed (insomnia, anxiety). Quantity:  14 Tab Refills:  0 venlafaxine-SR 75 mg capsule Commonly known as:  EFFEXOR-XR What changed:   
- medication strength 
- how much to take 
- how to take this - when to take this 
- additional instructions Your last dose was: Your next dose is:    
   
   
 Dose:  225 mg Take 3 Caps by mouth daily (with breakfast) for 14 days. Quantity:  42 Cap Refills:  0  
     
   
   
   
  
 VITAMIN D3 2,000 unit Tab Generic drug:  cholecalciferol (vitamin D3) What changed:  Another medication with the same name was removed. Continue taking this medication, and follow the directions you see here. Your last dose was: Your next dose is:    
   
   
 Dose:  2000 Units Take 2,000 Units by mouth five (5) days a week. The patient takes 2000 units Monday, Tuesday, Wednesday, Thursday, Friday Refills:  0 CONTINUE these medications which have NOT CHANGED Dose & Instructions Dispensing Information Comments Morning Noon Evening Bedtime LORazepam 1 mg tablet Commonly known as:  ATIVAN Your last dose was: Your next dose is:    
   
   
 Dose:  1 mg Take 1 mg by mouth two (2) times a day. Refills:  0  
     
   
   
   
  
 simvastatin 10 mg tablet Commonly known as:  ZOCOR Your last dose was: Your next dose is: TAKE 1 TABLET BY MOUTH NIGHTLY Quantity:  90 Tab Refills:  0  
     
   
   
   
  
 SYNTHROID 112 mcg tablet Generic drug:  levothyroxine Your last dose was: Your next dose is:    
   
   
 Dose:  112 mcg Take 112 mcg by mouth Daily (before breakfast). Refills:  0 STOP taking these medications   
 citalopram 20 mg tablet Commonly known as:  CELEXA  
   
  
 nitrofurantoin (macrocrystal-monohydrate) 100 mg capsule Commonly known as:  MACROBID  
   
  
 phenazopyridine 100 mg tablet Commonly known as:  PYRIDIUM  
   
  
  
 ASK your doctor about these medications Dose & Instructions Dispensing Information Comments Morning Noon Evening Bedtime  
 phenazopyridine 100 mg tablet Commonly known as:  PYRIDIUM Ask about: Should I take this medication? Your last dose was: Your next dose is:    
   
   
 Dose:  100 mg Take 1 Tab by mouth three (3) times daily as needed for Pain for up to 3 days. Caution can cause orange staining on clothes from sweating, tears, urine Quantity:  9 Tab Refills:  0 Where to Get Your Medications Information on where to get these meds will be given to you by the nurse or doctor. ! Ask your nurse or doctor about these medications  
  dextroamphetamine-amphetamine 15 mg tablet  
 dilTIAZem  mg ER capsule  
 ibuprofen 200 mg tablet  
 nicotine 14 mg/24 hr patch  
 phenazopyridine 100 mg tablet QUEtiapine 25 mg tablet  
 venlafaxine-SR 75 mg capsule

## 2017-10-01 ENCOUNTER — APPOINTMENT (OUTPATIENT)
Dept: ULTRASOUND IMAGING | Age: 57
DRG: 872 | End: 2017-10-01
Attending: INTERNAL MEDICINE
Payer: COMMERCIAL

## 2017-10-01 LAB
ANION GAP SERPL CALC-SCNC: 8 MMOL/L (ref 5–15)
APPEARANCE UR: ABNORMAL
BACTERIA #/AREA URNS HPF: ABNORMAL /[HPF]
BACTERIA UR CULT: ABNORMAL
BASOPHILS # BLD: 0 K/UL (ref 0–0.1)
BASOPHILS NFR BLD: 0 % (ref 0–1)
BILIRUB UR QL STRIP: NEGATIVE
BUN SERPL-MCNC: 12 MG/DL (ref 6–20)
BUN/CREAT SERPL: 10 (ref 12–20)
CALCIUM SERPL-MCNC: 7.6 MG/DL (ref 8.5–10.1)
CASTS URNS MICRO: ABNORMAL
CASTS URNS QL MICRO: PRESENT /LPF
CHLORIDE SERPL-SCNC: 101 MMOL/L (ref 97–108)
CO2 SERPL-SCNC: 24 MMOL/L (ref 21–32)
COLOR UR: ABNORMAL
CREAT SERPL-MCNC: 1.17 MG/DL (ref 0.55–1.02)
EOSINOPHIL # BLD: 0 K/UL (ref 0–0.4)
EOSINOPHIL NFR BLD: 0 % (ref 0–7)
EPI CELLS #/AREA URNS HPF: >10 /HPF
ERYTHROCYTE [DISTWIDTH] IN BLOOD BY AUTOMATED COUNT: 13.6 % (ref 11.5–14.5)
GLUCOSE SERPL-MCNC: 100 MG/DL (ref 65–100)
GLUCOSE UR QL: NEGATIVE
HCT VFR BLD AUTO: 32.1 % (ref 35–47)
HGB BLD-MCNC: 10.8 G/DL (ref 11.5–16)
HGB UR QL STRIP: ABNORMAL
KETONES UR QL STRIP: ABNORMAL
LACTATE SERPL-SCNC: 0.9 MMOL/L (ref 0.4–2)
LEUKOCYTE ESTERASE UR QL STRIP: ABNORMAL
LYMPHOCYTES # BLD: 0.8 K/UL (ref 0.8–3.5)
LYMPHOCYTES NFR BLD: 12 % (ref 12–49)
MAGNESIUM SERPL-MCNC: 1.6 MG/DL (ref 1.6–2.4)
MCH RBC QN AUTO: 30.1 PG (ref 26–34)
MCHC RBC AUTO-ENTMCNC: 33.6 G/DL (ref 30–36.5)
MCV RBC AUTO: 89.4 FL (ref 80–99)
MICRO URNS: ABNORMAL
MONOCYTES # BLD: 0.7 K/UL (ref 0–1)
MONOCYTES NFR BLD: 10 % (ref 5–13)
MUCOUS THREADS URNS QL MICRO: PRESENT
NEUTS SEG # BLD: 5.2 K/UL (ref 1.8–8)
NEUTS SEG NFR BLD: 78 % (ref 32–75)
NITRITE UR QL STRIP: POSITIVE
PH UR STRIP: 6 [PH] (ref 5–7.5)
PLATELET # BLD AUTO: 152 K/UL (ref 150–400)
POTASSIUM SERPL-SCNC: 2.8 MMOL/L (ref 3.5–5.1)
PROT UR QL STRIP: ABNORMAL
RBC # BLD AUTO: 3.59 M/UL (ref 3.8–5.2)
RBC #/AREA URNS HPF: >30 /HPF
SODIUM SERPL-SCNC: 133 MMOL/L (ref 136–145)
SP GR UR: 1.03 (ref 1–1.03)
UROBILINOGEN UR STRIP-MCNC: 4 MG/DL (ref 0.2–1)
WBC # BLD AUTO: 6.7 K/UL (ref 3.6–11)
WBC #/AREA URNS HPF: >30 /HPF

## 2017-10-01 PROCEDURE — 36415 COLL VENOUS BLD VENIPUNCTURE: CPT | Performed by: INTERNAL MEDICINE

## 2017-10-01 PROCEDURE — 74011000250 HC RX REV CODE- 250: Performed by: INTERNAL MEDICINE

## 2017-10-01 PROCEDURE — 83735 ASSAY OF MAGNESIUM: CPT | Performed by: INTERNAL MEDICINE

## 2017-10-01 PROCEDURE — 74011250637 HC RX REV CODE- 250/637: Performed by: INTERNAL MEDICINE

## 2017-10-01 PROCEDURE — 76770 US EXAM ABDO BACK WALL COMP: CPT

## 2017-10-01 PROCEDURE — 74011250636 HC RX REV CODE- 250/636: Performed by: INTERNAL MEDICINE

## 2017-10-01 PROCEDURE — 94640 AIRWAY INHALATION TREATMENT: CPT

## 2017-10-01 PROCEDURE — 80048 BASIC METABOLIC PNL TOTAL CA: CPT | Performed by: INTERNAL MEDICINE

## 2017-10-01 PROCEDURE — 74011000258 HC RX REV CODE- 258: Performed by: INTERNAL MEDICINE

## 2017-10-01 PROCEDURE — 83605 ASSAY OF LACTIC ACID: CPT | Performed by: INTERNAL MEDICINE

## 2017-10-01 PROCEDURE — 85025 COMPLETE CBC W/AUTO DIFF WBC: CPT | Performed by: INTERNAL MEDICINE

## 2017-10-01 PROCEDURE — 65660000000 HC RM CCU STEPDOWN

## 2017-10-01 RX ORDER — POTASSIUM CHLORIDE 750 MG/1
40 TABLET, FILM COATED, EXTENDED RELEASE ORAL
Status: COMPLETED | OUTPATIENT
Start: 2017-10-01 | End: 2017-10-01

## 2017-10-01 RX ORDER — IBUPROFEN 200 MG
1 TABLET ORAL EVERY 24 HOURS
Status: DISCONTINUED | OUTPATIENT
Start: 2017-10-01 | End: 2017-10-07

## 2017-10-01 RX ORDER — POTASSIUM CHLORIDE 7.45 MG/ML
10 INJECTION INTRAVENOUS
Status: COMPLETED | OUTPATIENT
Start: 2017-10-01 | End: 2017-10-01

## 2017-10-01 RX ORDER — SODIUM CHLORIDE AND POTASSIUM CHLORIDE .9; .15 G/100ML; G/100ML
SOLUTION INTRAVENOUS CONTINUOUS
Status: DISCONTINUED | OUTPATIENT
Start: 2017-10-01 | End: 2017-10-07

## 2017-10-01 RX ORDER — MAGNESIUM SULFATE HEPTAHYDRATE 40 MG/ML
2 INJECTION, SOLUTION INTRAVENOUS ONCE
Status: COMPLETED | OUTPATIENT
Start: 2017-10-01 | End: 2017-10-01

## 2017-10-01 RX ADMIN — ACETAMINOPHEN 650 MG: 325 TABLET ORAL at 08:46

## 2017-10-01 RX ADMIN — Medication 10 ML: at 06:00

## 2017-10-01 RX ADMIN — HYDROCODONE BITARTRATE AND ACETAMINOPHEN 1 TABLET: 5; 325 TABLET ORAL at 15:50

## 2017-10-01 RX ADMIN — CITALOPRAM 20 MG: 20 TABLET, FILM COATED ORAL at 08:46

## 2017-10-01 RX ADMIN — SODIUM CHLORIDE AND POTASSIUM CHLORIDE: 9; 1.49 INJECTION, SOLUTION INTRAVENOUS at 03:27

## 2017-10-01 RX ADMIN — DEXTROAMPHETAMINE SACCHARATE, AMPHETAMINE ASPARTATE, DEXTROAMPHETAMINE SULFATE AND AMPHETAMINE SULFATE 15 MG: 2.5; 2.5; 2.5; 2.5 TABLET ORAL at 13:08

## 2017-10-01 RX ADMIN — DILTIAZEM HYDROCHLORIDE 180 MG: 180 CAPSULE, COATED, EXTENDED RELEASE ORAL at 08:47

## 2017-10-01 RX ADMIN — SIMVASTATIN 10 MG: 5 TABLET, FILM COATED ORAL at 22:07

## 2017-10-01 RX ADMIN — ALBUTEROL SULFATE 2.5 MG: 2.5 SOLUTION RESPIRATORY (INHALATION) at 09:46

## 2017-10-01 RX ADMIN — QUETIAPINE FUMARATE 25 MG: 25 TABLET ORAL at 20:25

## 2017-10-01 RX ADMIN — ACETAMINOPHEN 650 MG: 325 TABLET ORAL at 13:08

## 2017-10-01 RX ADMIN — ENOXAPARIN SODIUM 40 MG: 40 INJECTION SUBCUTANEOUS at 08:47

## 2017-10-01 RX ADMIN — HYDROCODONE BITARTRATE AND ACETAMINOPHEN 1 TABLET: 5; 325 TABLET ORAL at 22:07

## 2017-10-01 RX ADMIN — VENLAFAXINE HYDROCHLORIDE 150 MG: 150 CAPSULE, EXTENDED RELEASE ORAL at 08:47

## 2017-10-01 RX ADMIN — POTASSIUM CHLORIDE 10 MEQ: 10 INJECTION, SOLUTION INTRAVENOUS at 08:47

## 2017-10-01 RX ADMIN — POTASSIUM CHLORIDE 40 MEQ: 750 TABLET, FILM COATED, EXTENDED RELEASE ORAL at 03:26

## 2017-10-01 RX ADMIN — MAGNESIUM SULFATE HEPTAHYDRATE 2 G: 40 INJECTION, SOLUTION INTRAVENOUS at 03:27

## 2017-10-01 RX ADMIN — PIPERACILLIN SODIUM AND TAZOBACTAM SODIUM 3.38 G: 3; .375 INJECTION, POWDER, LYOPHILIZED, FOR SOLUTION INTRAVENOUS at 15:52

## 2017-10-01 RX ADMIN — LORAZEPAM 1 MG: 1 TABLET ORAL at 08:47

## 2017-10-01 RX ADMIN — ENOXAPARIN SODIUM 40 MG: 40 INJECTION SUBCUTANEOUS at 20:24

## 2017-10-01 RX ADMIN — LORAZEPAM 1 MG: 1 TABLET ORAL at 20:25

## 2017-10-01 RX ADMIN — PIPERACILLIN SODIUM AND TAZOBACTAM SODIUM 3.38 G: 3; .375 INJECTION, POWDER, LYOPHILIZED, FOR SOLUTION INTRAVENOUS at 08:48

## 2017-10-01 RX ADMIN — LEVOTHYROXINE SODIUM 112 MCG: 112 TABLET ORAL at 08:46

## 2017-10-01 RX ADMIN — Medication 10 ML: at 09:49

## 2017-10-01 RX ADMIN — POTASSIUM CHLORIDE 10 MEQ: 10 INJECTION, SOLUTION INTRAVENOUS at 09:48

## 2017-10-01 NOTE — PROGRESS NOTES
Bedside shift change report given to Fern Sewell RN (oncoming nurse) by Nancy Cardozo RN (offgoing nurse). Report included the following information SBAR, Kardex and MAR.

## 2017-10-01 NOTE — PROGRESS NOTES
Problem: Falls - Risk of  Goal: *Absence of Falls  Document Moises Fall Risk and appropriate interventions in the flowsheet.    Outcome: Progressing Towards Goal  Fall Risk Interventions:  Mobility Interventions: Bed/chair exit alarm           Medication Interventions: Bed/chair exit alarm     Elimination Interventions: Bed/chair exit alarm, Call light in reach

## 2017-10-01 NOTE — PROGRESS NOTES
Carlos [facility]  [address] [facility], [address] [place] [address]  [phone]      Medical Progress Note      NAME: Ivon Vee   :  1960  MRM:  [id number]    Date/Time: 10/1/2017          Subjective:     Chief Complaint:  F/u pyelo    Chart/notes/labs/studies reviewed, patient examined at bedside. Feels better. Still with R flank pain. F/c overnight now resolved. CTX was changed to Zosyn            Objective:       Vitals:          Last 24hrs VS reviewed since prior progress note. Most recent are:    Visit Vitals    /76 (BP 1 Location: Left arm)    Pulse 86    Temp 98.3 °F (36.8 °C)    Resp 14    Ht 5' 2\" (1.575 m)    Wt 123.4 kg (272 lb)    SpO2 95%    BMI 49.75 kg/m2     SpO2 Readings from Last 6 Encounters:   10/01/17 95%   17 93%   17 95%   17 97%   17 96%   17 96%          Intake/Output Summary (Last 24 hours) at 10/01/17 1302  Last data filed at 10/01/17 0800   Gross per 24 hour   Intake          2493.33 ml   Output             5250 ml   Net         -2756.67 ml          Exam:     Physical Exam:    Gen:  Chronically ill-appearing. Disheveled. No acute distress  HEENT:  Sclerae nonicteric, hearing intact to voice, mucous membranes moist  Neck:  Supple, without masses. Resp:  No accessory muscle use, CTAB without wheezes, rales, or rhonchi  Card: RRR, without m/r/g. Trace LE edema. Abd:  +bowel sounds, soft, nondistended. +CVA Tenderness. Neuro: Face symmetric, tongue midline, speech fluent, follows commands appropriately  Psych:  Alert, oriented x 3. Good insight. Depressed.  Tearful      Medications Reviewed: (see below)    Lab Data Reviewed: (see below)    ______________________________________________________________________    Medications:     Current Facility-Administered Medications   Medication Dose Route Frequency    0.9% sodium chloride with KCl 20 mEq/L infusion   IntraVENous CONTINUOUS    sodium chloride (NS) flush 5-10 mL  5-10 mL IntraVENous PRN    sodium chloride (NS) flush 5-10 mL  5-10 mL IntraVENous Q8H    sodium chloride (NS) flush 5-10 mL  5-10 mL IntraVENous PRN    acetaminophen (TYLENOL) tablet 650 mg  650 mg Oral Q4H PRN    HYDROcodone-acetaminophen (NORCO) 5-325 mg per tablet 1 Tab  1 Tab Oral Q4H PRN    naloxone (NARCAN) injection 0.4 mg  0.4 mg IntraVENous PRN    ondansetron (ZOFRAN) injection 4 mg  4 mg IntraVENous Q4H PRN    enoxaparin (LOVENOX) injection 40 mg  40 mg SubCUTAneous Q12H    sodium chloride (NS) flush 5-10 mL  5-10 mL IntraVENous PRN    dextroamphetamine-amphetamine (ADDERALL) tablet 15 mg  15 mg Oral TID PRN    LORazepam (ATIVAN) tablet 1 mg  1 mg Oral BID    QUEtiapine (SEROquel) tablet 25 mg  25 mg Oral QHS PRN    citalopram (CELEXA) tablet 20 mg  20 mg Oral DAILY    venlafaxine-SR (EFFEXOR-XR) capsule 150 mg  150 mg Oral DAILY WITH BREAKFAST    levothyroxine (SYNTHROID) tablet 112 mcg  112 mcg Oral ACB    dilTIAZem CD (CARDIZEM CD) capsule 180 mg  180 mg Oral DAILY    simvastatin (ZOCOR) tablet 10 mg  10 mg Oral QHS    albuterol (PROVENTIL VENTOLIN) nebulizer solution 2.5 mg  2.5 mg Nebulization Q4H PRN    piperacillin-tazobactam (ZOSYN) 3.375 g in 0.9% sodium chloride (MBP/ADV) 100 mL  3.375 g IntraVENous Q8H            Lab Review:     Recent Labs      10/01/17   0052  09/30/17   1622   WBC  6.7  9.0   HGB  10.8*  12.5   HCT  32.1*  36.0   PLT  152  167     Recent Labs      10/01/17   0052  09/30/17   1622   NA  133*  126*   K  2.8*  3.1*   CL  101  93*   CO2  24  23   GLU  100  105*   BUN  12  11   CREA  1.17*  1.26*   CA  7.6*  8.5   MG  1.6   --    ALB   --   2.5*   SGOT   --   26   ALT   --   23     No components found for: GLPOC  No results for input(s): PH, PCO2, PO2, HCO3, FIO2 in the last 72 hours. No results for input(s): INR in the last 72 hours.     No lab exists for component: INREXT  Lab Results   Component Value Date/Time    Specimen Description: BLOOD 04/20/2009 05:18 PM    Specimen Description: STOOL 04/20/2009 11:20 AM     Lab Results   Component Value Date/Time    Culture result: GRAM NEGATIVE RODS 09/30/2017 04:48 PM    Culture result: NO GROWTH AFTER 14 HOURS 09/30/2017 04:22 PM    Culture result:  09/30/2017 04:22 PM     GRAM NEGATIVE RODS GROWING IN 1 OF 2 BOTTLES DRAWN . ..RT. HAND SITE    Culture result:  09/30/2017 04:22 PM     PRELIMINARY RESULT OF GRAM NEGATIVE RODS  GROWING IN 1 OF 2 BOTTLES DRAWN  CALLED TO AND READ BACK BY  VINHRN AT 1231, 10/1/17 DB      Culture result: REMAINING BOTTLE(S) HAS/HAVE NO GROWTH SO FAR 09/30/2017 04:22 PM            Assessment / Plan:       Severe sepsis ([facility].): 2/2 UTI and likely pyelo. Now with evidence of bacteremia  -- agree with IV Zosyn for now, follow spec/sens. Repeat blood culture  -- cont IV hydration  -- renal US pending        Depression (8/19/2010): seems severe. Prior hospitalization at [facility]. Depressed, tearful  -- cont Celexa and Effexor and Ativan for now  -- check TSH      Hypercholesteremia (8/19/2010)  -- cont statin      Hypothyroid ()  -- check TSH. Cont LT4      JING (acute kidney injury) ([facility] (9/30/2017): mild, improved  -- monitor on IVF      Hyponatremia (9/30/2017): improved  -- monitor on IVF      HTN (hypertension) (9/30/2017): controlled  -- cont diltiazem      Hypokalemia:   -- replete K for hypokalemia. Follow Mg      Tobacco abuse:  -- smoking cessation      Total time spent with patient: 35 minutes.  D/w Dr. Saskia Perez discussed with: Patient, Nursing Staff and >50% of time spent in counseling and coordination of care    Discussed:  Care Plan and D/C Planning    Prophylaxis:  Lovenox    Disposition:  Home w/Family           ___________________________________________________    Attending Physician: Aram Erwin MD

## 2017-10-01 NOTE — PROGRESS NOTES
Primary Nurse Lesly Lopez, RN and Lilliam Kohler, RN performed a dual skin assessment on this patient Impairment noted- see wound doc flow sheet: red blanchable back and bottom. Kiko score is 20.    2145 Pt anxious, has temp, pt feels cold, wheezing. MD MAXWELL was called. Fluid was decreased, kyle treatment ordered, Zocor, Ativan and Seroquel reordered. Pt received Tylenol. Pt refused Lovenox. 2203 Respiratory therapist was called. 2230 Pt calm. Resting. 2314 VS: 101.2 Axillary, 171/81, 104 HR, 94%, 36 RR, NEWS score \"6\". MD was called further order will be placed in by MD Maxwell.    0232 98.8F Axillary, 85 HR, 26 RR, pt sleeping quietly. 0309 Pt's Potassium 2.8, MD Maxwell was notified. MD said he is ordering medication. Bedside and Verbal shift change report given to Eric Kaufman RN (oncoming nurse) by Adebayo Maciel RN (offgoing nurse). Report included the following information SBAR, Kardex and MAR.

## 2017-10-01 NOTE — PROGRESS NOTES
Dr. Naveen Oreilly requested pharmacy dose Zosyn for sepsis/pyelonephritis. Will dose at 3.375 grams IV q8H. CrCl now 63 ml/min. Pharmacy will follow.

## 2017-10-01 NOTE — PROGRESS NOTES
Bedside and Verbal shift change report given to William Byrne RN (oncoming nurse) by Alice Irby RN  (offgoing nurse). Report included the following information SBAR, Kardex and Recent Results    7682- Try to call report , @ present time she is unable to take report as she is getting another pt. ... 1515. Stefaynaldenlisbeth Pola TRANSFER - OUT REPORT:    Verbal report given to PADMINI RN(name) on Lauri Jacobo  being transferred to [facility] RN(unit) for routine progression of care       Report consisted of patients Situation, Background, Assessment and   Recommendations(SBAR). Information from the following report(s) SBAR, Kardex and Recent Results was reviewed with the receiving nurse. Lines:   Peripheral IV 09/30/17 Left Hand (Active)   Site Assessment Clean, dry, & intact 10/1/2017  8:00 AM   Phlebitis Assessment 0 10/1/2017  8:00 AM   Infiltration Assessment 0 10/1/2017  8:00 AM   Dressing Status Clean, dry, & intact 10/1/2017  8:00 AM   Dressing Type Transparent 10/1/2017  8:00 AM   Hub Color/Line Status Green; Infusing 10/1/2017  8:00 AM   Action Taken Open ports on tubing capped 10/1/2017  8:00 AM   Alcohol Cap Used Yes 10/1/2017  8:00 AM       Peripheral IV 09/30/17 Right Hand (Active)   Site Assessment Clean, dry, & intact 10/1/2017  8:00 AM   Phlebitis Assessment 0 10/1/2017  8:00 AM   Infiltration Assessment 0 10/1/2017  8:00 AM   Dressing Status Clean, dry, & intact 10/1/2017  8:00 AM   Dressing Type Transparent 10/1/2017  8:00 AM   Hub Color/Line Status Green;Capped;Flushed 10/1/2017  8:00 AM   Action Taken Open ports on tubing capped 10/1/2017  8:00 AM   Alcohol Cap Used Yes 10/1/2017  8:00 AM        Opportunity for questions and clarification was provided. Patient transported with:   Registered Nurse  Tech with bed. ...

## 2017-10-02 ENCOUNTER — APPOINTMENT (OUTPATIENT)
Dept: CT IMAGING | Age: 57
DRG: 872 | End: 2017-10-02
Attending: UROLOGY
Payer: COMMERCIAL

## 2017-10-02 ENCOUNTER — APPOINTMENT (OUTPATIENT)
Dept: ULTRASOUND IMAGING | Age: 57
DRG: 872 | End: 2017-10-02
Attending: INTERNAL MEDICINE
Payer: COMMERCIAL

## 2017-10-02 LAB
ALBUMIN SERPL-MCNC: 1.9 G/DL (ref 3.5–5)
ALBUMIN/GLOB SERPL: 0.5 {RATIO} (ref 1.1–2.2)
ALP SERPL-CCNC: 209 U/L (ref 45–117)
ALT SERPL-CCNC: 23 U/L (ref 12–78)
ANION GAP SERPL CALC-SCNC: 11 MMOL/L (ref 5–15)
AST SERPL-CCNC: 37 U/L (ref 15–37)
ATRIAL RATE: 99 BPM
BACTERIA SPEC CULT: ABNORMAL
BASOPHILS # BLD: 0 K/UL (ref 0–0.1)
BASOPHILS NFR BLD: 0 % (ref 0–1)
BILIRUB SERPL-MCNC: 1.7 MG/DL (ref 0.2–1)
BUN SERPL-MCNC: 9 MG/DL (ref 6–20)
BUN/CREAT SERPL: 9 (ref 12–20)
CALCIUM SERPL-MCNC: 8.1 MG/DL (ref 8.5–10.1)
CALCULATED P AXIS, ECG09: 66 DEGREES
CALCULATED R AXIS, ECG10: 64 DEGREES
CALCULATED T AXIS, ECG11: 74 DEGREES
CC UR VC: ABNORMAL
CHLORIDE SERPL-SCNC: 102 MMOL/L (ref 97–108)
CO2 SERPL-SCNC: 23 MMOL/L (ref 21–32)
CREAT SERPL-MCNC: 1.02 MG/DL (ref 0.55–1.02)
DIAGNOSIS, 93000: NORMAL
EOSINOPHIL # BLD: 0.1 K/UL (ref 0–0.4)
EOSINOPHIL NFR BLD: 1 % (ref 0–7)
ERYTHROCYTE [DISTWIDTH] IN BLOOD BY AUTOMATED COUNT: 13.9 % (ref 11.5–14.5)
GLOBULIN SER CALC-MCNC: 4 G/DL (ref 2–4)
GLUCOSE SERPL-MCNC: 83 MG/DL (ref 65–100)
HCT VFR BLD AUTO: 31.7 % (ref 35–47)
HGB BLD-MCNC: 10.7 G/DL (ref 11.5–16)
LYMPHOCYTES # BLD: 1.9 K/UL (ref 0.8–3.5)
LYMPHOCYTES NFR BLD: 26 % (ref 12–49)
MAGNESIUM SERPL-MCNC: 1.9 MG/DL (ref 1.6–2.4)
MCH RBC QN AUTO: 29.7 PG (ref 26–34)
MCHC RBC AUTO-ENTMCNC: 33.8 G/DL (ref 30–36.5)
MCV RBC AUTO: 88.1 FL (ref 80–99)
MONOCYTES # BLD: 0.4 K/UL (ref 0–1)
MONOCYTES NFR BLD: 6 % (ref 5–13)
NEUTS SEG # BLD: 4.8 K/UL (ref 1.8–8)
NEUTS SEG NFR BLD: 67 % (ref 32–75)
P-R INTERVAL, ECG05: 138 MS
PHOSPHATE SERPL-MCNC: 2.5 MG/DL (ref 2.6–4.7)
PLATELET # BLD AUTO: 170 K/UL (ref 150–400)
POTASSIUM SERPL-SCNC: 3.4 MMOL/L (ref 3.5–5.1)
PROT SERPL-MCNC: 5.9 G/DL (ref 6.4–8.2)
Q-T INTERVAL, ECG07: 342 MS
QRS DURATION, ECG06: 102 MS
QTC CALCULATION (BEZET), ECG08: 438 MS
RBC # BLD AUTO: 3.6 M/UL (ref 3.8–5.2)
RBC MORPH BLD: ABNORMAL
SERVICE CMNT-IMP: ABNORMAL
SODIUM SERPL-SCNC: 136 MMOL/L (ref 136–145)
TSH SERPL DL<=0.05 MIU/L-ACNC: 3.27 UIU/ML (ref 0.36–3.74)
VENTRICULAR RATE, ECG03: 99 BPM
WBC # BLD AUTO: 7.2 K/UL (ref 3.6–11)

## 2017-10-02 PROCEDURE — 83735 ASSAY OF MAGNESIUM: CPT | Performed by: INTERNAL MEDICINE

## 2017-10-02 PROCEDURE — 85025 COMPLETE CBC W/AUTO DIFF WBC: CPT | Performed by: INTERNAL MEDICINE

## 2017-10-02 PROCEDURE — 76705 ECHO EXAM OF ABDOMEN: CPT

## 2017-10-02 PROCEDURE — 74011250637 HC RX REV CODE- 250/637: Performed by: INTERNAL MEDICINE

## 2017-10-02 PROCEDURE — 65660000000 HC RM CCU STEPDOWN

## 2017-10-02 PROCEDURE — 36415 COLL VENOUS BLD VENIPUNCTURE: CPT | Performed by: INTERNAL MEDICINE

## 2017-10-02 PROCEDURE — 97530 THERAPEUTIC ACTIVITIES: CPT

## 2017-10-02 PROCEDURE — 74011250636 HC RX REV CODE- 250/636: Performed by: INTERNAL MEDICINE

## 2017-10-02 PROCEDURE — 84443 ASSAY THYROID STIM HORMONE: CPT | Performed by: INTERNAL MEDICINE

## 2017-10-02 PROCEDURE — 97535 SELF CARE MNGMENT TRAINING: CPT | Performed by: OCCUPATIONAL THERAPIST

## 2017-10-02 PROCEDURE — 74011000258 HC RX REV CODE- 258: Performed by: INTERNAL MEDICINE

## 2017-10-02 PROCEDURE — 87040 BLOOD CULTURE FOR BACTERIA: CPT | Performed by: INTERNAL MEDICINE

## 2017-10-02 PROCEDURE — 97161 PT EVAL LOW COMPLEX 20 MIN: CPT

## 2017-10-02 PROCEDURE — 74011250637 HC RX REV CODE- 250/637: Performed by: PSYCHIATRY & NEUROLOGY

## 2017-10-02 PROCEDURE — 74011636320 HC RX REV CODE- 636/320: Performed by: RADIOLOGY

## 2017-10-02 PROCEDURE — 97165 OT EVAL LOW COMPLEX 30 MIN: CPT | Performed by: OCCUPATIONAL THERAPIST

## 2017-10-02 PROCEDURE — 97116 GAIT TRAINING THERAPY: CPT

## 2017-10-02 PROCEDURE — 84100 ASSAY OF PHOSPHORUS: CPT | Performed by: INTERNAL MEDICINE

## 2017-10-02 PROCEDURE — 74177 CT ABD & PELVIS W/CONTRAST: CPT

## 2017-10-02 PROCEDURE — 80053 COMPREHEN METABOLIC PANEL: CPT | Performed by: INTERNAL MEDICINE

## 2017-10-02 RX ORDER — QUETIAPINE FUMARATE 25 MG/1
50 TABLET, FILM COATED ORAL
Status: DISCONTINUED | OUTPATIENT
Start: 2017-10-02 | End: 2017-10-10 | Stop reason: HOSPADM

## 2017-10-02 RX ADMIN — VENLAFAXINE HYDROCHLORIDE 150 MG: 150 CAPSULE, EXTENDED RELEASE ORAL at 07:54

## 2017-10-02 RX ADMIN — SIMVASTATIN 10 MG: 5 TABLET, FILM COATED ORAL at 23:13

## 2017-10-02 RX ADMIN — HYDROCODONE BITARTRATE AND ACETAMINOPHEN 1 TABLET: 5; 325 TABLET ORAL at 17:49

## 2017-10-02 RX ADMIN — LEVOTHYROXINE SODIUM 112 MCG: 112 TABLET ORAL at 06:55

## 2017-10-02 RX ADMIN — CITALOPRAM 20 MG: 20 TABLET, FILM COATED ORAL at 08:07

## 2017-10-02 RX ADMIN — DILTIAZEM HYDROCHLORIDE 180 MG: 180 CAPSULE, COATED, EXTENDED RELEASE ORAL at 08:07

## 2017-10-02 RX ADMIN — PIPERACILLIN SODIUM AND TAZOBACTAM SODIUM 3.38 G: 3; .375 INJECTION, POWDER, LYOPHILIZED, FOR SOLUTION INTRAVENOUS at 01:14

## 2017-10-02 RX ADMIN — HYDROCODONE BITARTRATE AND ACETAMINOPHEN 1 TABLET: 5; 325 TABLET ORAL at 01:14

## 2017-10-02 RX ADMIN — DEXTROAMPHETAMINE SACCHARATE, AMPHETAMINE ASPARTATE, DEXTROAMPHETAMINE SULFATE AND AMPHETAMINE SULFATE 15 MG: 2.5; 2.5; 2.5; 2.5 TABLET ORAL at 12:30

## 2017-10-02 RX ADMIN — IOPAMIDOL 95 ML: 755 INJECTION, SOLUTION INTRAVENOUS at 19:16

## 2017-10-02 RX ADMIN — Medication 10 ML: at 14:00

## 2017-10-02 RX ADMIN — CEFEPIME HYDROCHLORIDE 2 G: 2 INJECTION, POWDER, FOR SOLUTION INTRAVENOUS at 20:14

## 2017-10-02 RX ADMIN — LORAZEPAM 1 MG: 1 TABLET ORAL at 20:13

## 2017-10-02 RX ADMIN — PIPERACILLIN SODIUM AND TAZOBACTAM SODIUM 3.38 G: 3; .375 INJECTION, POWDER, LYOPHILIZED, FOR SOLUTION INTRAVENOUS at 07:52

## 2017-10-02 RX ADMIN — CEFEPIME HYDROCHLORIDE 2 G: 2 INJECTION, POWDER, FOR SOLUTION INTRAVENOUS at 12:23

## 2017-10-02 RX ADMIN — HYDROCODONE BITARTRATE AND ACETAMINOPHEN 1 TABLET: 5; 325 TABLET ORAL at 12:23

## 2017-10-02 RX ADMIN — SODIUM CHLORIDE AND POTASSIUM CHLORIDE: 9; 1.49 INJECTION, SOLUTION INTRAVENOUS at 20:12

## 2017-10-02 RX ADMIN — ENOXAPARIN SODIUM 40 MG: 40 INJECTION SUBCUTANEOUS at 20:13

## 2017-10-02 RX ADMIN — HYDROCODONE BITARTRATE AND ACETAMINOPHEN 1 TABLET: 5; 325 TABLET ORAL at 07:53

## 2017-10-02 RX ADMIN — ENOXAPARIN SODIUM 40 MG: 40 INJECTION SUBCUTANEOUS at 08:07

## 2017-10-02 RX ADMIN — QUETIAPINE FUMARATE 50 MG: 25 TABLET ORAL at 23:13

## 2017-10-02 RX ADMIN — LORAZEPAM 1 MG: 1 TABLET ORAL at 08:07

## 2017-10-02 NOTE — PROGRESS NOTES
CM Note:  Met with pt for d/c planning. PTA pt was independent with ADL's, driving and ambulating. She has a cane and crutches she uses sometimes. She had never had home health. Her mother and  help with housework and cooking. She stated it was difficult to manage the stairs and wanted to know if she could get a hospital bed to use on the ground floor. Her emergency contact is her , Nikki Cabezas ([phone]), who will transport her at d/c. Pt has Rx coverage and gets her medications from [facility]. Will continue to follow PT/OT for additional recommendations. She may need rehab in addition to a RW. MIL Morley    Care Management Interventions  PCP Verified by CM: Yes (PCP is Dr.Joyce Fontaine. -[address] notified.)  Palliative Care Criteria Met (RRAT>21 & CHF Dx)?: No  Transition of Care Consult (CM Consult): Other (RW order)  MyChart Signup: No  Discharge Durable Medical Equipment: Yes (RW)  Physical Therapy Consult: Yes  Occupational Therapy Consult: Yes  Speech Therapy Consult: No  Current Support Network: Lives with Spouse (Pt lives with her  in a 2 story house.   There are 15 steps to second floor and  3 entry steps.)  Confirm Follow Up Transport: Family (Either  or mother will transport her at d/c.)  Plan discussed with Pt/Family/Caregiver: Yes  Discharge Location  Discharge Placement:  (to be determined.)

## 2017-10-02 NOTE — PROGRESS NOTES
Problem: Self Care Deficits Care Plan (Adult)  Goal: *Acute Goals and Plan of Care (Insert Text)  Occupational Therapy Goals  Initiated 10/2/2017  1. Patient will maintain attention to functional task >or = 5 minutes with < or = 3 verbal cues within 7 day(s). 2. Patient will perform upper body dressing with supervision/set-up within 7 day(s). 3. Patient will stand with one hand support > or = 3 mintues with minimal assistance/contact guard assist within 7 day(s). 4. Patient will perform toilet transfers with minimal assistance/contact guard assist for stand pivot transfer within 7 day(s). 5. Patient will perform all aspects of toileting with moderate assistance within 7 day(s). 6. Patient will participate in upper extremity therapeutic exercise/activities with supervision/set-up for 10 minutes within 7 day(s). OCCUPATIONAL THERAPY EVALUATION  Patient: Digna Raygoza (89 y.o. female)  Date: 10/2/2017  Primary Diagnosis: Severe sepsis [facility])        Precautions:   Fall      ASSESSMENT :  Based on the objective data described below, the patient presents with significant confusion, decreased orientation, oriented to self only, closing eyes mid sentence and during functional tasks, unable to stand at this time however noted she ambulated ~3\" to chair with PT using rolling walker. Overall total assist LE ADL's, toileting, min assist grooming, UE ADL's. Will continue to follow. At this time patient unsafe home alone. Patient will benefit from skilled intervention to address the above impairments.   Patients rehabilitation potential is considered to be Guarded  Factors which may influence rehabilitation potential include:   [ ]             None noted  [X]             Mental ability/status  [X]             Medical condition  [X]             Home/family situation and support systems  [ ]             Safety awareness  [ ]             Pain tolerance/management  [ ]             Other:        PLAN :  Recommendations and Planned Interventions:  [X]               Self Care Training                  [X]        Therapeutic Activities  [X]               Functional Mobility Training    [X]        Cognitive Retraining  [X]               Therapeutic Exercises           [X]        Endurance Activities  [X]               Balance Training                   [ ]        Neuromuscular Re-Education  [ ]               Visual/Perceptual Training     [X]   Home Safety Training  [X]               Patient Education                 [X]        Family Training/Education  [ ]               Other (comment):     Frequency/Duration: Patient will be followed by occupational therapy 5 times a week to address goals. Discharge Recommendations: Jaime Dickson  Further Equipment Recommendations for Discharge: to be determined       SUBJECTIVE:   Patient stated Each square was breathing and making little comic strips for me.  patient stated when describing her room in ER      OBJECTIVE DATA SUMMARY:   HISTORY:   Past Medical History:   Diagnosis Date    Anxiety      Depression 8/19/2010    HTN (hypertension)      Hyperlipidemia LDL goal < 130      Hypothyroid      Psychotic disorder       Severe depression and anxiety diagnosed at 35 yo    Tobacco abuse       Past Surgical History:   Procedure Laterality Date    HX APPENDECTOMY        HX DILATION AND CURETTAGE            Prior Level of Function/Home Situation: ?, reports she was able to do basic ADL's, laundry, sits on loveseat most of day with dogs,  works, goes to therapist, per RN long hx Adderall (high dose), Mother lives nearby,   Expanded or extensive additional review of patient history:      Home Situation  Home Environment: Private residence  # Steps to Enter: 3  Rails to Enter: Yes  One/Two Story Residence: Two story  Living Alone: No  Support Systems: Family member(s), Spouse/Significant Other/Partner  Patient Expects to be Discharged to[de-identified] Unknown  Current DME Used/Available at Home: Cane, straight, Crutches  [ ]  Right hand dominant   [ ]  Left hand dominant     EXAMINATION OF PERFORMANCE DEFICITS:  Cognitive/Behavioral Status:  Neurologic State: Drowsy; Confused  Orientation Level: Disoriented to place; Disoriented to situation;Disoriented to time;Oriented to person  Cognition: Decreased command following;Decreased attention/concentration;Memory loss; Impaired decision making;Poor safety awareness  Perception: Appears intact  Perseveration: Perseverates during conversation  Safety/Judgement: Decreased awareness of need for assistance;Decreased awareness of environment;Decreased awareness of need for safety;Decreased insight into deficits; Lack of insight into deficits     Skin: intact, tan, noted swimsuit lines on shoulder     Edema: none noted     Hearing: Auditory  Auditory Impairment: None     Vision/Perceptual:                                      Range of Motion:  AROM: Generally decreased, functional                          Strength:  Strength: Generally decreased, functional                 Coordination:  Coordination: Generally decreased, functional  Fine Motor Skills-Upper: Left Intact; Right Intact          Tone & Sensation:  Tone: Normal  Sensation: Intact                       Balance:  Sitting: Intact  Standing: Impaired  Standing - Static: Constant support;Poor     Functional Mobility and Transfers for ADLs:  Bed Mobility:  Supine to Sit: Additional time; Moderate assistance     Transfers:  Sit to Stand: Total assistance  Stand to Sit: Total assistance  Bed to Chair: Total assistance  Toilet Transfer : Total assistance     ADL Assessment:  Feeding: Minimum assistance; Additional time     Oral Facial Hygiene/Grooming: Minimum assistance; Additional time     Bathing: Maximum assistance     Upper Body Dressing: Moderate assistance     Lower Body Dressing: Total assistance     Toileting:  Total assistance                 ADL Intervention and task modifications: Educated on role of OT, difference between OT and PT per her inquiry, use of call bell and phone, step by step assist provided to make call to her therapist, set-up to perform oral care, stated \"this is a mess\" following task as she missed basin to spit in multiple times. Re-oriented to place, situation and time     Cognitive Retraining  Safety/Judgement: Decreased awareness of need for assistance;Decreased awareness of environment;Decreased awareness of need for safety;Decreased insight into deficits; Lack of insight into deficits     Functional Measure:  Barthel Index:      Bathin  Bladder: 0  Bowels: 5  Groomin  Dressin  Feedin  Mobility: 0  Stairs: 0  Toilet Use: 0  Transfer (Bed to Chair and Back): 5  Total: 15         Barthel and G-code impairment scale:  Percentage of impairment CH  0% CI  1-19% CJ  20-39% CK  40-59% CL  60-79% CM  80-99% CN  100%   Barthel Score 0-100 100 99-80 79-60 59-40 20-39 1-19    0   Barthel Score 0-20 20 17-19 13-16 9-12 5-8 1-4 0      The Barthel ADL Index: Guidelines  1. The index should be used as a record of what a patient does, not as a record of what a patient could do. 2. The main aim is to establish degree of independence from any help, physical or verbal, however minor and for whatever reason. 3. The need for supervision renders the patient not independent. 4. A patient's performance should be established using the best available evidence. Asking the patient, friends/relatives and nurses are the usual sources, but direct observation and common sense are also important. However direct testing is not needed. 5. Usually the patient's performance over the preceding 24-48 hours is important, but occasionally longer periods will be relevant. 6. Middle categories imply that the patient supplies over 50 per cent of the effort. 7. Use of aids to be independent is allowed. Irina Rico., Barthel, D.W. ([id number]). Functional evaluation: the Barthel Index.  [facility] ([phone]. Indujayla Dover james ASHLEY Pichardo, Javier Hernandez.[facility]., Ricardo Perla, [address] (1999). Measuring the change indisability after inpatient rehabilitation; comparison of the responsiveness of the Barthel Index and Functional Welcome Measure. Journal of Neurology, Neurosurgery, and Psychiatry, 66(4), 421-164. TOÑO Caldwell, NURY Cantrell, & Franchesca Pond M.A. (2004.) Assessment of post-stroke quality of life in cost-effectiveness studies: The usefulness of the Barthel Index and the EuroQoL-5D. Quality of Life Research, 13, 911-82            G codes: In compliance with CMSs Claims Based Outcome Reporting, the following G-code set was chosen for this patient based on their primary functional limitation being treated: The outcome measure chosen to determine the severity of the functional limitation was the Barthel Index with a score of 15/100 which was correlated with the impairment scale. · Self Care:               - CURRENT STATUS:    CM - 80%-99% impaired, limited or restricted               - GOAL STATUS:           CL - 60%-79% impaired, limited or restricted               - D/C STATUS:                       ---------------To be determined---------------      Occupational Therapy Evaluation Charge Determination   History Examination Decision-Making   LOW Complexity : Brief history review  LOW Complexity : 1-3 performance deficits relating to physical, cognitive , or psychosocial skils that result in activity limitations and / or participation restrictions  LOW Complexity : No comorbidities that affect functional and no verbal or physical assistance needed to complete eval tasks       Based on the above components, the patient evaluation is determined to be of the following complexity level: LOW   Pain:  No complaint of pain     Activity Tolerance:   Poor, decreased arousal  Please refer to the flowsheet for vital signs taken during this treatment.   After treatment:   [X] Patient left in no apparent distress sitting up in chair  [ ] Patient left in no apparent distress in bed  [X] Call bell left within reach  [X] Nursing notified  [ ] Caregiver present  [X] Bed alarm activated      COMMUNICATION/EDUCATION:   The patients plan of care was discussed with: Physical Therapist and Registered Nurse. [ ] Home safety education was provided and the patient/caregiver indicated understanding. [ ] Patient/family have participated as able in goal setting and plan of care. [ ] Patient/family agree to work toward stated goals and plan of care. [ ] Patient understands intent and goals of therapy, but is neutral about his/her participation. [X] Patient is unable to participate in goal setting and plan of care. This patients plan of care is appropriate for delegation to [facility].      Thank you for this referral.  Jenny Stuart OTR/L  Time Calculation: 24 mins

## 2017-10-02 NOTE — PROGRESS NOTES
Problem: Falls - Risk of  Goal: *Absence of Falls  Document Moises Fall Risk and appropriate interventions in the flowsheet.    Outcome: Progressing Towards Goal  Fall Risk Interventions:  Mobility Interventions: Bed/chair exit alarm           Medication Interventions: Bed/chair exit alarm     Elimination Interventions: Bed/chair exit alarm, Call light in reach     History of Falls Interventions: Bed/chair exit alarm

## 2017-10-02 NOTE — CDMP QUERY
Patient is noted to have a BMI of 49.75 kg/m2 ( 5'2\", 272 lbs ). Please clarify if this patient is:     =>Morbidly obese (BMI >/= 40)  =>Obese (BMI 30 - 39.9)  =>Overweight (BMI 25 - 29.9)  =>Other explanation of clinical findings  =>Unable to determine (no explanation for clinical findings)    Please clarify and document your clinical opinion in the progress notes and discharge summary, including the definitive and or presumptive diagnosis, (suspected or probable), related to the above clinical findings. Please include clinical findings supporting your diagnosis. REFERENCE: Per the [facility] Association, \"Individuals who are overweight, obese, or morbidly obese are at an increased risk for certain medical conditions when compared to persons of normal weight. Therefore, these conditions are always clinically significant and reportable. \"    Osvaldo Art, [facility], [address] [place] [address]  [email or web address]

## 2017-10-02 NOTE — ROUTINE PROCESS
Bedside shift change report given to Nataliya Sainz RN (oncoming nurse) by Fern Sewell RN (offgoing nurse). Report included the following information SBAR and Kardex.

## 2017-10-02 NOTE — CONSULTS
[address] [facility] Leyla PIKE MD  [phone]                         IDENTIFICATION:    Patient Name  Ania Cates   Date of Birth 1960   [facility] [id number]   Medical Record Number  [id number]      Age  56 y.o. PCP Ace Patricia MD   Admit date:  9/30/2017    [id number] [facility] [place] of Service  10/2/2017            HISTORY         REASON FOR CONSULTATION: depression. HISTORY OF PRESENT ILLNESS:    The patient Ania Cates is a 56 y.o.  female with a past psychiatric history of anxiety, depression and ADHD, who presented for the principle diagnosis of Severe sepsis ([facility] [address] Patient reports/evidences the following emotional symptoms: depressed mood, fatigue, anxiety and concern about health problems . The patient's condition has been precipitated by medical issues. She denies SI at present. Has had Two previous psych hospitalizations at [facility] and at [facility]. Hx of one suicide attempt many years ago. She remembers seeing this writer once in the past after Dr. Concha Argueta had retired. Was on Adderall 120 mg per day that the time. Seeing Dr. Dahiana Gunter for psych meds now. She has decreased her adderall to 15 mg tid. Still on Celexa and Effexor. Also on Ativan and Seroquel (for mood and sleep)    Lives with  and reports he is helpful. ALLERGIES:  Allergies   Allergen Reactions    Egg Other (comments)     Abdominal pain      MEDICATIONS PRIOR TO ADMISSION:  Prescriptions Prior to Admission   Medication Sig    cholecalciferol, vitamin D3, (VITAMIN D3) 2,000 unit tab Take 4,000 Units by mouth two (2) days a week. The patient takes 4000 units on Saturday and Sunday    dextroamphetamine-amphetamine (ADDERALL) 15 mg tablet Take 15 mg by mouth three (3) times daily as needed (focus, attention).  LORazepam (ATIVAN) 1 mg tablet Take 1 mg by mouth two (2) times a day.     QUEtiapine (SEROQUEL) 25 mg tablet Take 25-50 mg by mouth nightly as needed (insomnia, anxiety).  [] phenazopyridine (PYRIDIUM) 100 mg tablet Take 1 Tab by mouth three (3) times daily as needed for Pain for up to 3 days. Caution can cause orange staining on clothes from sweating, tears, urine    nitrofurantoin, macrocrystal-monohydrate, (MACROBID) 100 mg capsule Take 1 Cap by mouth two (2) times a day.  citalopram (CELEXA) 20 mg tablet TAKE 1 TABLET BY MOUTH EVERY MORNING    simvastatin (ZOCOR) 10 mg tablet TAKE 1 TABLET BY MOUTH NIGHTLY    cholecalciferol, vitamin D3, (VITAMIN D3) 2,000 unit tab Take 2,000 Units by mouth five (5) days a week. The patient takes 2000 units Monday, Tuesday, Wednesday, Thursday, Friday    venlafaxine-SR [facility] 150 mg capsule TAKE ONE CAPSULE BY MOUTH EVERY DAY    levothyroxine (SYNTHROID) 112 mcg tablet Take 112 mcg by mouth Daily (before breakfast).  dilTIAZem CD (CARDIZEM CD) 180 mg ER capsule Take 1 Cap by mouth daily.       PAST MEDICAL HISTORY:  Active Ambulatory Problems     Diagnosis Date Noted    Depression 2010    Hypercholesteremia 2010    S/P colonoscopy 2012    Abnormal mammogram 2012    Anxiety     Tobacco abuse     Hypothyroid     Psychotic disorder      Resolved Ambulatory Problems     Diagnosis Date Noted    No Resolved Ambulatory Problems     Past Medical History:   Diagnosis Date    Anxiety     Depression 2010    HTN (hypertension)     Hyperlipidemia LDL goal < 130     Hypothyroid     Psychotic disorder     Tobacco abuse       Past Medical History:   Diagnosis Date    Anxiety     Depression 2010    HTN (hypertension)     Hyperlipidemia LDL goal < 130     Hypothyroid     Psychotic disorder     Severe depression and anxiety diagnosed at 35 yo    Tobacco abuse      Past Surgical History:   Procedure Laterality Date    HX APPENDECTOMY      HX DILATION AND CURETTAGE        SOCIAL HISTORY:    Social History     Social History Narrative            2 children        Not employed/filing for disability    Stopped working after children were born 22 yo and 13 yo     Social History   Substance Use Topics    Smoking status: Current Every Day Smoker     Packs/day: 1.00    Smokeless tobacco: Never Used      Comment:  1 ppd/40+ years    Alcohol use No      FAMILY HISTORY:  History reviewed. History reviewed. No pertinent family history. REVIEW of SYSTEMS:   As noted in history of present illness           MENTAL STATUS EXAM & VITALS     Oriented X4. Memory: WNL. Mood: depressed. Affect: Constricted. Normal speech. Denies SI/HI. no delusions. no hallucinations. Thought process logical and goal directed. Concentration limited. Insight/judgement Fair.              VITALS:     Patient Vitals for the past 24 hrs:   Temp Pulse Resp BP SpO2   10/02/17 1535 100.1 °F (37.8 °C) 88 17 155/88 96 %   10/02/17 1502 - 93 - - -   10/02/17 1055 - 84 - 133/73 93 %   10/02/17 0846 - 90 - - -   10/02/17 0740 99 °F (37.2 °C) 91 19 149/84 96 %   10/02/17 0703 - 90 - - -   10/02/17 0511 99.6 °F (37.6 °C) 89 19 131/71 92 %   10/02/17 0053 99.2 °F (37.3 °C) 94 18 138/68 94 %   10/02/17 0029 - 96 - - -   10/01/17 1931 98.8 °F (37.1 °C) 83 18 135/80 95 %              DATA     Labs reviewed    MEDICATIONS       ALL MEDICATIONS  Current Facility-Administered Medications   Medication Dose Route Frequency    cefepime (MAXIPIME) 2 g in 0.9% sodium chloride (MBP/ADV) 100 mL  2 g IntraVENous Q8H    0.9% sodium chloride with KCl 20 mEq/L infusion   IntraVENous CONTINUOUS    nicotine (NICODERM CQ) 21 mg/24 hr patch 1 Patch  1 Patch TransDERmal Q24H    sodium chloride (NS) flush 5-10 mL  5-10 mL IntraVENous PRN    sodium chloride (NS) flush 5-10 mL  5-10 mL IntraVENous Q8H    sodium chloride (NS) flush 5-10 mL  5-10 mL IntraVENous PRN    acetaminophen (TYLENOL) tablet 650 mg  650 mg Oral Q4H PRN    HYDROcodone-acetaminophen (NORCO) 5-325 mg per tablet 1 Tab  1 Tab Oral Q4H PRN    naloxone [facility]) injection 0.4 mg  0.4 mg IntraVENous PRN    ondansetron (ZOFRAN) injection 4 mg  4 mg IntraVENous Q4H PRN    enoxaparin (LOVENOX) injection 40 mg  40 mg SubCUTAneous Q12H    sodium chloride (NS) flush 5-10 mL  5-10 mL IntraVENous PRN    dextroamphetamine-amphetamine (ADDERALL) tablet 15 mg  15 mg Oral TID PRN    LORazepam (ATIVAN) tablet 1 mg  1 mg Oral BID    QUEtiapine (SEROquel) tablet 25 mg  25 mg Oral QHS PRN    citalopram (CELEXA) tablet 20 mg  20 mg Oral DAILY    venlafaxine-SR (EFFEXOR-XR) capsule 150 mg  150 mg Oral DAILY WITH BREAKFAST    levothyroxine (SYNTHROID) tablet 112 mcg  112 mcg Oral ACB    dilTIAZem CD (CARDIZEM CD) capsule 180 mg  180 mg Oral DAILY    simvastatin (ZOCOR) tablet 10 mg  10 mg Oral QHS    albuterol (PROVENTIL VENTOLIN) nebulizer solution 2.5 mg  2.5 mg Nebulization Q4H PRN      SCHEDULED MEDICATIONS  Current Facility-Administered Medications   Medication Dose Route Frequency    cefepime (MAXIPIME) 2 g in 0.9% sodium chloride (MBP/ADV) 100 mL  2 g IntraVENous Q8H    0.9% sodium chloride with KCl 20 mEq/L infusion   IntraVENous CONTINUOUS    nicotine (NICODERM CQ) 21 mg/24 hr patch 1 Patch  1 Patch TransDERmal Q24H    sodium chloride (NS) flush 5-10 mL  5-10 mL IntraVENous Q8H    enoxaparin (LOVENOX) injection 40 mg  40 mg SubCUTAneous Q12H    LORazepam (ATIVAN) tablet 1 mg  1 mg Oral BID    citalopram (CELEXA) tablet 20 mg  20 mg Oral DAILY    venlafaxine-SR (EFFEXOR-XR) capsule 150 mg  150 mg Oral DAILY WITH BREAKFAST    levothyroxine (SYNTHROID) tablet 112 mcg  112 mcg Oral ACB    dilTIAZem CD (CARDIZEM CD) capsule 180 mg  180 mg Oral DAILY    simvastatin (ZOCOR) tablet 10 mg  10 mg Oral QHS                ASSESSMENT & PLAN          Case d/w nursing staff and hx obtained/plan reviewed with pt who gave verbal informed consent for treatment with medications as noted below.  The patient Jaye Sanchez is a 56 y.o.  female who presents at this time for the following psychiatric diagnoses:    MDD recurrent Moderate  MARIAMA  ADHD    Plan:  1. D/C celexa  2. Increase effexor to 225 mg  3. Limit Adderall to 15 mg TID  4. Change seroquel to 50 mg qhs  5. Continue Ativan at current dose. Can be discharged home from psych stand point and can follow up as outpatient with a psychiatrist.    Will sign off. Please call if further input is needed. Thank you for the opportunity to participate in the care of your patient.                         SIGNED:    Abhijit Oconnor MD  10/2/2017

## 2017-10-02 NOTE — PROGRESS NOTES
Carlos Ryan [facility] 79  [address] [facility], [place], [address] [place] [address]  [phone]      Medical Progress Note      NAME: Ivon Vee   :  1960  MRM:  [id number]    Date/Time: 10/2/2017          Subjective:     Chief Complaint:  F/u pyelo    Chart/notes/labs/studies reviewed, patient examined at bedside. No fever. Feeling better overall. Still weak. Some R-sided back pain            Objective:       Vitals:          Last 24hrs VS reviewed since prior progress note. Most recent are:    Visit Vitals    /73 (BP Patient Position: Post activity; Sitting)    Pulse 84    Temp 99 °F (37.2 °C)    Resp 19    Ht 5' 2\" (1.575 m)    Wt 123.4 kg (272 lb)    SpO2 93%    BMI 49.75 kg/m2     SpO2 Readings from Last 6 Encounters:   10/02/17 93%   17 93%   17 95%   17 97%   17 96%   17 96%    O2 Flow Rate (L/min): 2 l/min       Intake/Output Summary (Last 24 hours) at 10/02/17 1422  Last data filed at 10/02/17 0846   Gross per 24 hour   Intake          2058. 33 ml   Output             1700 ml   Net           358.33 ml          Exam:     Physical Exam:    Gen:  Chronically ill-appearing. Disheveled. No acute distress  HEENT:  Sclerae nonicteric, hearing intact to voice, mucous membranes moist  Neck:  Supple, without masses. Resp:  No accessory muscle use, CTAB without wheezes, rales, or rhonchi  Card: RRR, without m/r/g. Trace LE edema. Abd:  +bowel sounds, soft, nondistended. +CVA Tenderness. Neuro: Face symmetric, tongue midline, speech fluent, follows commands appropriately  Psych:  Alert, oriented x 3. Good insight. Depressed.  Tearful      Medications Reviewed: (see below)    Lab Data Reviewed: (see below)    ______________________________________________________________________    Medications:     Current Facility-Administered Medications   Medication Dose Route Frequency    cefepime (MAXIPIME) 2 g in 0.9% sodium chloride (MBP/ADV) 100 mL  2 g IntraVENous Q8H    0.9% sodium chloride with KCl 20 mEq/L infusion   IntraVENous CONTINUOUS    nicotine (NICODERM CQ) 21 mg/24 hr patch 1 Patch  1 Patch TransDERmal Q24H    sodium chloride (NS) flush 5-10 mL  5-10 mL IntraVENous PRN    sodium chloride (NS) flush 5-10 mL  5-10 mL IntraVENous Q8H    sodium chloride (NS) flush 5-10 mL  5-10 mL IntraVENous PRN    acetaminophen (TYLENOL) tablet 650 mg  650 mg Oral Q4H PRN    HYDROcodone-acetaminophen (NORCO) 5-325 mg per tablet 1 Tab  1 Tab Oral Q4H PRN    naloxone (NARCAN) injection 0.4 mg  0.4 mg IntraVENous PRN    ondansetron (ZOFRAN) injection 4 mg  4 mg IntraVENous Q4H PRN    enoxaparin (LOVENOX) injection 40 mg  40 mg SubCUTAneous Q12H    sodium chloride (NS) flush 5-10 mL  5-10 mL IntraVENous PRN    dextroamphetamine-amphetamine (ADDERALL) tablet 15 mg  15 mg Oral TID PRN    LORazepam (ATIVAN) tablet 1 mg  1 mg Oral BID    QUEtiapine (SEROquel) tablet 25 mg  25 mg Oral QHS PRN    citalopram (CELEXA) tablet 20 mg  20 mg Oral DAILY    venlafaxine-SR (EFFEXOR-XR) capsule 150 mg  150 mg Oral DAILY WITH BREAKFAST    levothyroxine (SYNTHROID) tablet 112 mcg  112 mcg Oral ACB    dilTIAZem CD (CARDIZEM CD) capsule 180 mg  180 mg Oral DAILY    simvastatin (ZOCOR) tablet 10 mg  10 mg Oral QHS    albuterol (PROVENTIL VENTOLIN) nebulizer solution 2.5 mg  2.5 mg Nebulization Q4H PRN            Lab Review:     Recent Labs      10/02/17   0451  10/01/17   0052  09/30/17   1622   WBC  7.2  6.7  9.0   HGB  10.7*  10.8*  12.5   HCT  31.7*  32.1*  36.0   PLT  170  152  167     Recent Labs      10/02/17   0451  10/01/17   0052  09/30/17   1622   NA  136  133*  126*   K  3.4*  2.8*  3.1*   CL  102  101  93*   CO2  23  24  23   GLU  83  100  105*   BUN  9  12  11   CREA  1.02  1.17*  1.26*   CA  8.1*  7.6*  8.5   MG  1.9  1.6   --    PHOS  2.5*   --    --    ALB  1.9*   --   2.5*   SGOT  37   --   26   ALT  23   --   23     No components found for: GLPOC  No results for input(s): PH, PCO2, PO2, HCO3, FIO2 in the last 72 hours. No results for input(s): INR in the last 72 hours. No lab exists for component: Piyush Benitez  Lab Results   Component Value Date/Time    Specimen Description: BLOOD 04/20/2009 05:18 PM    Specimen Description: STOOL 04/20/2009 11:20 AM     Lab Results   Component Value Date/Time    Culture result: NO GROWTH AFTER 2 HOURS 10/02/2017 04:51 AM    Culture result: KLEBSIELLA PNEUMONIAE 09/30/2017 04:48 PM    Culture result: NO GROWTH 2 DAYS 09/30/2017 04:22 PM    Culture result:  09/30/2017 04:22 PM     GRAM NEGATIVE RODS GROWING IN 1 OF 2 BOTTLES DRAWN . ..RT. HAND SITE    Culture result:  09/30/2017 04:22 PM     PRELIMINARY RESULT OF GRAM NEGATIVE RODS  GROWING IN 1 OF 2 BOTTLES DRAWN  CALLED TO AND READ BACK BY  VINHRN AT 1231, 10/1/17 DB      Culture result: REMAINING BOTTLE(S) HAS/HAVE NO GROWTH SO FAR 09/30/2017 04:22 PM            Assessment / Plan:       [facility] 2/2 UTI and likely pyelo. Also found to have bacteremia. US showed large kidney stone with hydronephrosis. Improving   -- changed Zosyn to cefepime due to urine culture results obtained on 9/28 at PCP's office which showed resistance to piperacillin  -- follow up on blood culture results, surveillance culture sent  -- cont IV hydration  -- urology consult     Hyperbilirubinemia / elevated alk phos: RUQ US showed no gallstones or biliary ductal dilatation. Elevated bili likely from sepsis  -- follow LFT      Depression (8/19/2010): seems severe. Prior hospitalization at [facility]. Depressed, tearful.  TSH WNR  -- cont Celexa and Effexor and Ativan  -- psych eval      Hypercholesteremia (8/19/2010)  -- cont statin      Hypothyroid (): TSH WNR  -- cont LT4      JING (acute kidney injury) ([facility] (9/30/2017): mild, improving nicely  -- monitor on IVF      Hyponatremia (9/30/2017): resolved  -- monitor on IVF      HTN (hypertension) (9/30/2017): controlled  -- cont diltiazem Hypokalemia:   -- replete K.  Follow Mg      Tobacco abuse:  -- smoking cessation      Total time spent with patient: 35 minutes, d/w pt's mother                 Care Plan discussed with: Patient, Nursing Staff and >50% of time spent in counseling and coordination of care    Discussed:  Care Plan and D/C Planning    Prophylaxis:  Lovenox    Disposition:  Home w/Family           ___________________________________________________    Attending Physician: Vasile Boateng MD

## 2017-10-02 NOTE — PROGRESS NOTES
Problem: Mobility Impaired (Adult and Pediatric)  Goal: *Acute Goals and Plan of Care (Insert Text)  Physical Therapy Goals  Initiated 10/2/2017  1. Patient will move from supine to sit and sit to supine in bed with independence within 7 day(s). 2. Patient will transfer from bed to chair and chair to bed with supervision/set-up using the least restrictive device within 7 day(s). 3. Patient will perform sit to stand with supervision/set-up within 7 day(s). 4. Patient will ambulate with supervision/set-up for 25 feet with the least restrictive device within 7 day(s). 5. Patient will ascend/descend 3 stairs with 1 handrail(s) with minimal assistance/contact guard assist within 7 day(s). PHYSICAL THERAPY EVALUATION  Patient: Jhonny Ball (89 y.o. female)  Date: 10/2/2017  Primary Diagnosis: Severe sepsis [facility])        Precautions:   Fall      ASSESSMENT :  Based on the objective data described below, the patient presents with generalized weakness, decreased bed mobility, decreased functional transfers and ambulation, decreased dynamic balance, and decreased safety awareness following admission for sepsis. Patient was received in bed slow to respond and speech a bit slurred,  but appropriate, lemos in place, and willing to work with PT. Patient was able to get to edge of bed with minimal assist, she then stood and ambulated with rolling walker for 3 feet to recliner with mod assist. Patient needs frequent safety cues and assistance to stay in close range to walker. She lost balance backing up into the recliner needing mod assistance to lower safely into the chair. The patient fatigues easily and would benefit from rehab prior to discharge home. At baseline, she lives in a two story home with her  and uses one crutch to ambulate most of the time. She admits that her activity level is limited, she mostly gets up and sits in the chair and plays with her dogs. She is not safe to return home at this time. Recommend rehab as patient states her home is \"a mess\" and not conducive to having home  PT. She should have assistance at all times with OOB activity and use a rolling walker. Patient will benefit from skilled intervention to address the above impairments. Patients rehabilitation potential is considered to be Fair  Factors which may influence rehabilitation potential include:   [ ]         None noted  [ ]         Mental ability/status  [X]         Medical condition  [X]         Home/family situation and support systems  [X]         Safety awareness  [ ]         Pain tolerance/management  [ ]         Other:        PLAN :  Recommendations and Planned Interventions:  [X]           Bed Mobility Training             [ ]    Neuromuscular Re-Education  [X]           Transfer Training                   [ ]    Orthotic/Prosthetic Training  [X]           Gait Training                         [ ]    Modalities  [X]           Therapeutic Exercises           [ ]    Edema Management/Control  [ ]           Therapeutic Activities            [ ]    Patient and Family Training/Education  [ ]           Other (comment):     Frequency/Duration: Patient will be followed by physical therapy  5 times a week to address goals. Discharge Recommendations: Rehab  Further Equipment Recommendations for Discharge: rolling walker       SUBJECTIVE:   Patient stated It is so hot in here, can you turn the air down.       OBJECTIVE DATA SUMMARY:   HISTORY:    Past Medical History:   Diagnosis Date    Anxiety      Depression 8/19/2010    HTN (hypertension)      Hyperlipidemia LDL goal < 130      Hypothyroid      Psychotic disorder       Severe depression and anxiety diagnosed at 33 yo    Tobacco abuse       Past Surgical History:   Procedure Laterality Date    HX APPENDECTOMY        HX DILATION AND CURETTAGE         Prior Level of Function/Home Situation: used one crutch most of time; limited mobility per patient  Personal factors and/or comorbidities impacting plan of care: prior function limited, decreased safety awareness, decreased balance and strength     Home Situation  Home Environment: Private residence  # Steps to Enter: 3  Rails to Enter: Yes  One/Two Story Residence: Two story  Living Alone: No  Support Systems: Spouse/Significant Other/Partner  Patient Expects to be Discharged to[de-identified] Rehabilitation facility  Current DME Used/Available at Home: Cane, straight, Crutches     EXAMINATION/PRESENTATION/DECISION MAKING:   Critical Behavior:  Neurologic State: Alert, Appropriate for age  Orientation Level: Oriented X4  Cognition: Appropriate safety awareness, Appropriate for age attention/concentration, Appropriate decision making, Follows commands  Safety/Judgement: Decreased awareness of need for safety, Decreased awareness of environment  Hearing: Auditory  Auditory Impairment: None  Skin:  Not fully observed     Range Of Motion:  AROM: Generally decreased, functional                       Strength:    Strength: Generally decreased, functional                    Tone & Sensation:   Tone: Normal              Sensation: Intact               Coordination:  Coordination: Generally decreased, functional        Functional Mobility:  Bed Mobility:     Supine to Sit: Additional time; Moderate assistance        Transfers:  Sit to Stand: Additional time;Minimum assistance  Stand to Sit: Additional time; Moderate assistance        Bed to Chair: Additional time; Moderate assistance              Balance:   Sitting: Intact  Standing: Intact; With support  Ambulation/Gait Training:  Distance (ft): 3 Feet (ft)  Assistive Device: Gait belt;Walker, rolling  Ambulation - Level of Assistance: Moderate assistance        Gait Abnormalities: Decreased step clearance; Path deviations        Base of Support: Widened     Speed/Thu: Pace decreased (<100 feet/min)  Step Length: Right shortened;Left shortened                                               Therapeutic Exercises: Ankle pumps     Functional Measure:  Barthel Index:      Bathin  Bladder: 0  Bowels: 5  Groomin  Dressin  Feedin  Mobility: 0  Stairs: 0  Toilet Use: 0  Transfer (Bed to Chair and Back): 5  Total: 15         Barthel and G-code impairment scale:  Percentage of impairment CH  0% CI  1-19% CJ  20-39% CK  40-59% CL  60-79% CM  80-99% CN  100%   Barthel Score 0-100 100 99-80 79-60 59-40 20-39 1-19    0   Barthel Score 0-20 20 17-19 13-16 9-12 5-8 1-4 0      The Barthel ADL Index: Guidelines  1. The index should be used as a record of what a patient does, not as a record of what a patient could do. 2. The main aim is to establish degree of independence from any help, physical or verbal, however minor and for whatever reason. 3. The need for supervision renders the patient not independent. 4. A patient's performance should be established using the best available evidence. Asking the patient, friends/relatives and nurses are the usual sources, but direct observation and common sense are also important. However direct testing is not needed. 5. Usually the patient's performance over the preceding 24-48 hours is important, but occasionally longer periods will be relevant. 6. Middle categories imply that the patient supplies over 50 per cent of the effort. 7. Use of aids to be independent is allowed. Linda Wiseman., Barthel, D.W. (8423). Functional evaluation: the Barthel Index. [address] [facility] (14)2. ASHLEY Ventura Raynold Cord.[facility], [address] [place], [place] [address]e (). Measuring the change indisability after inpatient rehabilitation; comparison of the responsiveness of the Barthel Index and Functional [place] Measure. Journal of Neurology, Neurosurgery, and Psychiatry, 66(4), 656-148. CARISSA Carson.CAIN, NURY Cantrell, & Juancarlos Moralez MPALMIRA. (2004.) Assessment of post-stroke quality of life in cost-effectiveness studies:  The usefulness of the Barthel Index and the EuroQoL-5D. Quality of Life Research, 15, 785-23            Physical Therapy Evaluation Charge Determination   History Examination Presentation Decision-Making   HIGH Complexity :3+ comorbidities / personal factors will impact the outcome/ POC  MEDIUM Complexity : 3 Standardized tests and measures addressing body structure, function, activity limitation and / or participation in recreation  LOW Complexity : Stable, uncomplicated  LOW Complexity : FOTO score of       Based on the above components, the patient evaluation is determined to be of the following complexity level: LOW      Pain:  Pain Scale 1: Numeric (0 - 10)  Pain Intensity 1: 0        Activity Tolerance:   Fatigues easily. Please refer to the flowsheet for vital signs taken during this treatment. After treatment:   [X]         Patient left in no apparent distress sitting up in chair  [ ]         Patient left in no apparent distress in bed  [X]         Call bell left within reach  [X]         Nursing notified  [ ]         Caregiver present  [X]         Chair alarm activated      COMMUNICATION/EDUCATION:   The patients plan of care was discussed with: Registered Nurse.  [X]         Fall prevention education was provided and the patient/caregiver indicated understanding. [ ]         Patient/family have participated as able in goal setting and plan of care. [X]         Patient/family agree to work toward stated goals and plan of care. [ ]         Patient understands intent and goals of therapy, but is neutral about his/her participation. [ ]         Patient is unable to participate in goal setting and plan of care.      Thank you for this referral.  Abhishek Anderson, PT   Time Calculation: 35 mins

## 2017-10-03 PROCEDURE — 74011250637 HC RX REV CODE- 250/637: Performed by: INTERNAL MEDICINE

## 2017-10-03 PROCEDURE — 74011250636 HC RX REV CODE- 250/636: Performed by: INTERNAL MEDICINE

## 2017-10-03 PROCEDURE — 74011250637 HC RX REV CODE- 250/637: Performed by: PSYCHIATRY & NEUROLOGY

## 2017-10-03 PROCEDURE — 36415 COLL VENOUS BLD VENIPUNCTURE: CPT | Performed by: INTERNAL MEDICINE

## 2017-10-03 PROCEDURE — 74011000258 HC RX REV CODE- 258: Performed by: INTERNAL MEDICINE

## 2017-10-03 PROCEDURE — 83605 ASSAY OF LACTIC ACID: CPT | Performed by: INTERNAL MEDICINE

## 2017-10-03 PROCEDURE — 87040 BLOOD CULTURE FOR BACTERIA: CPT | Performed by: INTERNAL MEDICINE

## 2017-10-03 PROCEDURE — 65660000000 HC RM CCU STEPDOWN

## 2017-10-03 RX ORDER — MORPHINE SULFATE 2 MG/ML
2 INJECTION, SOLUTION INTRAMUSCULAR; INTRAVENOUS
Status: DISCONTINUED | OUTPATIENT
Start: 2017-10-03 | End: 2017-10-08

## 2017-10-03 RX ADMIN — DEXTROAMPHETAMINE SACCHARATE, AMPHETAMINE ASPARTATE, DEXTROAMPHETAMINE SULFATE AND AMPHETAMINE SULFATE 15 MG: 2.5; 2.5; 2.5; 2.5 TABLET ORAL at 15:12

## 2017-10-03 RX ADMIN — CEFEPIME HYDROCHLORIDE 2 G: 2 INJECTION, POWDER, FOR SOLUTION INTRAVENOUS at 11:42

## 2017-10-03 RX ADMIN — LORAZEPAM 1 MG: 1 TABLET ORAL at 21:21

## 2017-10-03 RX ADMIN — VENLAFAXINE HYDROCHLORIDE 225 MG: 150 CAPSULE, EXTENDED RELEASE ORAL at 09:24

## 2017-10-03 RX ADMIN — ACETAMINOPHEN 650 MG: 325 TABLET ORAL at 21:21

## 2017-10-03 RX ADMIN — DEXTROAMPHETAMINE SACCHARATE, AMPHETAMINE ASPARTATE, DEXTROAMPHETAMINE SULFATE AND AMPHETAMINE SULFATE 15 MG: 2.5; 2.5; 2.5; 2.5 TABLET ORAL at 09:25

## 2017-10-03 RX ADMIN — CEFEPIME HYDROCHLORIDE 2 G: 2 INJECTION, POWDER, FOR SOLUTION INTRAVENOUS at 04:54

## 2017-10-03 RX ADMIN — Medication 10 ML: at 21:22

## 2017-10-03 RX ADMIN — ENOXAPARIN SODIUM 40 MG: 40 INJECTION SUBCUTANEOUS at 21:21

## 2017-10-03 RX ADMIN — SIMVASTATIN 10 MG: 5 TABLET, FILM COATED ORAL at 21:21

## 2017-10-03 RX ADMIN — CEFEPIME HYDROCHLORIDE 2 G: 2 INJECTION, POWDER, FOR SOLUTION INTRAVENOUS at 19:39

## 2017-10-03 RX ADMIN — ACETAMINOPHEN 650 MG: 325 TABLET ORAL at 11:43

## 2017-10-03 RX ADMIN — DILTIAZEM HYDROCHLORIDE 180 MG: 180 CAPSULE, COATED, EXTENDED RELEASE ORAL at 09:24

## 2017-10-03 RX ADMIN — ENOXAPARIN SODIUM 40 MG: 40 INJECTION SUBCUTANEOUS at 09:25

## 2017-10-03 RX ADMIN — SODIUM CHLORIDE AND POTASSIUM CHLORIDE: 9; 1.49 INJECTION, SOLUTION INTRAVENOUS at 05:01

## 2017-10-03 RX ADMIN — MORPHINE SULFATE 2 MG: 2 INJECTION, SOLUTION INTRAMUSCULAR; INTRAVENOUS at 23:26

## 2017-10-03 RX ADMIN — HYDROCODONE BITARTRATE AND ACETAMINOPHEN 1 TABLET: 5; 325 TABLET ORAL at 19:36

## 2017-10-03 RX ADMIN — QUETIAPINE FUMARATE 50 MG: 25 TABLET ORAL at 21:21

## 2017-10-03 RX ADMIN — LEVOTHYROXINE SODIUM 112 MCG: 112 TABLET ORAL at 07:09

## 2017-10-03 RX ADMIN — SODIUM CHLORIDE AND POTASSIUM CHLORIDE: 9; 1.49 INJECTION, SOLUTION INTRAVENOUS at 16:40

## 2017-10-03 NOTE — PROGRESS NOTES
PHYSICAL THERAPY:Pt declined PT questioning why PT needs to see her. Reinforced that PT Goal was to increase her strength and mobility obility. PT will follow again later today as time allows.

## 2017-10-03 NOTE — PROGRESS NOTES
Occupational Therapy Note :Pt declined OT earlier stating  \" I am not getting out of this bed until the bed sheets are changed. \" Explained to pt that once she gets up it will be much easier to get the linens changed. She continued to decline. Somewhat tangenital thought processes with how she explains things. Will attempt OT next treatment date.

## 2017-10-03 NOTE — CONSULTS
Urology Consult    Subjective:     Date of Consultation:  October 2, 2017    Referring Physician: Juanjose Mahoney    Reason for Consultation:  Pyelonephritis and possible stone    History of Present Illness:   Patient is a 56 y.o.  female who is being seen for Pyelonephritis and possible stone. She was admitted to the hospital for Severe sepsis ([facility] I have reviewed her admission notes and records. When admitted, she had no significant white count elevation. She was having lower abdominal pain and fever over 101.5. She has a pan-sensitive Klebsiella urinary tract infection and one of what appears to be 4 blood cultures growing gram-negative. She is on multiple antibiotics and feels better since admission. Temperature today 100. She reports she's had problems with recurrent urinary tract infections over the last year. She does not generally get dysuria. She usually gets lower abdominal pain. She has chronic urinary frequency and wears a pad. She notes she can have sudden urge in the middle the night with either sleeping through her void, we will awakening to find herself already voiding. She does wear pads and the day but does not have as much trouble. She denies stress urinary incontinence. She does report that she had urinary tract infections issues as a child and had multiple dilations. She currently has a Irby catheter in place. She reports that she has seen several physicians who told her she has blood in her urine. She was a long-term smoker. She has never seen visible blood in the urine. She also reports abnormal uterine bleeding. She has seen her gynecologist about this in the past but not recently. Gynecologically she has 2 children by natural deliveries. All female organs are intact. A renal ultrasound has been obtained twice now. This shows potentially mild hydronephrosis and a mid renal stone. I have reviewed this. It does not appear to be obstructing.     Past Medical History: Diagnosis Date    Anxiety     Depression 8/19/2010    HTN (hypertension)     Hyperlipidemia LDL goal < 130     Hypothyroid     Psychotic disorder     Severe depression and anxiety diagnosed at 35 yo    Tobacco abuse       Past Surgical History:   Procedure Laterality Date    HX APPENDECTOMY      HX DILATION AND CURETTAGE        History reviewed. No pertinent family history. Social History   Substance Use Topics    Smoking status: Current Every Day Smoker     Packs/day: 1.00    Smokeless tobacco: Never Used      Comment:  1 ppd/40+ years    Alcohol use No     Allergies   Allergen Reactions    Egg Other (comments)     Abdominal pain      Prior to Admission medications    Medication Sig Start Date End Date Taking? Authorizing Provider   cholecalciferol, vitamin D3, (VITAMIN D3) 2,000 unit tab Take 4,000 Units by mouth two (2) days a week. The patient takes 4000 units on Saturday and Sunday   Yes Historical Provider   dextroamphetamine-amphetamine (ADDERALL) 15 mg tablet Take 15 mg by mouth three (3) times daily as needed (focus, attention). Yes Historical Provider   LORazepam (ATIVAN) 1 mg tablet Take 1 mg by mouth two (2) times a day. Yes Historical Provider   QUEtiapine (SEROQUEL) 25 mg tablet Take 25-50 mg by mouth nightly as needed (insomnia, anxiety). Yes Historical Provider   phenazopyridine (PYRIDIUM) 100 mg tablet Take 1 Tab by mouth three (3) times daily as needed for Pain for up to 3 days. Caution can cause orange staining on clothes from sweating, tears, urine 9/28/17 10/1/17 Yes Satish Randhawa MD   nitrofurantoin, macrocrystal-monohydrate, (MACROBID) 100 mg capsule Take 1 Cap by mouth two (2) times a day.  9/28/17  Yes Satish Randhawa MD   citalopram (CELEXA) 20 mg tablet TAKE 1 TABLET BY MOUTH EVERY MORNING 6/14/17  Yes Satish Randhawa MD   simvastatin (ZOCOR) 10 mg tablet TAKE 1 TABLET BY MOUTH NIGHTLY 6/2/17  Yes Satish Randhawa MD cholecalciferol, vitamin D3, (VITAMIN D3) 2,000 unit tab Take 2,000 Units by mouth five (5) days a week. The patient takes 2000 units Monday, Tuesday, Wednesday, Thursday, Friday   Yes Historical Provider   venlafaxine-SR [facility] 150 mg capsule TAKE ONE CAPSULE BY MOUTH EVERY DAY 17  Yes Shaw Lee MD   levothyroxine (SYNTHROID) 112 mcg tablet Take 112 mcg by mouth Daily (before breakfast). Yes Historical Provider   dilTIAZem CD (CARDIZEM CD) 180 mg ER capsule Take 1 Cap by mouth daily. 10/26/16  Yes Shaw Lee MD         Review of Systems:  A comprehensive review of systems was negative except for that written in the HPI. Objective:     Patient Vitals for the past 8 hrs:   BP Temp Pulse Resp SpO2 Weight   10/02/17 1947 167/87 100 °F (37.8 °C) 93 16 93 % -   10/02/17 1535 155/88 100.1 °F (37.8 °C) 88 17 96 % 128.6 kg (283 lb 9.6 oz)   10/02/17 1502 - - 93 - - -     Temp (24hrs), Av.6 °F (37.6 °C), Min:99 °F (37.2 °C), Max:100.1 °F (37.8 °C)      Intake and Output:   10/01 0701 - 10/02 1900  In: 3258.3 [P.O.:1600;  I.V.:1658.3]  Out: 4200 [Urine:4200]    Physical Exam:  EXAMINATION:     Appearance: well-developed NAD   Conjunctiva/Lids: conjunctivae and lids normal   External Ears/Nose: normal no lesions or deformities   Neck: trachea midline   Respiratory Effort: breathing easily   Lower Extremity: Mild LE edema   Abdomen/Flank: soft non-tender without masses; no CVA tenderness   Liver/Spleen: no organomegaly   Hernia: no ventral hernia    Gait/Station: Reports difficulty with weakness and walking   Skin Inspection: warm and dry   Mood/Affect: normal       Assessment:     Principal Problem:    Severe sepsis ([address] (2017)    Active Problems:    Depression (2010)      Overview: Sees Dr. Tracey Menjivar      Suicide attempt in 2011      Hospitalized [facility] 2011 for depression      I would recommend that we not refill any psychoactive medicines      Hypercholesteremia (8/19/2010)      Tobacco abuse ()      Hypothyroid ()      JING (acute kidney injury) ([facility] (9/30/2017)      Hyponatremia (9/30/2017)      HTN (hypertension) (9/30/2017)        Plan:     I discussed the nature of recurrent urinary tract infections, urinary frequency and urge incontinence, as well as microscopic hematuria. She appears to be doing well at this time. Her white count is within normal limits. Her creatinine is normal. On exam she does not have significant abdominal or flank tenderness. With her history of microscopic hematuria previously, long smoking history, and what I suspect is a stone, I've recommended obtaining a CT IVP to evaluate the upper tracts more thoroughly and get drainage films to ensure no sign of obstruction. I agree with going ahead and removing her Irby. If she has an obstructing stone, she understands we may need to consider further intervention during this hospitalization.  Further evaluation also with cystoscopy would likely be warranted as well as microscopic hematuria does continue to persist.    Signed By: Kamini Obrien MD                         October 2, 2017

## 2017-10-03 NOTE — PROGRESS NOTES
Bedside shift change report given to Fang Parikh RN (oncoming nurse) by Nika Fink RN (offgoing nurse). Report included the following information SBAR, Kardex and MAR.

## 2017-10-03 NOTE — PROGRESS NOTES
Bedside shift change report given to Сергей Lopes RN (oncoming nurse) by Saundra Zamorano RN (offgoing nurse). Report included the following information SBAR, Kardex and MAR.

## 2017-10-03 NOTE — PROGRESS NOTES
Progress Note    Patient: Luis Morrow MRN: [id number]  SSN: [id number]    YOB: 1960 Age: 56 y.o. Sex: female   Height: Height: 5' 2\" (157.5 cm) Weight: Weight: 128.6 kg (283 lb 9.6 oz) BMI: Body mass index is 51.87 kg/(m^2). PCP: Ann Marie Payan MD [phone]   Contact:  Primary Emergency Contact: Yamilex Curry, Home Phone: [phone]     Hospital Day: 4 - Admitted 2017  3:50 PM by Vernice Dance, MD  * No surgery found *            Assessment/Plan:     Pyelo with stone. My recommendation is to continue aggressive abx treatment for now then to proceed with R ureteroscopy in the next 72 hours. If ness pus found at that time in the collecting system would place stent. Do not recommend eswl, ? infexted stone. Imaging: N/A   Exam:    ROS:  Denies Chest Pain, SOB         ID: Temp (24hrs), Av.9 °F (37.7 °C), Min:99.3 °F (37.4 °C), Max:100.1 °F (37.8 °C)    2017: WBC 9.0 K/uL (Ref range: 3.6 - 11.0 K/uL)  10/1/2017: WBC 6.7 K/uL (Ref range: 3.6 - 11.0 K/uL)  10/2/2017: WBC 7.2 K/uL (Ref range: 3.6 - 11.0 K/uL)  Current Antimicrobial Therapy (168h ago through future)    Ordered     Start Stop    10/02/17 1133  cefepime (MAXIPIME) 2 g in 0.9% sodium chloride (MBP/ADV) 100 mL  2 g,   IntraVENous,   EVERY 8 HOURS      10/02/17 1200 --        Cultures:    All Micro Results     Procedure Component Value Units Date/Time    CULTURE, BLOOD, PAIRED [[id number]] Collected:  10/02/17 1085    Order Status:  Completed Specimen:  Blood Updated:  10/03/17 1107     Special Requests: NO SPECIAL REQUESTS        Culture result: NO GROWTH 1 DAY       CULTURE, BLOOD [[id number]] Collected:  17 1622    Order Status:  Completed Specimen:  Blood from Blood Updated:  10/03/17 1106     Special Requests: NO SPECIAL REQUESTS        Culture result: NO GROWTH 3 DAYS       CULTURE, BLOOD [[id number]]  (Abnormal)  (Susceptibility) Collected:  17 0680 Order Status:  Completed Specimen:  Blood from Blood Updated:  10/03/17 0919     Special Requests: NO SPECIAL REQUESTS        Culture result:         KLEBSIELLA PNEUMONIAE GROWING IN 1 OF 2 BOTTLES DRAWN . ..(SITE= R HAND) (A)      PRELIMINARY RESULT OF GRAM NEGATIVE RODS  GROWING IN 1 OF 2 BOTTLES DRAWN  CALLED TO AND READ BACK BY  MIL JUSTICE AT 1231, 10/1/17 DB                REMAINING BOTTLE(S) HAS/HAVE NO GROWTH SO FAR    CULTURE, URINE [[id number]]  (Abnormal)  (Susceptibility) Collected:  09/30/17 1648    Order Status:  Completed Specimen:  Urine Updated:  10/02/17 1141     Special Requests: --        NO SPECIAL REQUESTS  Reflexed from [id number]       [place] Count --        12638  COLONIES/mL       Culture result: KLEBSIELLA PNEUMONIAE (A)            Pulm: Room air 2 l/min 91 %     IS:   of predicted         GI: Intake: DIET REGULAR   Appetite: Good % Diet Eaten: 700 %        Abdominal Assessment: Obese, Soft  Bowel Sounds: Active     No data found. Pain: 0/10 Constant - Generalized - Anterior         Current Analgesic Therapy (168h ago through future)    Ordered     Start Stop    09/30/17 1731  acetaminophen (TYLENOL) tablet 650 mg  650 mg,   Oral,   EVERY 4 HOURS AS NEEDED      09/30/17 1731 --    09/30/17 1731  HYDROcodone-acetaminophen (NORCO) 5-325 mg per tablet 1 Tab  1 Tab,   Oral,   EVERY 4 HOURS AS NEEDED      09/30/17 1731 --         :    External catheter;Voiding -        9/30/2017: Creatinine 1.26 MG/DL* (Ref range: 0.55 - 1.02 MG/DL)  10/1/2017: Creatinine 1.17 MG/DL* (Ref range: 0.55 - 1.02 MG/DL)  10/2/2017: Creatinine 1.02 MG/DL (Ref range: 0.55 - 1.02 MG/DL)  No results for input(s): FCREA in the last 336 hours.    Lines:                  Vitals: O2 Device: Room air O2 Flow Rate (L/min): 2 l/min  Patient Vitals for the past 24 hrs:   BP Temp Pulse Resp SpO2 Weight   10/03/17 1121 160/86 99.9 °F (37.7 °C) 87 20 91 % -   10/03/17 1046 - - - - 93 % -   10/03/17 0805 164/74 99.9 °F (37.7 °C) 88 18 92 % -   10/03/17 0702 - - 90 - - -   10/03/17 0455 135/63 99.3 °F (37.4 °C) 90 16 92 % -   10/03/17 0122 129/67 100 °F (37.8 °C) 87 16 92 % -   10/02/17 1947 167/87 100 °F (37.8 °C) 93 16 93 % -   10/02/17 1535 155/88 100.1 °F (37.8 °C) 88 17 96 % 128.6 kg (283 lb 9.6 oz)   10/02/17 1502 - - 93 - - -      I&O's:    Date 10/02/17 0700 - 10/03/17 0659 10/03/17 0700 - 10/04/17 0659   Shift 4918-2466 1795-8523 24 Hour Total 0939-5844 7702-3763 24 Hour Total   I  N  T  A  K  E   P. O. 750  750         P. O. 750  750       I.V.  (mL/kg/hr) 200  (0.1)  200  (0.1) 4748. 3  4748. 3      Volume (0.9% sodium chloride with KCl 20 mEq/L infusion)    4348. 3  4348. 3      Volume (piperacillin-tazobactam (ZOSYN) 3.375 g in 0.9% sodium chloride (MBP/ADV) 100 mL) 200  200         Volume (cefepime (MAXIPIME) 2 g in 0.9% sodium chloride (MBP/ADV) 100 mL)    400  400    Shift Total  (mL/kg) 950  (7.4)  950  (7.4) 4748.3  (36.9)  4748.3  (36.9)   O  U  T  P  U  T   Urine  (mL/kg/hr) 2000  (1.3) 800  (0.5) 2800  (0.9) 1650  1650      Urine Occurrence(s)  1 x 1 x         Urine Output (mL) ([REMOVED] Urinary Catheter 09/30/17 [place]) 2000 2000         Urine Output (mL) (External Female Catheter 10/03/17)    1650    Shift Total  (mL/kg) 2000  (15.5) 800  (6.2) 2800  (21.8) 1650  (12.8)  1650  (12.8)   NET -0795 -154 -2013 3098. 3  3098. 3   Weight (kg) 128.6 128.6 128.6 128.6 128.6 128.6       Meds:    Current Facility-Administered Medications:     cefepime (MAXIPIME) 2 g in 0.9% sodium chloride (MBP/ADV) 100 mL, 2 g, IntraVENous, Q8H, Garrett Howell MD, Last Rate: 200 mL/hr at 10/03/17 1142, 2 g at 10/03/17 1142    venlafaxine-SR (EFFEXOR-XR) capsule 225 mg, 225 mg, Oral, DAILY WITH BREAKFAST, Graciela Conroy MD, 225 mg at 10/03/17 0924    dextroamphetamine-amphetamine (ADDERALL) tablet 15 mg, 15 mg, Oral, BID, Graciela Conroy MD, 15 mg at 10/03/17 0925    QUEtiapine (SEROquel) tablet 50 mg, 50 mg, Oral, QHS, Graciela Conroy MD, 50 mg at 10/02/17 2313    0.9% sodium chloride with KCl 20 mEq/L infusion, , IntraVENous, CONTINUOUS, Jose GARG Do, MD, Last Rate: 100 mL/hr at 10/03/17 0501    nicotine (NICODERM CQ) 21 mg/24 hr patch 1 Patch, 1 Patch, TransDERmal, Q24H, Michael Melgar MD, 1 Patch at 10/02/17 1744    sodium chloride (NS) flush 5-10 mL, 5-10 mL, IntraVENous, PRN, Elva Urias MD    sodium chloride (NS) flush 5-10 mL, 5-10 mL, IntraVENous, Q8H, Savana Breaux MD, 10 mL at 10/02/17 1400    sodium chloride (NS) flush 5-10 mL, 5-10 mL, IntraVENous, PRN, Savana Breaux MD    acetaminophen (TYLENOL) tablet 650 mg, 650 mg, Oral, Q4H PRN, Savana Breaux MD, 650 mg at 10/03/17 1143    HYDROcodone-acetaminophen (NORCO) 5-325 mg per tablet 1 Tab, 1 Tab, Oral, Q4H PRN, Savana Breaux MD, 1 Tab at 10/02/17 1749    naloxone [facility]) injection 0.4 mg, 0.4 mg, IntraVENous, PRN, Savana Breaux MD    ondansetron [facility]) injection 4 mg, 4 mg, IntraVENous, Q4H PRN, Savana Breaux MD    enoxaparin (LOVENOX) injection 40 mg, 40 mg, SubCUTAneous, Q12H, Savana Breaux MD, 40 mg at 10/03/17 0925    sodium chloride (NS) flush 5-10 mL, 5-10 mL, IntraVENous, PRN, Savana Breaux MD    LORazepam (ATIVAN) tablet 1 mg, 1 mg, Oral, BID, Laura Myers MD, 1 mg at 10/02/17 2013    levothyroxine (SYNTHROID) tablet 112 mcg, 112 mcg, Oral, ACB, Jose GARG Do, MD, 112 mcg at 10/03/17 0709    dilTIAZem CD (CARDIZEM CD) capsule 180 mg, 180 mg, Oral, DAILY, Jose GARG Do, MD, 180 mg at 10/03/17 0924    simvastatin (ZOCOR) tablet 10 mg, 10 mg, Oral, QHS, Jose GARG Do, MD, 10 mg at 10/02/17 2313    albuterol (PROVENTIL VENTOLIN) nebulizer solution 2.5 mg, 2.5 mg, Nebulization, Q4H PRN, Jose GARG Do, MD, 2.5 mg at 10/01/17 0946   Labs: Recent Labs      10/02/17   0451  10/01/17   0052  09/30/17   1622   WBC  7.2  6.7  9.0   HGB  10.7*  10.8*  12.5   HCT  31.7*  32.1*  36.0   PLT  170  152  167     Recent Labs      10/02/17   0451 10/01/17   0052  09/30/17   1622   NA  136  133*  126*   K  3.4*  2.8*  3.1*   CL  102  101  93*   CO2  23  24  23   GLU  83  100  105*   BUN  9  12  11   CREA  1.02  1.17*  1.26*   CA  8.1*  7.6*  8.5   MG  1.9  1.6   --    PHOS  2.5*   --    --              Signed By: Audrey Kim MD - October 3, 2017

## 2017-10-03 NOTE — PROGRESS NOTES
Carlos Ryan [facility]  [address] [facility], [place], [address] [place] [address]  [phone]      Medical Progress Note      NAME: Carolyn Valdovinos   :  1960  MRM:  [id number]    Date/Time: 10/3/2017          Subjective:     Chief Complaint:  F/u pyelo    Sweaty today, feels feverish. Denies abdominal pain or back pain            Objective:       Vitals:          Last 24hrs VS reviewed since prior progress note. Most recent are:    Visit Vitals    /86 (BP 1 Location: Left arm, BP Patient Position: At rest)    Pulse 83    Temp 99.9 °F (37.7 °C)    Resp 20    Ht 5' 2\" (1.575 m)    Wt 128.6 kg (283 lb 9.6 oz)    SpO2 91%    BMI 51.87 kg/m2     SpO2 Readings from Last 6 Encounters:   10/03/17 91%   17 93%   17 95%   17 97%   17 96%   17 96%    O2 Flow Rate (L/min): 2 l/min       Intake/Output Summary (Last 24 hours) at 10/03/17 1538  Last data filed at 10/03/17 1313   Gross per 24 hour   Intake          4748.33 ml   Output             4450 ml   Net           298.33 ml          Exam:     Physical Exam:    Gen:  Chronically ill-appearing. Disheveled. No acute distress  HEENT:  Sclerae nonicteric, hearing intact to voice, mucous membranes moist  Neck:  Supple, without masses. Resp:  No accessory muscle use, CTAB without wheezes, rales, or rhonchi  Card: RRR, without m/r/g. Trace LE edema. Abd:  +bowel sounds, soft, nondistended. +CVA Tenderness. Neuro: Face symmetric, tongue midline, speech fluent, follows commands appropriately  Psych:  Alert, oriented x 3. Good insight. Depressed.  Tearful      Medications Reviewed: (see below)    Lab Data Reviewed: (see below)    ______________________________________________________________________    Medications:     Current Facility-Administered Medications   Medication Dose Route Frequency    cefepime (MAXIPIME) 2 g in 0.9% sodium chloride (MBP/ADV) 100 mL  2 g IntraVENous Q8H    venlafaxine-SR (EFFEXOR-XR) capsule 225 mg  225 mg Oral DAILY WITH BREAKFAST    dextroamphetamine-amphetamine (ADDERALL) tablet 15 mg  15 mg Oral BID    QUEtiapine (SEROquel) tablet 50 mg  50 mg Oral QHS    0.9% sodium chloride with KCl 20 mEq/L infusion   IntraVENous CONTINUOUS    nicotine (NICODERM CQ) 21 mg/24 hr patch 1 Patch  1 Patch TransDERmal Q24H    sodium chloride (NS) flush 5-10 mL  5-10 mL IntraVENous PRN    sodium chloride (NS) flush 5-10 mL  5-10 mL IntraVENous Q8H    sodium chloride (NS) flush 5-10 mL  5-10 mL IntraVENous PRN    acetaminophen (TYLENOL) tablet 650 mg  650 mg Oral Q4H PRN    HYDROcodone-acetaminophen (NORCO) 5-325 mg per tablet 1 Tab  1 Tab Oral Q4H PRN    naloxone (NARCAN) injection 0.4 mg  0.4 mg IntraVENous PRN    ondansetron (ZOFRAN) injection 4 mg  4 mg IntraVENous Q4H PRN    enoxaparin (LOVENOX) injection 40 mg  40 mg SubCUTAneous Q12H    sodium chloride (NS) flush 5-10 mL  5-10 mL IntraVENous PRN    LORazepam (ATIVAN) tablet 1 mg  1 mg Oral BID    levothyroxine (SYNTHROID) tablet 112 mcg  112 mcg Oral ACB    dilTIAZem CD (CARDIZEM CD) capsule 180 mg  180 mg Oral DAILY    simvastatin (ZOCOR) tablet 10 mg  10 mg Oral QHS    albuterol (PROVENTIL VENTOLIN) nebulizer solution 2.5 mg  2.5 mg Nebulization Q4H PRN            Lab Review:     Recent Labs      10/02/17   0451  10/01/17   0052  09/30/17   1622   WBC  7.2  6.7  9.0   HGB  10.7*  10.8*  12.5   HCT  31.7*  32.1*  36.0   PLT  170  152  167     Recent Labs      10/02/17   0451  10/01/17   0052  09/30/17   1622   NA  136  133*  126*   K  3.4*  2.8*  3.1*   CL  102  101  93*   CO2  23  24  23   GLU  83  100  105*   BUN  9  12  11   CREA  1.02  1.17*  1.26*   CA  8.1*  7.6*  8.5   MG  1.9  1.6   --    PHOS  2.5*   --    --    ALB  1.9*   --   2.5*   SGOT  37   --   26   ALT  23   --   23     No components found for: GLPOC  No results for input(s): PH, PCO2, PO2, HCO3, FIO2 in the last 72 hours. No results for input(s): INR in the last 72 hours.     No lab exists for component: Melecioermelinda Alin  Lab Results   Component Value Date/Time    Specimen Description: BLOOD 04/20/2009 05:18 PM    Specimen Description: STOOL 04/20/2009 11:20 AM     Lab Results   Component Value Date/Time    Culture result: NO GROWTH 1 DAY 10/02/2017 04:51 AM    Culture result: KLEBSIELLA PNEUMONIAE 09/30/2017 04:48 PM    Culture result: NO GROWTH 3 DAYS 09/30/2017 04:22 PM    Culture result:  09/30/2017 04:22 PM     KLEBSIELLA PNEUMONIAE GROWING IN 1 OF 2 BOTTLES DRAWN . ..(SITE= R HAND)    Culture result:  09/30/2017 04:22 PM     PRELIMINARY RESULT OF GRAM NEGATIVE RODS  GROWING IN 1 OF 2 BOTTLES DRAWN  CALLED TO AND READ BACK BY  VINHRN AT 1231, 10/1/17 DB      Culture result: REMAINING BOTTLE(S) HAS/HAVE NO GROWTH SO FAR 09/30/2017 04:22 PM            Assessment / Plan:       Severe sepsis/ pyelo / bacteremia: 2/2 UTI and likely pyelo. Also found to have bacteremia. US showed large kidney stone with hydronephrosis. Continue abx. Urology following     Hyperbilirubinemia / elevated alk phos: RUQ US showed no gallstones or biliary ductal dilatation. Elevated bili likely from sepsis      Depression (8/19/2010): seems severe. Prior hospitalization at [facility]. Depressed, tearful. TSH WNR. cont Celexa and Effexor and Ativan. Evaluated by psych, meds adjusted ok for discharge from their standpoint      Hypercholesteremia: cont statin      Hypothyroid (): TSH WNR. cont LT4      JING (acute kidney injury): mild, improving nicely      Hyponatremia: resolved. monitor on IVF      HTN (hypertension): controlled. cont diltiazem      Hypokalemia: replete K.  Follow Mg      Tobacco abuse: smoking cessation advised      Total time spent with patient: 30 minutes                 Care Plan discussed with: Patient, Nursing Staff and >50% of time spent in counseling and coordination of care    Discussed:  Care Plan and D/C Planning    Prophylaxis:  Lovenox    Disposition:  Home w/Family           ___________________________________________________    Attending Physician: Crayton Kussmaul, MD

## 2017-10-04 ENCOUNTER — APPOINTMENT (OUTPATIENT)
Dept: GENERAL RADIOLOGY | Age: 57
DRG: 872 | End: 2017-10-04
Attending: UROLOGY
Payer: COMMERCIAL

## 2017-10-04 ENCOUNTER — ANESTHESIA (OUTPATIENT)
Dept: SURGERY | Age: 57
DRG: 872 | End: 2017-10-04
Payer: COMMERCIAL

## 2017-10-04 ENCOUNTER — ANESTHESIA EVENT (OUTPATIENT)
Dept: SURGERY | Age: 57
DRG: 872 | End: 2017-10-04
Payer: COMMERCIAL

## 2017-10-04 LAB
ANION GAP SERPL CALC-SCNC: 5 MMOL/L (ref 5–15)
BUN SERPL-MCNC: 10 MG/DL (ref 6–20)
BUN/CREAT SERPL: 10 (ref 12–20)
CALCIUM SERPL-MCNC: 8.5 MG/DL (ref 8.5–10.1)
CHLORIDE SERPL-SCNC: 105 MMOL/L (ref 97–108)
CO2 SERPL-SCNC: 30 MMOL/L (ref 21–32)
CREAT SERPL-MCNC: 0.99 MG/DL (ref 0.55–1.02)
ERYTHROCYTE [DISTWIDTH] IN BLOOD BY AUTOMATED COUNT: 14.5 % (ref 11.5–14.5)
GLUCOSE SERPL-MCNC: 91 MG/DL (ref 65–100)
HCT VFR BLD AUTO: 33.7 % (ref 35–47)
HGB BLD-MCNC: 11.1 G/DL (ref 11.5–16)
LACTATE SERPL-SCNC: 0.8 MMOL/L (ref 0.4–2)
MCH RBC QN AUTO: 29.6 PG (ref 26–34)
MCHC RBC AUTO-ENTMCNC: 32.9 G/DL (ref 30–36.5)
MCV RBC AUTO: 89.9 FL (ref 80–99)
PLATELET # BLD AUTO: 248 K/UL (ref 150–400)
POTASSIUM SERPL-SCNC: 3.5 MMOL/L (ref 3.5–5.1)
RBC # BLD AUTO: 3.75 M/UL (ref 3.8–5.2)
SODIUM SERPL-SCNC: 140 MMOL/L (ref 136–145)
WBC # BLD AUTO: 6.6 K/UL (ref 3.6–11)

## 2017-10-04 PROCEDURE — 74011250636 HC RX REV CODE- 250/636: Performed by: ANESTHESIOLOGY

## 2017-10-04 PROCEDURE — 77030019908 HC STETH ESOPH SIMS -A: Performed by: ANESTHESIOLOGY

## 2017-10-04 PROCEDURE — 74011250636 HC RX REV CODE- 250/636

## 2017-10-04 PROCEDURE — 74011250636 HC RX REV CODE- 250/636: Performed by: INTERNAL MEDICINE

## 2017-10-04 PROCEDURE — C2617 STENT, NON-COR, TEM W/O DEL: HCPCS | Performed by: UROLOGY

## 2017-10-04 PROCEDURE — 77030020782 HC GWN BAIR PAWS FLX 3M -B

## 2017-10-04 PROCEDURE — 80048 BASIC METABOLIC PNL TOTAL CA: CPT | Performed by: INTERNAL MEDICINE

## 2017-10-04 PROCEDURE — 74011250637 HC RX REV CODE- 250/637: Performed by: PSYCHIATRY & NEUROLOGY

## 2017-10-04 PROCEDURE — 74011636320 HC RX REV CODE- 636/320: Performed by: UROLOGY

## 2017-10-04 PROCEDURE — 74011000250 HC RX REV CODE- 250

## 2017-10-04 PROCEDURE — 77030018832 HC SOL IRR H20 ICUM -A: Performed by: UROLOGY

## 2017-10-04 PROCEDURE — C1769 GUIDE WIRE: HCPCS | Performed by: UROLOGY

## 2017-10-04 PROCEDURE — BT1D1ZZ FLUOROSCOPY OF RIGHT KIDNEY, URETER AND BLADDER USING LOW OSMOLAR CONTRAST: ICD-10-PCS | Performed by: UROLOGY

## 2017-10-04 PROCEDURE — 77030008684 HC TU ET CUF COVD -B: Performed by: ANESTHESIOLOGY

## 2017-10-04 PROCEDURE — 77030019927 HC TBNG IRR CYSTO BAXT -A: Performed by: UROLOGY

## 2017-10-04 PROCEDURE — 74011250637 HC RX REV CODE- 250/637: Performed by: INTERNAL MEDICINE

## 2017-10-04 PROCEDURE — 76010000138 HC OR TIME 0.5 TO 1 HR: Performed by: UROLOGY

## 2017-10-04 PROCEDURE — 65660000000 HC RM CCU STEPDOWN

## 2017-10-04 PROCEDURE — 77030013079 HC BLNKT BAIR HGGR 3M -A: Performed by: ANESTHESIOLOGY

## 2017-10-04 PROCEDURE — 76000 FLUOROSCOPY <1 HR PHYS/QHP: CPT

## 2017-10-04 PROCEDURE — 74011000258 HC RX REV CODE- 258: Performed by: INTERNAL MEDICINE

## 2017-10-04 PROCEDURE — 85027 COMPLETE CBC AUTOMATED: CPT | Performed by: INTERNAL MEDICINE

## 2017-10-04 PROCEDURE — 77030026438 HC STYL ET INTUB CARD -A: Performed by: ANESTHESIOLOGY

## 2017-10-04 PROCEDURE — 0T768DZ DILATION OF RIGHT URETER WITH INTRALUMINAL DEVICE, VIA NATURAL OR ARTIFICIAL OPENING ENDOSCOPIC: ICD-10-PCS | Performed by: UROLOGY

## 2017-10-04 PROCEDURE — 36415 COLL VENOUS BLD VENIPUNCTURE: CPT | Performed by: INTERNAL MEDICINE

## 2017-10-04 PROCEDURE — C1758 CATHETER, URETERAL: HCPCS | Performed by: UROLOGY

## 2017-10-04 PROCEDURE — 76060000032 HC ANESTHESIA 0.5 TO 1 HR: Performed by: UROLOGY

## 2017-10-04 PROCEDURE — 76210000006 HC OR PH I REC 0.5 TO 1 HR: Performed by: UROLOGY

## 2017-10-04 DEVICE — URETERAL STENT
Type: IMPLANTABLE DEVICE | Site: URETER | Status: FUNCTIONAL
Brand: CONTOUR™

## 2017-10-04 RX ORDER — DEXAMETHASONE SODIUM PHOSPHATE 4 MG/ML
INJECTION, SOLUTION INTRA-ARTICULAR; INTRALESIONAL; INTRAMUSCULAR; INTRAVENOUS; SOFT TISSUE AS NEEDED
Status: DISCONTINUED | OUTPATIENT
Start: 2017-10-04 | End: 2017-10-04 | Stop reason: HOSPADM

## 2017-10-04 RX ORDER — LIDOCAINE HYDROCHLORIDE 20 MG/ML
INJECTION, SOLUTION EPIDURAL; INFILTRATION; INTRACAUDAL; PERINEURAL AS NEEDED
Status: DISCONTINUED | OUTPATIENT
Start: 2017-10-04 | End: 2017-10-04 | Stop reason: HOSPADM

## 2017-10-04 RX ORDER — SODIUM CHLORIDE 0.9 % (FLUSH) 0.9 %
5-10 SYRINGE (ML) INJECTION AS NEEDED
Status: DISCONTINUED | OUTPATIENT
Start: 2017-10-04 | End: 2017-10-07

## 2017-10-04 RX ORDER — SODIUM CHLORIDE 0.9 % (FLUSH) 0.9 %
5-10 SYRINGE (ML) INJECTION EVERY 8 HOURS
Status: DISCONTINUED | OUTPATIENT
Start: 2017-10-04 | End: 2017-10-07

## 2017-10-04 RX ORDER — SUCCINYLCHOLINE CHLORIDE 20 MG/ML
INJECTION INTRAMUSCULAR; INTRAVENOUS AS NEEDED
Status: DISCONTINUED | OUTPATIENT
Start: 2017-10-04 | End: 2017-10-04 | Stop reason: HOSPADM

## 2017-10-04 RX ORDER — SODIUM CHLORIDE, SODIUM LACTATE, POTASSIUM CHLORIDE, CALCIUM CHLORIDE 600; 310; 30; 20 MG/100ML; MG/100ML; MG/100ML; MG/100ML
100 INJECTION, SOLUTION INTRAVENOUS CONTINUOUS
Status: DISCONTINUED | OUTPATIENT
Start: 2017-10-04 | End: 2017-10-07

## 2017-10-04 RX ORDER — ONDANSETRON 2 MG/ML
INJECTION INTRAMUSCULAR; INTRAVENOUS AS NEEDED
Status: DISCONTINUED | OUTPATIENT
Start: 2017-10-04 | End: 2017-10-04 | Stop reason: HOSPADM

## 2017-10-04 RX ORDER — ROCURONIUM BROMIDE 10 MG/ML
INJECTION, SOLUTION INTRAVENOUS AS NEEDED
Status: DISCONTINUED | OUTPATIENT
Start: 2017-10-04 | End: 2017-10-04 | Stop reason: HOSPADM

## 2017-10-04 RX ORDER — SODIUM CHLORIDE 0.9 % (FLUSH) 0.9 %
5-10 SYRINGE (ML) INJECTION EVERY 8 HOURS
Status: DISCONTINUED | OUTPATIENT
Start: 2017-10-04 | End: 2017-10-10 | Stop reason: HOSPADM

## 2017-10-04 RX ORDER — HYDROMORPHONE HYDROCHLORIDE 1 MG/ML
.25-1 INJECTION, SOLUTION INTRAMUSCULAR; INTRAVENOUS; SUBCUTANEOUS
Status: DISCONTINUED | OUTPATIENT
Start: 2017-10-04 | End: 2017-10-04 | Stop reason: HOSPADM

## 2017-10-04 RX ORDER — SODIUM CHLORIDE 0.9 % (FLUSH) 0.9 %
5-10 SYRINGE (ML) INJECTION AS NEEDED
Status: DISCONTINUED | OUTPATIENT
Start: 2017-10-04 | End: 2017-10-04 | Stop reason: HOSPADM

## 2017-10-04 RX ORDER — PROPOFOL 10 MG/ML
INJECTION, EMULSION INTRAVENOUS AS NEEDED
Status: DISCONTINUED | OUTPATIENT
Start: 2017-10-04 | End: 2017-10-04 | Stop reason: HOSPADM

## 2017-10-04 RX ORDER — SODIUM CHLORIDE 0.9 % (FLUSH) 0.9 %
5-10 SYRINGE (ML) INJECTION AS NEEDED
Status: DISCONTINUED | OUTPATIENT
Start: 2017-10-04 | End: 2017-10-10 | Stop reason: HOSPADM

## 2017-10-04 RX ORDER — SODIUM CHLORIDE, SODIUM LACTATE, POTASSIUM CHLORIDE, CALCIUM CHLORIDE 600; 310; 30; 20 MG/100ML; MG/100ML; MG/100ML; MG/100ML
100 INJECTION, SOLUTION INTRAVENOUS CONTINUOUS
Status: DISCONTINUED | OUTPATIENT
Start: 2017-10-04 | End: 2017-10-04 | Stop reason: HOSPADM

## 2017-10-04 RX ORDER — LIDOCAINE HYDROCHLORIDE 10 MG/ML
0.1 INJECTION, SOLUTION EPIDURAL; INFILTRATION; INTRACAUDAL; PERINEURAL AS NEEDED
Status: DISCONTINUED | OUTPATIENT
Start: 2017-10-04 | End: 2017-10-10 | Stop reason: HOSPADM

## 2017-10-04 RX ORDER — MIDAZOLAM HYDROCHLORIDE 1 MG/ML
INJECTION, SOLUTION INTRAMUSCULAR; INTRAVENOUS AS NEEDED
Status: DISCONTINUED | OUTPATIENT
Start: 2017-10-04 | End: 2017-10-04 | Stop reason: HOSPADM

## 2017-10-04 RX ORDER — FENTANYL CITRATE 50 UG/ML
INJECTION, SOLUTION INTRAMUSCULAR; INTRAVENOUS AS NEEDED
Status: DISCONTINUED | OUTPATIENT
Start: 2017-10-04 | End: 2017-10-04 | Stop reason: HOSPADM

## 2017-10-04 RX ADMIN — LORAZEPAM 1 MG: 1 TABLET ORAL at 21:17

## 2017-10-04 RX ADMIN — SUCCINYLCHOLINE CHLORIDE 120 MG: 20 INJECTION INTRAMUSCULAR; INTRAVENOUS at 16:19

## 2017-10-04 RX ADMIN — Medication 10 ML: at 21:18

## 2017-10-04 RX ADMIN — QUETIAPINE FUMARATE 50 MG: 25 TABLET ORAL at 21:17

## 2017-10-04 RX ADMIN — ROCURONIUM BROMIDE 5 MG: 10 INJECTION, SOLUTION INTRAVENOUS at 16:18

## 2017-10-04 RX ADMIN — FENTANYL CITRATE 50 MCG: 50 INJECTION, SOLUTION INTRAMUSCULAR; INTRAVENOUS at 16:07

## 2017-10-04 RX ADMIN — DEXTROAMPHETAMINE SACCHARATE, AMPHETAMINE ASPARTATE, DEXTROAMPHETAMINE SULFATE AND AMPHETAMINE SULFATE 15 MG: 2.5; 2.5; 2.5; 2.5 TABLET ORAL at 10:03

## 2017-10-04 RX ADMIN — CEFEPIME HYDROCHLORIDE 2 G: 2 INJECTION, POWDER, FOR SOLUTION INTRAVENOUS at 13:47

## 2017-10-04 RX ADMIN — ENOXAPARIN SODIUM 40 MG: 40 INJECTION SUBCUTANEOUS at 21:17

## 2017-10-04 RX ADMIN — ONDANSETRON 4 MG: 2 INJECTION INTRAMUSCULAR; INTRAVENOUS at 16:29

## 2017-10-04 RX ADMIN — DEXTROAMPHETAMINE SACCHARATE, AMPHETAMINE ASPARTATE, DEXTROAMPHETAMINE SULFATE AND AMPHETAMINE SULFATE 15 MG: 2.5; 2.5; 2.5; 2.5 TABLET ORAL at 18:52

## 2017-10-04 RX ADMIN — LEVOTHYROXINE SODIUM 112 MCG: 112 TABLET ORAL at 06:26

## 2017-10-04 RX ADMIN — DEXAMETHASONE SODIUM PHOSPHATE 4 MG: 4 INJECTION, SOLUTION INTRA-ARTICULAR; INTRALESIONAL; INTRAMUSCULAR; INTRAVENOUS; SOFT TISSUE at 16:29

## 2017-10-04 RX ADMIN — VENLAFAXINE HYDROCHLORIDE 225 MG: 150 CAPSULE, EXTENDED RELEASE ORAL at 10:03

## 2017-10-04 RX ADMIN — FENTANYL CITRATE 100 MCG: 50 INJECTION, SOLUTION INTRAMUSCULAR; INTRAVENOUS at 16:18

## 2017-10-04 RX ADMIN — MORPHINE SULFATE 2 MG: 2 INJECTION, SOLUTION INTRAMUSCULAR; INTRAVENOUS at 21:17

## 2017-10-04 RX ADMIN — LIDOCAINE HYDROCHLORIDE 100 MG: 20 INJECTION, SOLUTION EPIDURAL; INFILTRATION; INTRACAUDAL; PERINEURAL at 16:17

## 2017-10-04 RX ADMIN — Medication 10 ML: at 06:26

## 2017-10-04 RX ADMIN — LORAZEPAM 1 MG: 1 TABLET ORAL at 13:15

## 2017-10-04 RX ADMIN — DILTIAZEM HYDROCHLORIDE 180 MG: 180 CAPSULE, COATED, EXTENDED RELEASE ORAL at 10:04

## 2017-10-04 RX ADMIN — PROPOFOL 200 MG: 10 INJECTION, EMULSION INTRAVENOUS at 16:19

## 2017-10-04 RX ADMIN — CEFEPIME HYDROCHLORIDE 2 G: 2 INJECTION, POWDER, FOR SOLUTION INTRAVENOUS at 21:16

## 2017-10-04 RX ADMIN — MIDAZOLAM HYDROCHLORIDE 4 MG: 1 INJECTION, SOLUTION INTRAMUSCULAR; INTRAVENOUS at 16:07

## 2017-10-04 RX ADMIN — SODIUM CHLORIDE AND POTASSIUM CHLORIDE: 9; 1.49 INJECTION, SOLUTION INTRAVENOUS at 03:52

## 2017-10-04 RX ADMIN — PROPOFOL 120 MG: 10 INJECTION, EMULSION INTRAVENOUS at 16:42

## 2017-10-04 RX ADMIN — CEFEPIME HYDROCHLORIDE 2 G: 2 INJECTION, POWDER, FOR SOLUTION INTRAVENOUS at 03:52

## 2017-10-04 RX ADMIN — SODIUM CHLORIDE, POTASSIUM CHLORIDE, SODIUM LACTATE AND CALCIUM CHLORIDE: 600; 310; 30; 20 INJECTION, SOLUTION INTRAVENOUS at 16:00

## 2017-10-04 RX ADMIN — ACETAMINOPHEN 650 MG: 325 TABLET ORAL at 07:41

## 2017-10-04 RX ADMIN — SIMVASTATIN 10 MG: 5 TABLET, FILM COATED ORAL at 21:17

## 2017-10-04 RX ADMIN — SODIUM CHLORIDE AND POTASSIUM CHLORIDE: 9; 1.49 INJECTION, SOLUTION INTRAVENOUS at 19:02

## 2017-10-04 NOTE — PROGRESS NOTES
OCCUPATIONAL THERAPY: Chart reviewed, spoke with RN. Pt is scheduled for 1:00 PM surgery for a stent placement.  OT will continue to  follow

## 2017-10-04 NOTE — PERIOP NOTES
TRANSFER - OUT REPORT:    Verbal report given to laverne rn(name) on Zina December  being transferred to [facility](unit) for routine post - op       Report consisted of patients Situation, Background, Assessment and   Recommendations(SBAR). Information from the following report(s) SBAR, Procedure Summary, Intake/Output and MAR was reviewed with the receiving nurse. Lines:   Peripheral IV 09/30/17 Right Hand (Active)   Site Assessment Clean, dry, & intact 10/4/2017  5:19 PM   Phlebitis Assessment 0 10/4/2017  5:19 PM   Infiltration Assessment 0 10/4/2017  5:19 PM   Dressing Status Clean, dry, & intact 10/4/2017  5:19 PM   Dressing Type Tape;Transparent 10/4/2017  5:19 PM   Hub Color/Line Status Green 10/4/2017  5:19 PM   Action Taken Open ports on tubing capped 10/4/2017  3:56 AM   Alcohol Cap Used Yes 10/4/2017 10:03 AM        Opportunity for questions and clarification was provided.       Patient transported with:   Registered Nurse

## 2017-10-04 NOTE — PROGRESS NOTES
Carlos Ryan [facility] [address]  [address] [facility], [place], [address] [place] [address]  [phone]      Medical Progress Note      NAME: Steve Valdovinos   :  1960  MRM:  [id number]    Date/Time: 10/4/2017          Subjective:     Chief Complaint:  F/u pyelo    Sweaty today, feels feverish. Denies abdominal pain or back pain            Objective:       Vitals:          Last 24hrs VS reviewed since prior progress note. Most recent are:    Visit Vitals    /75 (BP 1 Location: Left arm, BP Patient Position: At rest)    Pulse 88    Temp 100 °F (37.8 °C)    Resp 21    Ht 5' 2\" (1.575 m)    Wt 123.8 kg (273 lb)    SpO2 94%    BMI 49.93 kg/m2     SpO2 Readings from Last 6 Encounters:   10/04/17 94%   17 93%   17 95%   17 97%   17 96%   17 96%    O2 Flow Rate (L/min): 2 l/min       Intake/Output Summary (Last 24 hours) at 10/04/17 0928  Last data filed at 10/04/17 0520   Gross per 24 hour   Intake          4748.33 ml   Output             4800 ml   Net           -51.67 ml          Exam:     Physical Exam:    Gen:  Chronically ill-appearing. Disheveled. No acute distress  HEENT:  Sclerae nonicteric, hearing intact to voice, mucous membranes moist  Neck:  Supple, without masses. Resp:  No accessory muscle use, CTAB without wheezes, rales, or rhonchi  Card: RRR, without m/r/g. Trace LE edema. Abd:  +bowel sounds, soft, nondistended. Mild TTP diffusely  Neuro: Face symmetric, tongue midline, speech fluent, follows commands appropriately  Psych:  Alert, oriented x 3. Good insight. Depressed.  Tearful      Medications Reviewed: (see below)    Lab Data Reviewed: (see below)    ______________________________________________________________________    Medications:     Current Facility-Administered Medications   Medication Dose Route Frequency    morphine injection 2 mg  2 mg IntraVENous Q4H PRN    cefepime (MAXIPIME) 2 g in 0.9% sodium chloride (MBP/ADV) 100 mL  2 g IntraVENous Q8H    venlafaxine-SR (EFFEXOR-XR) capsule 225 mg  225 mg Oral DAILY WITH BREAKFAST    dextroamphetamine-amphetamine (ADDERALL) tablet 15 mg  15 mg Oral BID    QUEtiapine (SEROquel) tablet 50 mg  50 mg Oral QHS    0.9% sodium chloride with KCl 20 mEq/L infusion   IntraVENous CONTINUOUS    nicotine (NICODERM CQ) 21 mg/24 hr patch 1 Patch  1 Patch TransDERmal Q24H    sodium chloride (NS) flush 5-10 mL  5-10 mL IntraVENous PRN    sodium chloride (NS) flush 5-10 mL  5-10 mL IntraVENous Q8H    sodium chloride (NS) flush 5-10 mL  5-10 mL IntraVENous PRN    acetaminophen (TYLENOL) tablet 650 mg  650 mg Oral Q4H PRN    HYDROcodone-acetaminophen (NORCO) 5-325 mg per tablet 1 Tab  1 Tab Oral Q4H PRN    naloxone (NARCAN) injection 0.4 mg  0.4 mg IntraVENous PRN    ondansetron (ZOFRAN) injection 4 mg  4 mg IntraVENous Q4H PRN    enoxaparin (LOVENOX) injection 40 mg  40 mg SubCUTAneous Q12H    sodium chloride (NS) flush 5-10 mL  5-10 mL IntraVENous PRN    LORazepam (ATIVAN) tablet 1 mg  1 mg Oral BID    levothyroxine (SYNTHROID) tablet 112 mcg  112 mcg Oral ACB    dilTIAZem CD (CARDIZEM CD) capsule 180 mg  180 mg Oral DAILY    simvastatin (ZOCOR) tablet 10 mg  10 mg Oral QHS    albuterol (PROVENTIL VENTOLIN) nebulizer solution 2.5 mg  2.5 mg Nebulization Q4H PRN            Lab Review:     Recent Labs      10/04/17   0349  10/02/17   0451   WBC  6.6  7.2   HGB  11.1*  10.7*   HCT  33.7*  31.7*   PLT  248  170     Recent Labs      10/04/17   0349  10/02/17   0451   NA  140  136   K  3.5  3.4*   CL  105  102   CO2  30  23   GLU  91  83   BUN  10  9   CREA  0.99  1.02   CA  8.5  8.1*   MG   --   1.9   PHOS   --   2.5*   ALB   --   1.9*   SGOT   --   37   ALT   --   23     No components found for: GLPOC  No results for input(s): PH, PCO2, PO2, HCO3, FIO2 in the last 72 hours. No results for input(s): INR in the last 72 hours.     No lab exists for component: INREXT, INREXT  Lab Results   Component Value Date/Time Specimen Description: BLOOD 04/20/2009 05:18 PM    Specimen Description: STOOL 04/20/2009 11:20 AM     Lab Results   Component Value Date/Time    Culture result: NO GROWTH AFTER 7 HOURS 10/03/2017 11:23 PM    Culture result: NO GROWTH 2 DAYS 10/02/2017 04:51 AM    Culture result: KLEBSIELLA PNEUMONIAE 09/30/2017 04:48 PM            Assessment / Plan:       Severe sepsis/ pyelo / bacteremia: 2/2 UTI and likely pyelo. Cultures growing pan-sensitive klebsiella; prior cultures with klebsiella sensitive to cephalosporins. US showed large kidney stone with hydronephrosis. Continue abx. Urology following    Nephrolithiasis: urology following, plan for stent placement today. Hyperbilirubinemia / elevated alk phos: RUQ US showed no gallstones or biliary ductal dilatation. Elevated bili likely from sepsis      Depression (8/19/2010): seems severe. Prior hospitalization at [facility]. Depressed, tearful. TSH WNR. cont Celexa and Effexor and Ativan. Evaluated by psych, meds adjusted ok for discharge from their standpoint      Hypercholesteremia: cont statin      Hypothyroid (): TSH WNR. cont LT4      JING (acute kidney injury): mild, improving nicely      Hyponatremia: resolved. monitor on IVF      HTN (hypertension): controlled. cont diltiazem      Hypokalemia: replete K.  Follow Mg      Tobacco abuse: smoking cessation advised      Total time spent with patient: 30 minutes                 Care Plan discussed with: Patient, Nursing Staff and >50% of time spent in counseling and coordination of care    Discussed:  Care Plan and D/C Planning    Prophylaxis:  Lovenox    Disposition:  Home w/Family           ___________________________________________________    Attending Physician: Osvaldo Day MD

## 2017-10-04 NOTE — PROGRESS NOTES
Progress Note    Patient: Emile Newberry MRN: [id number]  SSN: [id number]    YOB: 1960  Age: 56 y.o. Sex: female        ADMITTED:  2017 to Morro Yanez MD  for Severe sepsis [facility])         Emile Newberry was admitted for Severe sepsis [facility]). Spiked fever    Vitals:  Temp (24hrs), Av.1 °F (37.8 °C), Min:98.7 °F (37.1 °C), Max:101.8 °F (38.8 °C)     Blood pressure 142/75, pulse 88, temperature 100 °F (37.8 °C), resp. rate 21, height 5' 2\" (1.575 m), weight 123.8 kg (273 lb), last menstrual period 2007, SpO2 94 %. I&O's:  10/02 1901 - 10/04 0700  In: 4748.3 [I.V.:4748.3]  Out: 6600 [Urine:6600]         Exam:   NAD. Labs:   Recent Labs      10/04/17   0349  10/02/17   0451   WBC  6.6  7.2   HGB  11.1*  10.7*   HCT  33.7*  31.7*   PLT  248  170     Recent Labs      10/04/17   0349  10/02/17   0451   NA  140  136   K  3.5  3.4*   CL  105  102   CO2  30  23   GLU  91  83   BUN  10  9   CREA  0.99  1.02   CA  8.5  8.1*        Cultures:      Imaging:       Assessment:     - Principal Problem:    Severe sepsis ([facility] [[email or web address] 75.) (2017)    Active Problems:    Depression (2010)      Overview: Sees Dr. Melvin amezcua in 2011      Hospitalized [facility] 2011 for depression      I would recommend that we not refill any psychoactive medicines      Hypercholesteremia (2010)      Tobacco abuse ()      Hypothyroid ()      [facility] (2017)      Hyponatremia (2017)      HTN (hypertension) (2017)        Plan:     - wbc, cr better but spiking fevers. - recommend stent placement.   - npo     Signed By: Anu Gómez MD - 2017

## 2017-10-04 NOTE — ANESTHESIA PREPROCEDURE EVALUATION
Anesthetic History   No history of anesthetic complications            Review of Systems / Medical History  Patient summary reviewed and pertinent labs reviewed    Pulmonary          Smoker         Neuro/Psych         Psychiatric history     Cardiovascular    Hypertension              Exercise tolerance: >4 METS     GI/Hepatic/Renal         Renal disease: stones       Endo/Other      Hypothyroidism: well controlled  Morbid obesity     Other Findings              Physical Exam    Airway  Mallampati: III  TM Distance: 4 - 6 cm  Neck ROM: normal range of motion   Mouth opening: Normal     Cardiovascular  Regular rate and rhythm,  S1 and S2 normal,  no murmur, click, rub, or gallop  Rhythm: regular  Rate: normal         Dental    Dentition: Poor dentition     Pulmonary  Breath sounds clear to auscultation               Abdominal  GI exam deferred       Other Findings            Anesthetic Plan    ASA: 3  Anesthesia type: general          Induction: Intravenous  Anesthetic plan and risks discussed with: Patient

## 2017-10-04 NOTE — ANESTHESIA POSTPROCEDURE EVALUATION
Post-Anesthesia Evaluation and Assessment    Patient: Ivon Vee MRN: [id number]  SSN: [id number]    YOB: 1960  Age: 56 y.o. Sex: female       Cardiovascular Function/Vital Signs  Visit Vitals    /85 (BP 1 Location: Left arm, BP Patient Position: At rest)    Pulse 87    Temp 36.9 °C (98.5 °F)    Resp 19    Ht 5' 2\" (1.575 m)    Wt 123 kg (271 lb 2.7 oz)    SpO2 90%    BMI 49.6 kg/m2       Patient is status post general anesthesia for Procedure(s):  RIGHT DOUBLE J STENT PLACEMENT. Nausea/Vomiting: None    Postoperative hydration reviewed and adequate. Pain:  Pain Scale 1: Numeric (0 - 10) (10/04/17 1741)  Pain Intensity 1: 0 (10/04/17 1741)   Managed    Neurological Status:   Neuro (WDL): Exceptions to WDL (10/04/17 1741)  Neuro  Neurologic State: Restless (10/04/17 1741)  Orientation Level: Oriented to person;Oriented to situation (10/04/17 1716)  Cognition: Appropriate decision making (10/04/17 1716)  Speech: Clear (10/04/17 1716)  Assessment L Pupil: Round (10/03/17 1046)  Assessment R Pupil: Round (10/03/17 1046)  LUE Motor Response: Purposeful (10/04/17 1741)  LLE Motor Response: Purposeful (10/04/17 1741)  RUE Motor Response: Purposeful (10/04/17 1741)  RLE Motor Response: Purposeful (10/04/17 1741)   At baseline    Mental Status and Level of Consciousness: Arousable    Pulmonary Status:   O2 Device: Nasal cannula (10/04/17 1741)   Adequate oxygenation and airway patent    Complications related to anesthesia: None    Post-anesthesia assessment completed.  No concerns    Signed By: Cesia Esposito MD     October 4, 2017

## 2017-10-04 NOTE — PERIOP NOTES
Cefepime abx completed around 1500. Patient voided multiple times on bedpan. Skin and pericare completed. Dr. Crandall Client has seen patient and consents and signed.

## 2017-10-04 NOTE — PROGRESS NOTES
Bedside and Verbal shift change report given to [employer] RN (oncoming nurse) by Gal Baker RN (offgoing nurse). Report included the following information SBAR, Kardex, Intake/Output, MAR, Accordion and Recent Results.

## 2017-10-04 NOTE — ROUTINE PROCESS
Bedside and Verbal shift change report given to Minerva Scott RN (oncoming nurse) by Florence JAEGER (offgoing nurse). Report included the following information SBAR, Kardex, Intake/Output, Recent Results and Cardiac Rhythm NSR.

## 2017-10-04 NOTE — BRIEF OP NOTE
BRIEF OPERATIVE NOTE    Date of Procedure: 10/4/2017   Preoperative Diagnosis: Right Renal Stone, UTI  Postoperative Diagnosis: Right Renal Stone, UTI    Procedure(s):  RIGHT DOUBLE J STENT PLACEMENT  Surgeon(s) and Role:     * Yandel Luna MD - Primary         Assistant Staff:       Surgical Staff:  Circ-1: Hussein Leigh RN  Circ-2: Tony Anders RN  Circ-Intern: Marti Matamoros  Scrub Tech-1: Carlos Kuhn  Event Time In   Incision Start 1634   Incision Close 3530     Anesthesia: General   Estimated Blood Loss: none  Specimens: * No specimens in log *   Findings: right lower pole stone without obvious obstruction. Able to negotiate a stent into lower pole calyx beyond the stone   Complications: none  Implants:   Implant Name Type Inv.  Item Serial No.  Lot No. LRB No. Used Action   STENT URET FLX SFT [id number] -- CONTOUR PERCUFLEX - SNA   STENT URET FLX SFT [id number] -- CONTOUR PERCUFLEX NA Fishidy [facility] [id number] Right 1 Implanted

## 2017-10-05 PROCEDURE — 74011000258 HC RX REV CODE- 258: Performed by: INTERNAL MEDICINE

## 2017-10-05 PROCEDURE — 74011250637 HC RX REV CODE- 250/637: Performed by: INTERNAL MEDICINE

## 2017-10-05 PROCEDURE — 74011250636 HC RX REV CODE- 250/636: Performed by: INTERNAL MEDICINE

## 2017-10-05 PROCEDURE — 77010033678 HC OXYGEN DAILY

## 2017-10-05 PROCEDURE — 65660000000 HC RM CCU STEPDOWN

## 2017-10-05 PROCEDURE — 97535 SELF CARE MNGMENT TRAINING: CPT

## 2017-10-05 PROCEDURE — 77030027138 HC INCENT SPIROMETER -A

## 2017-10-05 PROCEDURE — 74011250637 HC RX REV CODE- 250/637: Performed by: PSYCHIATRY & NEUROLOGY

## 2017-10-05 PROCEDURE — 97530 THERAPEUTIC ACTIVITIES: CPT

## 2017-10-05 RX ADMIN — LORAZEPAM 1 MG: 1 TABLET ORAL at 08:57

## 2017-10-05 RX ADMIN — DEXTROAMPHETAMINE SACCHARATE, AMPHETAMINE ASPARTATE, DEXTROAMPHETAMINE SULFATE AND AMPHETAMINE SULFATE 15 MG: 2.5; 2.5; 2.5; 2.5 TABLET ORAL at 17:55

## 2017-10-05 RX ADMIN — CEFEPIME HYDROCHLORIDE 2 G: 2 INJECTION, POWDER, FOR SOLUTION INTRAVENOUS at 11:47

## 2017-10-05 RX ADMIN — SIMVASTATIN 10 MG: 5 TABLET, FILM COATED ORAL at 21:37

## 2017-10-05 RX ADMIN — LORAZEPAM 1 MG: 1 TABLET ORAL at 20:54

## 2017-10-05 RX ADMIN — DILTIAZEM HYDROCHLORIDE 180 MG: 180 CAPSULE, COATED, EXTENDED RELEASE ORAL at 08:57

## 2017-10-05 RX ADMIN — VENLAFAXINE HYDROCHLORIDE 225 MG: 150 CAPSULE, EXTENDED RELEASE ORAL at 08:57

## 2017-10-05 RX ADMIN — SODIUM CHLORIDE AND POTASSIUM CHLORIDE: 9; 1.49 INJECTION, SOLUTION INTRAVENOUS at 06:00

## 2017-10-05 RX ADMIN — DEXTROAMPHETAMINE SACCHARATE, AMPHETAMINE ASPARTATE, DEXTROAMPHETAMINE SULFATE AND AMPHETAMINE SULFATE 15 MG: 2.5; 2.5; 2.5; 2.5 TABLET ORAL at 12:28

## 2017-10-05 RX ADMIN — QUETIAPINE FUMARATE 50 MG: 25 TABLET ORAL at 21:37

## 2017-10-05 RX ADMIN — Medication 10 ML: at 06:00

## 2017-10-05 RX ADMIN — CEFEPIME HYDROCHLORIDE 2 G: 2 INJECTION, POWDER, FOR SOLUTION INTRAVENOUS at 20:54

## 2017-10-05 RX ADMIN — LEVOTHYROXINE SODIUM 112 MCG: 112 TABLET ORAL at 06:01

## 2017-10-05 RX ADMIN — ENOXAPARIN SODIUM 40 MG: 40 INJECTION SUBCUTANEOUS at 08:56

## 2017-10-05 RX ADMIN — HYDROCODONE BITARTRATE AND ACETAMINOPHEN 1 TABLET: 5; 325 TABLET ORAL at 15:58

## 2017-10-05 RX ADMIN — CEFEPIME HYDROCHLORIDE 2 G: 2 INJECTION, POWDER, FOR SOLUTION INTRAVENOUS at 04:30

## 2017-10-05 RX ADMIN — SODIUM CHLORIDE AND POTASSIUM CHLORIDE: 9; 1.49 INJECTION, SOLUTION INTRAVENOUS at 17:55

## 2017-10-05 RX ADMIN — ENOXAPARIN SODIUM 40 MG: 40 INJECTION SUBCUTANEOUS at 20:54

## 2017-10-05 NOTE — PROGRESS NOTES
Bedside and Verbal shift change report given to [employer], RN (oncoming nurse) by Jaz Gregory RN (offgoing nurse). Report included the following information SBAR, Kardex, Intake/Output, MAR, Accordion and Recent Results.

## 2017-10-05 NOTE — PROGRESS NOTES
Spiritual Care Partner Volunteer visited patient on 10/5/17. Documented by:    Kishore Grajeda M.S.   Spiritual Care Department  If needs arise please call NELIDA ([phone])

## 2017-10-05 NOTE — PROGRESS NOTES
Chart reviewed. If afebrile ok to go home on abx from my stand point. Will arrange follow up next week.  I Plan to see in am.

## 2017-10-05 NOTE — OP NOTES
[facility] [place] [facility]   [address] [facility], [address]   OP NOTE       Name:  Yecenia Cabrera   MR#:  [id number]   :  1960   Account #:  [de-identified]    Surgery Date:  10/04/2017   Date of Adm:  2017       PREOPERATIVE DIAGNOSIS: Right lower pole renal stone. POSTOPERATIVE DIAGNOSIS: Right lower pole renal stone. PROCEDURES PERFORMED:   1. Cystoscopy. 2. Right retrograde pyelogram.   3. Right double-J stent placement. SURGEON: Claudia Mckeon MD    ANESTHESIA: General.    COMPLICATIONS: None. ESTIMATED BLOOD LOSS: None. TUBES AND DRAINS: A 6-[place] Islander, 24-cm double-J stent. SPECIMENS REMOVED: None. INDICATIONS FOR PROCEDURE: The patient is a 61-year-old   female with a history of right pyelonephritis and a 1 cm stone in the   right lower pole of the kidney. She continued to have fever and was   seen by Dr. Paulo Bradley. It was decided to place a right stent since she   has continuing to have fever and I was asked to performed right stent   placement. I met with her and her family in the preoperative area and I   explained the risks and benefits. Given the location of the stone and   the fact that obstruction is doubtful, I explained that I will try my best to   place the stent beyond the stone to relieve the obstruction. However, it   may be possible due to the location, that the stent may not go in that   direction. Also, her fever may or may not get better after stent   placement. I explained the risks including fever, infection, bleeding,   injury to the ureter or need for additional procedures. DESCRIPTION OF PROCEDURE: After informed consent was   obtained, she was brought to the OR and placed supine on the table. General anesthesia was smoothly induced. Her perineum and genitalia   were prepped and draped in standard sterile fashion. She was in   dorsal lithotomy.  Multidisciplinary time-out was performed and   preoperative antibiotic was administered. A 21-[place] citizen rigid cystourethroscope was introduced into the bladder. Using the open-ended ureteral catheter, a sensor guidewire was   passed up in the right kidney. Radiopaque stone was seen in the area   of the right kidney. Next, open-ended catheter was advanced all the   way up to the kidney and a retrograde pyelogram was performed. There was no evidence of hydronephrosis and a 1 cm stone was seen   as a filling defect in the right lower pole infundibular area. The ureteral   catheter was left in place and using an angled Glidewire, I was able to   negotiate into the lower pole ana rosa beyond the stone. Once again, I   performed a retrograde pyelogram in this location to confirm that ana rosa   was intact and that we were, in fact, in the lower pole ana rosa. After this,   Angled tip glidewire was left in place and the ureteral catheter was   removed. Over the angled tip Glidewire, I was able to place a 6-  [place] citizen 24 cm double-J stent. There was good distal curl. In the kidney,   I could not achieve very good proximal curl but the stent seemed to   curl in the lower pole ana rosa beyond this stone. The bladder was emptied   and she was extubated and transferred to the PACU in stable   condition. Dr. Radha Gonzalez will resume care starting tomorrow.         MD LIZANDRO Mccray / SIERRA   D:  10/04/2017   16:59   T:  10/05/2017   04:59   Job #:  [id number]

## 2017-10-05 NOTE — PROGRESS NOTES
Problem: Patient Education: Go to Patient Education Activity  Goal: Patient/Family Education  PHYSICAL THERAPY TREATMENT  Patient: Ivon Vee (41 y.o. female)  Date: 10/5/2017  Diagnosis: Severe sepsis ([facility]  Renal Stone, UTI Severe sepsis ([facility]  Procedure(s) (LRB):  RIGHT DOUBLE J STENT PLACEMENT (Right) 1 Day Post-Op  Precautions: Fall  Chart, physical therapy assessment, plan of care and goals were reviewed. ASSESSMENT:  Pt comes to sit with mod assist of 2. Pt to stand with CGA to min assist of 2. Pt stands with RW and flexed trunk. Pt with much difficulty pivoting feet towards bedside chair needing mod assist of 2 to advance feet. Pt left sitting. Pt reports previous issues with her ankle that limited her when pivoting. Pt progress slow. continue goals. Progression toward goals:  [ ]      Improving appropriately and progressing toward goals  [X]      Improving slowly and progressing toward goals  [ ]      Not making progress toward goals and plan of care will be adjusted       PLAN:  Patient continues to benefit from skilled intervention to address the above impairments. Continue treatment per established plan of care.   Discharge Recommendations:  Rehab vs Skilled Nursing Facility  Further Equipment Recommendations for Discharge:  rolling walker       SUBJECTIVE:          OBJECTIVE DATA SUMMARY:   Critical Behavior:  Neurologic State: Alert  Orientation Level: Oriented X4  Cognition: Decreased attention/concentration  Safety/Judgement: Decreased awareness of need for assistance, Decreased awareness of environment, Decreased awareness of need for safety, Decreased insight into deficits, Lack of insight into deficits  Functional Mobility Training:  Bed Mobility:     Supine to Sit: Moderate assistance;Assist x2                          Transfers:  Sit to Stand: Contact guard assistance;Minimum assistance;Assist x2  Stand to Sit: Minimum assistance;Assist x2        Bed to Chair: Maximum assistance;Assist x2 Balance:  Sitting: High guard  Standing: Without support  Ambulation/Gait Training:     Assistive Device: Gait belt;Walker, rolling 2ft  Ambulation - Level of Assistance: Moderate assistance;Assist x2        Gait Abnormalities: Decreased step clearance; Antalgic        Base of Support: Widened        Step Length: Left shortened;Right shortened                 Pain:  Pain Scale 1: Numeric (0 - 10)  Pain Intensity 1: 0              Activity Tolerance:   Pt tolerated treatment fairly well. Please refer to the flowsheet for vital signs taken during this treatment.   After treatment:   [X] Patient left in no apparent distress sitting up in chair  [ ] Patient left in no apparent distress in bed  [ ] Call bell left within reach  [ ] Nursing notified  [ ] Caregiver present  [ ] Bed alarm activated      COMMUNICATION/COLLABORATION:   The patients plan of care was discussed with: Physical Therapist     Anitha Crabtree PTA   Time Calculation: 23 mins

## 2017-10-05 NOTE — PROGRESS NOTES
Carlos Ryan [facility]  [address] [facility], [address] [place] [address]  [phone]      Medical Progress Note      NAME: Tommie Jaimes   :  1960  MRM:  [id number]    Date/Time: 10/5/2017          Subjective:     Chief Complaint:  F/u pyelo    No acute events. Pt is sp stent placement. Denies abdominal pain, back pain. Objective:       Vitals:          Last 24hrs VS reviewed since prior progress note. Most recent are:    Visit Vitals    /88 (BP 1 Location: Left arm, BP Patient Position: At rest)    Pulse 82    Temp 99.1 °F (37.3 °C)    Resp 20    Ht 5' 2\" (1.575 m)    Wt 123 kg (271 lb 2.7 oz)    SpO2 94%    BMI 49.6 kg/m2     SpO2 Readings from Last 6 Encounters:   10/05/17 94%   17 93%   17 95%   17 97%   17 96%   17 96%    O2 Flow Rate (L/min): 2 l/min       Intake/Output Summary (Last 24 hours) at 10/05/17 1027  Last data filed at 10/05/17 0853   Gross per 24 hour   Intake                0 ml   Output             2200 ml   Net            -2200 ml          Exam:     Physical Exam:    Gen:  obese. No acute distress  HEENT:  Sclerae nonicteric, hearing intact to voice, mucous membranes moist  Neck:  Supple, without masses. Resp:  No accessory muscle use, CTAB without wheezes, rales, or rhonchi  Card: RRR, without m/r/g. Trace LE edema. Abd:  +bowel sounds, soft, nondistended. Non-TTP  Neuro: no focal deficits. follows commands appropriately  Psych:  Alert, oriented x 3. Good insight.       Medications Reviewed: (see below)    Lab Data Reviewed: (see below)    ______________________________________________________________________    Medications:     Current Facility-Administered Medications   Medication Dose Route Frequency    lidocaine (PF) (XYLOCAINE) 10 mg/mL (1 %) injection 0.1 mL  0.1 mL SubCUTAneous PRN    lactated Ringers infusion  100 mL/hr IntraVENous CONTINUOUS    sodium chloride (NS) flush 5-10 mL  5-10 mL IntraVENous Q8H    sodium chloride (NS) flush 5-10 mL  5-10 mL IntraVENous PRN    sodium chloride (NS) flush 5-10 mL  5-10 mL IntraVENous Q8H    sodium chloride (NS) flush 5-10 mL  5-10 mL IntraVENous PRN    morphine injection 2 mg  2 mg IntraVENous Q4H PRN    cefepime (MAXIPIME) 2 g in 0.9% sodium chloride (MBP/ADV) 100 mL  2 g IntraVENous Q8H    venlafaxine-SR (EFFEXOR-XR) capsule 225 mg  225 mg Oral DAILY WITH BREAKFAST    dextroamphetamine-amphetamine (ADDERALL) tablet 15 mg  15 mg Oral BID    QUEtiapine (SEROquel) tablet 50 mg  50 mg Oral QHS    0.9% sodium chloride with KCl 20 mEq/L infusion   IntraVENous CONTINUOUS    nicotine (NICODERM CQ) 21 mg/24 hr patch 1 Patch  1 Patch TransDERmal Q24H    sodium chloride (NS) flush 5-10 mL  5-10 mL IntraVENous PRN    sodium chloride (NS) flush 5-10 mL  5-10 mL IntraVENous Q8H    sodium chloride (NS) flush 5-10 mL  5-10 mL IntraVENous PRN    acetaminophen (TYLENOL) tablet 650 mg  650 mg Oral Q4H PRN    HYDROcodone-acetaminophen (NORCO) 5-325 mg per tablet 1 Tab  1 Tab Oral Q4H PRN    naloxone (NARCAN) injection 0.4 mg  0.4 mg IntraVENous PRN    ondansetron (ZOFRAN) injection 4 mg  4 mg IntraVENous Q4H PRN    enoxaparin (LOVENOX) injection 40 mg  40 mg SubCUTAneous Q12H    sodium chloride (NS) flush 5-10 mL  5-10 mL IntraVENous PRN    LORazepam (ATIVAN) tablet 1 mg  1 mg Oral BID    levothyroxine (SYNTHROID) tablet 112 mcg  112 mcg Oral ACB    dilTIAZem CD (CARDIZEM CD) capsule 180 mg  180 mg Oral DAILY    simvastatin (ZOCOR) tablet 10 mg  10 mg Oral QHS    albuterol (PROVENTIL VENTOLIN) nebulizer solution 2.5 mg  2.5 mg Nebulization Q4H PRN            Lab Review:     Recent Labs      10/04/17   0349   WBC  6.6   HGB  11.1*   HCT  33.7*   PLT  248     Recent Labs      10/04/17   0349   NA  140   K  3.5   CL  105   CO2  30   GLU  91   BUN  10   CREA  0.99   CA  8.5     No components found for: GLPOC  No results for input(s): PH, PCO2, PO2, HCO3, FIO2 in the last 72 hours. No results for input(s): INR in the last 72 hours. No lab exists for component: Paulo Grossman  Lab Results   Component Value Date/Time    Specimen Description: BLOOD 04/20/2009 05:18 PM    Specimen Description: STOOL 04/20/2009 11:20 AM     Lab Results   Component Value Date/Time    Culture result: NO GROWTH 1 DAY 10/03/2017 11:23 PM    Culture result: NO GROWTH 3 DAYS 10/02/2017 04:51 AM    Culture result: KLEBSIELLA PNEUMONIAE 09/30/2017 04:48 PM            Assessment / Plan:       Severe sepsis/ pyelo / bacteremia: 2/2 UTI and likely pyelo. Cultures growing pan-sensitive klebsiella; prior cultures with klebsiella sensitive to cephalosporins. US showed large kidney stone with hydronephrosis. Continue abx. Urology following, pt is now sp stent placement  --afebrile sp stent placement    Nephrolithiasis: urology following, sp stent placement 10/4     Hyperbilirubinemia / elevated alk phos: RUQ US showed no gallstones or biliary ductal dilatation. Elevated bili likely from sepsis      Depression (8/19/2010): seems severe. Prior hospitalization at [facility]. Depressed, tearful. TSH WNR. cont Celexa and Effexor and Ativan. Evaluated by psych, meds adjusted ok for discharge from their standpoint      Hypercholesteremia: cont statin      Hypothyroid (): TSH WNR. cont LT4      JING (acute kidney injury): mild, improving nicely      Hyponatremia: resolved. monitor on IVF      HTN (hypertension): controlled. cont diltiazem      Hypokalemia: replete K.  Follow Mg      Tobacco abuse: smoking cessation advised      Total time spent with patient: 30 minutes                 Care Plan discussed with: Patient, Nursing Staff and >50% of time spent in counseling and coordination of care    Discussed:  Care Plan and D/C Planning    Prophylaxis:  Lovenox    Disposition:  Home w/Family           ___________________________________________________    Attending Physician: Jackson Urbina MD

## 2017-10-05 NOTE — PROGRESS NOTES
Problem: Self Care Deficits Care Plan (Adult)  Goal: *Acute Goals and Plan of Care (Insert Text)  Occupational Therapy Goals  Initiated 10/2/2017  1. Patient will maintain attention to functional task >or = 5 minutes with < or = 3 verbal cues within 7 day(s). 2. Patient will perform upper body dressing with supervision/set-up within 7 day(s). 3. Patient will stand with one hand support > or = 3 mintues with minimal assistance/contact guard assist within 7 day(s). 4. Patient will perform toilet transfers with minimal assistance/contact guard assist for stand pivot transfer within 7 day(s). 5. Patient will perform all aspects of toileting with moderate assistance within 7 day(s). 6. Patient will participate in upper extremity therapeutic exercise/activities with supervision/set-up for 10 minutes within 7 day(s). OCCUPATIONAL THERAPY TREATMENT  Patient: Ambrosio Schulte (83 y.o. female)  Date: 10/5/2017  Diagnosis: Severe sepsis ([address]  Renal Stone, UTI Severe sepsis ([address]  Procedure(s) (LRB):  RIGHT DOUBLE J STENT PLACEMENT (Right) 1 Day Post-Op  Precautions: Fall  Chart, occupational therapy assessment, plan of care, and goals were reviewed. ASSESSMENT:  Pt transferred to recliner chair with encouragement and max assist x 2 for safety. Once in the chair she washed her face and upper body with stand by assist, min assist for her backside. She has decreased attention/concentration throughout tasks and bathes the same area twice. Pt instructed to safety and not to get up without assistance, chair alarm activated, RN aware. Pt oriented x 4. She currently has a pure wick in place.  Recommend SNF for rehab vs TBD, pt has been to [facility] in the past.   Progression toward goals:  [ ]          Improving appropriately and progressing toward goals  [X]          Improving slowly and progressing toward goals  [ ]          Not making progress toward goals and plan of care will be adjusted       PLAN:  Patient continues to benefit from skilled intervention to address the above impairments. Continue treatment per established plan of care. Discharge Recommendations:  SNF for rehab vs TBD  Further Equipment Recommendations for Discharge:  None       SUBJECTIVE:   Patient stated My mother is coming here later so I wanted to nap before she comes.       OBJECTIVE DATA SUMMARY:   Cognitive/Behavioral Status:  Neurologic State: Alert  Orientation Level: Oriented X4  Cognition: Decreased attention/concentration           Functional Mobility and Transfers for ADLs:              Bed Mobility:      Moderate assist x 2. Transfers:  Sit to Stand: Maximum assistance;Assist x2        Balance:  Sitting: Intact  Standing: Impaired  ADL Intervention:        Grooming  Washing Face: Stand-by assistance     Upper Body Bathing  Bathing Assistance: Stand-by assistance  Position Performed: Seated in chair     Lower Body Bathing  Bathing Assistance: Maximum assistance  Lower Body : Maximum assistance     Upper Body Dressing Assistance  Dressing Assistance: Minimum assistance  Hospital Gown: Minimum  assistance              Pain:  Pain Scale 1: Numeric (0 - 10)  Pain Intensity 1: 0                 Activity Tolerance:    Fair  Please refer to the flowsheet for vital signs taken during this treatment.   After treatment:   [X]  Patient left in no apparent distress sitting up in chair  [ ]  Patient left in no apparent distress in bed  [X]  Call bell left within reach  [X]  Nursing notified  [ ]  Caregiver present  [X]  Chair  alarm activated      COMMUNICATION/COLLABORATION:   The patients plan of care was discussed with: Physical Therapy Assistant, Occupational Therapist and Registered Nurse     ROSALIND Rojo  Time Calculation: 24 mins

## 2017-10-05 NOTE — ROUTINE PROCESS
Bedside and Verbal shift change report given to Nathalia (oncoming nurse) by Florence JAEGER (offgoing nurse). Report included the following information SBAR, Kardex, Intake/Output, Recent Results and Cardiac Rhythm NSR.

## 2017-10-06 LAB
ANION GAP SERPL CALC-SCNC: 4 MMOL/L (ref 5–15)
BACTERIA SPEC CULT: ABNORMAL
BACTERIA SPEC CULT: NORMAL
BUN SERPL-MCNC: 7 MG/DL (ref 6–20)
BUN/CREAT SERPL: 7 (ref 12–20)
CALCIUM SERPL-MCNC: 8.2 MG/DL (ref 8.5–10.1)
CHLORIDE SERPL-SCNC: 105 MMOL/L (ref 97–108)
CO2 SERPL-SCNC: 30 MMOL/L (ref 21–32)
CREAT SERPL-MCNC: 0.95 MG/DL (ref 0.55–1.02)
GLUCOSE SERPL-MCNC: 93 MG/DL (ref 65–100)
POTASSIUM SERPL-SCNC: 3.5 MMOL/L (ref 3.5–5.1)
SERVICE CMNT-IMP: ABNORMAL
SERVICE CMNT-IMP: NORMAL
SODIUM SERPL-SCNC: 139 MMOL/L (ref 136–145)

## 2017-10-06 PROCEDURE — 36415 COLL VENOUS BLD VENIPUNCTURE: CPT | Performed by: INTERNAL MEDICINE

## 2017-10-06 PROCEDURE — 74011250636 HC RX REV CODE- 250/636: Performed by: INTERNAL MEDICINE

## 2017-10-06 PROCEDURE — 74011250637 HC RX REV CODE- 250/637: Performed by: INTERNAL MEDICINE

## 2017-10-06 PROCEDURE — 65660000000 HC RM CCU STEPDOWN

## 2017-10-06 PROCEDURE — 74011000258 HC RX REV CODE- 258: Performed by: INTERNAL MEDICINE

## 2017-10-06 PROCEDURE — 80048 BASIC METABOLIC PNL TOTAL CA: CPT | Performed by: INTERNAL MEDICINE

## 2017-10-06 PROCEDURE — 74011250637 HC RX REV CODE- 250/637: Performed by: PSYCHIATRY & NEUROLOGY

## 2017-10-06 PROCEDURE — 97535 SELF CARE MNGMENT TRAINING: CPT

## 2017-10-06 PROCEDURE — 97530 THERAPEUTIC ACTIVITIES: CPT

## 2017-10-06 PROCEDURE — 97116 GAIT TRAINING THERAPY: CPT

## 2017-10-06 RX ORDER — VENLAFAXINE HYDROCHLORIDE 75 MG/1
225 CAPSULE, EXTENDED RELEASE ORAL
Qty: 42 CAP | Refills: 0 | Status: SHIPPED | OUTPATIENT
Start: 2017-10-06 | End: 2017-10-20

## 2017-10-06 RX ORDER — PHENAZOPYRIDINE HYDROCHLORIDE 100 MG/1
100 TABLET, FILM COATED ORAL
Status: DISCONTINUED | OUTPATIENT
Start: 2017-10-06 | End: 2017-10-10 | Stop reason: HOSPADM

## 2017-10-06 RX ORDER — PHENAZOPYRIDINE HYDROCHLORIDE 100 MG/1
100 TABLET, FILM COATED ORAL
Qty: 9 TAB | Refills: 0 | Status: SHIPPED | OUTPATIENT
Start: 2017-10-06 | End: 2017-10-09

## 2017-10-06 RX ORDER — DEXTROAMPHETAMINE SACCHARATE, AMPHETAMINE ASPARTATE, DEXTROAMPHETAMINE SULFATE AND AMPHETAMINE SULFATE 3.75; 3.75; 3.75; 3.75 MG/1; MG/1; MG/1; MG/1
15 TABLET ORAL 2 TIMES DAILY
Qty: 14 TAB | Refills: 0 | Status: SHIPPED | OUTPATIENT
Start: 2017-10-06

## 2017-10-06 RX ORDER — QUETIAPINE FUMARATE 25 MG/1
50 TABLET, FILM COATED ORAL
Qty: 14 TAB | Refills: 0 | Status: SHIPPED
Start: 2017-10-06 | End: 2018-01-16

## 2017-10-06 RX ORDER — CIPROFLOXACIN 500 MG/1
500 TABLET ORAL 2 TIMES DAILY
Qty: 14 TAB | Refills: 0 | Status: SHIPPED | OUTPATIENT
Start: 2017-10-06 | End: 2017-10-09

## 2017-10-06 RX ADMIN — MORPHINE SULFATE 2 MG: 2 INJECTION, SOLUTION INTRAMUSCULAR; INTRAVENOUS at 09:07

## 2017-10-06 RX ADMIN — CEFEPIME HYDROCHLORIDE 2 G: 2 INJECTION, POWDER, FOR SOLUTION INTRAVENOUS at 04:13

## 2017-10-06 RX ADMIN — DEXTROAMPHETAMINE SACCHARATE, AMPHETAMINE ASPARTATE, DEXTROAMPHETAMINE SULFATE AND AMPHETAMINE SULFATE 15 MG: 2.5; 2.5; 2.5; 2.5 TABLET ORAL at 08:57

## 2017-10-06 RX ADMIN — LEVOTHYROXINE SODIUM 112 MCG: 112 TABLET ORAL at 06:57

## 2017-10-06 RX ADMIN — SIMVASTATIN 10 MG: 5 TABLET, FILM COATED ORAL at 21:54

## 2017-10-06 RX ADMIN — HYDROCODONE BITARTRATE AND ACETAMINOPHEN 1 TABLET: 5; 325 TABLET ORAL at 21:53

## 2017-10-06 RX ADMIN — QUETIAPINE FUMARATE 25 MG: 25 TABLET ORAL at 21:54

## 2017-10-06 RX ADMIN — CEFEPIME HYDROCHLORIDE 2 G: 2 INJECTION, POWDER, FOR SOLUTION INTRAVENOUS at 12:06

## 2017-10-06 RX ADMIN — SODIUM CHLORIDE AND POTASSIUM CHLORIDE: 9; 1.49 INJECTION, SOLUTION INTRAVENOUS at 21:53

## 2017-10-06 RX ADMIN — ENOXAPARIN SODIUM 40 MG: 40 INJECTION SUBCUTANEOUS at 21:53

## 2017-10-06 RX ADMIN — DEXTROAMPHETAMINE SACCHARATE, AMPHETAMINE ASPARTATE, DEXTROAMPHETAMINE SULFATE AND AMPHETAMINE SULFATE 15 MG: 2.5; 2.5; 2.5; 2.5 TABLET ORAL at 15:21

## 2017-10-06 RX ADMIN — VENLAFAXINE HYDROCHLORIDE 225 MG: 150 CAPSULE, EXTENDED RELEASE ORAL at 08:57

## 2017-10-06 RX ADMIN — LORAZEPAM 1 MG: 1 TABLET ORAL at 21:54

## 2017-10-06 RX ADMIN — DILTIAZEM HYDROCHLORIDE 180 MG: 180 CAPSULE, COATED, EXTENDED RELEASE ORAL at 08:57

## 2017-10-06 RX ADMIN — CEFEPIME HYDROCHLORIDE 2 G: 2 INJECTION, POWDER, FOR SOLUTION INTRAVENOUS at 19:54

## 2017-10-06 RX ADMIN — SODIUM CHLORIDE AND POTASSIUM CHLORIDE: 9; 1.49 INJECTION, SOLUTION INTRAVENOUS at 07:01

## 2017-10-06 RX ADMIN — ENOXAPARIN SODIUM 40 MG: 40 INJECTION SUBCUTANEOUS at 08:57

## 2017-10-06 RX ADMIN — LORAZEPAM 1 MG: 1 TABLET ORAL at 08:57

## 2017-10-06 NOTE — PROGRESS NOTES
Problem: Self Care Deficits Care Plan (Adult)  Goal: *Acute Goals and Plan of Care (Insert Text)  Occupational Therapy Goals  Initiated 10/2/2017  1. Patient will maintain attention to functional task >or = 5 minutes with < or = 3 verbal cues within 7 day(s). 2. Patient will perform upper body dressing with supervision/set-up within 7 day(s). 3. Patient will stand with one hand support > or = 3 mintues with minimal assistance/contact guard assist within 7 day(s). 4. Patient will perform toilet transfers with minimal assistance/contact guard assist for stand pivot transfer within 7 day(s). 5. Patient will perform all aspects of toileting with moderate assistance within 7 day(s). 6. Patient will participate in upper extremity therapeutic exercise/activities with supervision/set-up for 10 minutes within 7 day(s). OCCUPATIONAL THERAPY TREATMENT  Patient: Jovan Funes (60 y.o. female)  Date: 10/6/2017  Diagnosis: Severe sepsis ([address]  Renal Stone, UTI Severe sepsis ([address]  Procedure(s) (LRB):  RIGHT DOUBLE J STENT PLACEMENT (Right) 2 Days Post-Op  Precautions: Fall  Chart, occupational therapy assessment, plan of care, and goals were reviewed. ASSESSMENT:  Pts family present. After pt sat up edge of bed she ambulated to bathroom and transferred to [facility] with moderate assist x 1. She performed her own bladder hygiene. Pt stood at the sink to wash her hands and brush her teeth with contact guard. She returned to sit in chair and doffed/donned gown. Pt still with labile moments crying at times but overall following conversation better as well as commands better. She was left seated in chair with her family present. Recommend SNF.    Progression toward goals:  [ ]          Improving appropriately and progressing toward goals  [X]          Improving slowly and progressing toward goals  [ ]          Not making progress toward goals and plan of care will be adjusted       PLAN:  Patient continues to benefit from skilled intervention to address the above impairments. Continue treatment per established plan of care. Discharge Recommendations:  SNF  Further Equipment Recommendations for Discharge: None       SUBJECTIVE:   Patient stated I want to brush my teeth.       OBJECTIVE DATA SUMMARY:   Cognitive/Behavioral Status:  Neurologic State: Alert  Orientation Level: Oriented X4  Cognition: Follows commands           Functional Mobility and Transfers for ADLs:              Bed Mobility:                            Transfers:     Functional Transfers  Toilet Transfer : Moderate assistance  Cues: Physical assistance  Adaptive Equipment: Grab bars     Balance: Sitting balance intact     ADL Intervention:        Grooming  Grooming Assistance: Contact guard assistance  Brushing Teeth: Contact guard assistance                 Upper Body Dressing Assistance  Dressing Assistance: Minimum assistance  Hospital Gown: Minimum  assistance     Lower Body Dressing Assistance  Socks: Supervision/set-up  Leg Crossed Method Used: No  Position Performed: Long sitting on bed           Pain:  Pain Scale 1: Numeric (0 - 10)  Pain Intensity 1: 0                 Activity Tolerance:    Fair  Please refer to the flowsheet for vital signs taken during this treatment.   After treatment:   [X]  Patient left in no apparent distress sitting up in chair  [ ]  Patient left in no apparent distress in bed  [X]  Call bell left within reach  [X]  Nursing notified  [X]  Caregiver present  [ ]  Bed alarm activated      COMMUNICATION/COLLABORATION:   The patients plan of care was discussed with: Physical Therapy Assistant, Occupational Therapist and Registered Nurse     ROSALIND Pantoja  Time Calculation: 30 mins

## 2017-10-06 NOTE — PROGRESS NOTES
Bedside shift change report given to Gilberto Abreu RN (oncoming nurse) by Cory Kauffman RN (offgoing nurse). Report included the following information SBAR, Kardex, Intake/Output, MAR and Cardiac Rhythm NSR.

## 2017-10-06 NOTE — ROUTINE PROCESS
Scheduled PCP hospital follow-up appointment. Attempted to schedule urology follow-up but urology office closed.  Sue Bond, [address] specialist, [phone]

## 2017-10-06 NOTE — PROGRESS NOTES
Pharmacist Discharge Medication Reconciliation    Discharge Provider:  Dr. Love Lerner      Discharge Medications:      Current Discharge Medication List        START taking these medications         Dose & Instructions Dispensing Information Comments   Morning Noon Evening Bedtime      ciprofloxacin HCl 500 mg tablet   Commonly known as:  CIPRO       Your last dose was: Your next dose is:              Dose:  500 mg   Take 1 Tab by mouth two (2) times a day for 7 days. Quantity:  14 Tab   Refills:  0                               CONTINUE these medications which have CHANGED         Dose & Instructions Dispensing Information Comments   Morning Noon Evening Bedtime      dextroamphetamine-amphetamine 15 mg tablet   Commonly known as:  ADDERALL   What changed:    - when to take this  - reasons to take this       Your last dose was: Your next dose is:              Dose:  15 mg   Take 1 Tab (15 mg total) by mouth two (2) times a day. Max Daily Amount: 30 mg    Quantity:  14 Tab   Refills:  0                         QUEtiapine 25 mg tablet   Commonly known as:  SEROquel   What changed:  how much to take       Your last dose was: Your next dose is:              Dose:  50 mg   Take 2 Tabs by mouth nightly as needed (insomnia, anxiety). Quantity:  14 Tab   Refills:  0                         venlafaxine-SR 75 mg capsule   Commonly known as:  EFFEXOR-XR   What changed:    - medication strength  - how much to take  - how to take this  - when to take this  - additional instructions       Your last dose was: Your next dose is:              Dose:  225 mg   Take 3 Caps by mouth daily (with breakfast) for 14 days.     Quantity:  42 Cap   Refills:  0                               CONTINUE these medications which have NOT CHANGED         Dose & Instructions Dispensing Information Comments   Morning Noon Evening Bedtime      dilTIAZem  mg ER capsule   Commonly known as:  CARDIZEM CD       Your last dose was: Your next dose is:              Dose:  180 mg   Take 1 Cap by mouth daily. Quantity:  90 Cap   Refills:  3                         LORazepam 1 mg tablet   Commonly known as:  ATIVAN       Your last dose was: Your next dose is:              Dose:  1 mg   Take 1 mg by mouth two (2) times a day. Refills:  0                         phenazopyridine 100 mg tablet   Commonly known as:  PYRIDIUM       Your last dose was: Your next dose is:              Dose:  100 mg   Take 1 Tab by mouth three (3) times daily as needed for Pain for up to 3 days. Caution can cause orange staining on clothes from sweating, tears, urine    Quantity:  9 Tab   Refills:  0                         simvastatin 10 mg tablet   Commonly known as:  ZOCOR       Your last dose was: Your next dose is:              TAKE 1 TABLET BY MOUTH NIGHTLY    Quantity:  90 Tab   Refills:  0                         SYNTHROID 112 mcg tablet   Generic drug:  levothyroxine       Your last dose was: Your next dose is:              Dose:  112 mcg   Take 112 mcg by mouth Daily (before breakfast). Refills:  0                         * VITAMIN D3 2,000 unit Tab   Generic drug:  cholecalciferol (vitamin D3)       Your last dose was: Your next dose is:              Dose:  2000 Units   Take 2,000 Units by mouth five (5) days a week. The patient takes 2000 units Monday, Tuesday, Wednesday, Thursday, Friday    Refills:  0                         * VITAMIN D3 2,000 unit Tab   Generic drug:  cholecalciferol (vitamin D3)       Your last dose was: Your next dose is:              Dose:  4000 Units   Take 4,000 Units by mouth two (2) days a week. The patient takes 4000 units on Saturday and Sunday    Refills:  0                         * Notice: This list has 2 medication(s) that are the same as other medications prescribed for you.  Read the directions carefully, and ask your doctor or other care provider to review them with you. STOP taking these medications              citalopram 20 mg tablet   Commonly known as:  CELEXA           nitrofurantoin (macrocrystal-monohydrate) 100 mg capsule   Commonly known as:  MACROBID                    Where to Get Your Medications        Information on where to get these meds will be given to you by the nurse or doctor. !  Ask your nurse or doctor about these medications     ciprofloxacin HCl 500 mg tablet    dextroamphetamine-amphetamine 15 mg tablet    phenazopyridine 100 mg tablet    QUEtiapine 25 mg tablet    venlafaxine-SR 75 mg capsule              The patient's chart, MAR, and AVS were reviewed by Xiomara Echevarria, Pharmacist

## 2017-10-06 NOTE — DISCHARGE INSTRUCTIONS
HOSPITALIST DISCHARGE INSTRUCTIONS  NAME: Serge Barriga   :  1960   MRN:  [id number]     Date/Time:  10/6/2017 10:55 AM    ADMIT DATE: 2017     DISCHARGE DATE: 10/6/2017     ADMITTING DIAGNOSIS:  Kidney and urinary tract infection  Kidney stone    DISCHARGE DIAGNOSIS:  As above    MEDICATIONS:    · It is important that you take the medication exactly as they are prescribed. · Keep your medication in the bottles provided by the pharmacist and keep a list of the medication names, dosages, and times to be taken in your wallet. · Do not take other medications without consulting your doctor. Pain Management: per above medications    What to do at Home:  1. Be sure to take the antibiotic you have been prescribed to finish treatment for your infection  2. Take pyridium as needed for pain  3. Follow up with urology within one week to discuss stent removal  4. Follow up with your primary care within one week to check your potassium    Recommended diet:  Resume previous diet    Recommended activity: Activity as tolerated    If you experience any of the following symptoms then please call your primary care physician or return to the emergency room if you cannot get hold of your doctor:  Fever, chills, nausea, vomiting, diarrhea, change in mentation, falling, bleeding, shortness of breath    Follow Up:  Dr. Estevan Norris MD  you are to call and set up an appointment to see them in 1 week. Information obtained by :  I understand that if any problems occur once I am at home I am to contact my physician. I understand and acknowledge receipt of the instructions indicated above.                                                                                                                                            Physician's or R.N.'s Signature                                                                  Date/Time Patient or Representative Signature                                                          Date/Time

## 2017-10-06 NOTE — PROGRESS NOTES
Carlos Ryan [facility]  [address] [facility], [place], [address] [place] [address]  [phone]      Medical Progress Note      NAME: Delta Weber   :  1960  MRM:  [id number]    Date/Time: 10/6/2017          Subjective:     Chief Complaint:  F/u pyelo    No acute events. No further back or abdominal pain. Denies cp or sob. Feels ready to go home            Objective:       Vitals:          Last 24hrs VS reviewed since prior progress note. Most recent are:    Visit Vitals    /81 (BP 1 Location: Left arm, BP Patient Position: At rest)    Pulse 81    Temp 99.2 °F (37.3 °C)    Resp 20    Ht 5' 2\" (1.575 m)    Wt 127.6 kg (281 lb 4.9 oz)    SpO2 95%    BMI 51.45 kg/m2     SpO2 Readings from Last 6 Encounters:   10/06/17 95%   17 93%   17 95%   17 97%   17 96%   17 96%    O2 Flow Rate (L/min): 2 l/min       Intake/Output Summary (Last 24 hours) at 10/06/17 1059  Last data filed at 10/06/17 0717   Gross per 24 hour   Intake                0 ml   Output             4425 ml   Net            -4425 ml          Exam:     Physical Exam:    Gen:  obese. No acute distress  HEENT:  Sclerae nonicteric, hearing intact to voice, mucous membranes moist  Neck:  Supple, without masses. Resp:  No accessory muscle use, CTAB without wheezes, rales, or rhonchi  Card: RRR, without m/r/g. Trace LE edema. Abd:  +bowel sounds, soft, nondistended. Non-TTP  Neuro: no focal deficits. follows commands appropriately  Psych:  Alert, oriented x 3. Good insight.       Medications Reviewed: (see below)    Lab Data Reviewed: (see below)    ______________________________________________________________________    Medications:     Current Facility-Administered Medications   Medication Dose Route Frequency    phenazopyridine (PYRIDIUM) tablet 100 mg  100 mg Oral TID PRN    lidocaine (PF) (XYLOCAINE) 10 mg/mL (1 %) injection 0.1 mL  0.1 mL SubCUTAneous PRN    sodium chloride (NS) flush 5-10 mL 5-10 mL IntraVENous Q8H    sodium chloride (NS) flush 5-10 mL  5-10 mL IntraVENous PRN    sodium chloride (NS) flush 5-10 mL  5-10 mL IntraVENous Q8H    sodium chloride (NS) flush 5-10 mL  5-10 mL IntraVENous PRN    morphine injection 2 mg  2 mg IntraVENous Q4H PRN    cefepime (MAXIPIME) 2 g in 0.9% sodium chloride (MBP/ADV) 100 mL  2 g IntraVENous Q8H    venlafaxine-SR (EFFEXOR-XR) capsule 225 mg  225 mg Oral DAILY WITH BREAKFAST    dextroamphetamine-amphetamine (ADDERALL) tablet 15 mg  15 mg Oral BID    QUEtiapine (SEROquel) tablet 50 mg  50 mg Oral QHS    0.9% sodium chloride with KCl 20 mEq/L infusion   IntraVENous CONTINUOUS    nicotine (NICODERM CQ) 21 mg/24 hr patch 1 Patch  1 Patch TransDERmal Q24H    sodium chloride (NS) flush 5-10 mL  5-10 mL IntraVENous PRN    sodium chloride (NS) flush 5-10 mL  5-10 mL IntraVENous Q8H    sodium chloride (NS) flush 5-10 mL  5-10 mL IntraVENous PRN    acetaminophen (TYLENOL) tablet 650 mg  650 mg Oral Q4H PRN    HYDROcodone-acetaminophen (NORCO) 5-325 mg per tablet 1 Tab  1 Tab Oral Q4H PRN    naloxone (NARCAN) injection 0.4 mg  0.4 mg IntraVENous PRN    ondansetron (ZOFRAN) injection 4 mg  4 mg IntraVENous Q4H PRN    enoxaparin (LOVENOX) injection 40 mg  40 mg SubCUTAneous Q12H    sodium chloride (NS) flush 5-10 mL  5-10 mL IntraVENous PRN    LORazepam (ATIVAN) tablet 1 mg  1 mg Oral BID    levothyroxine (SYNTHROID) tablet 112 mcg  112 mcg Oral ACB    dilTIAZem CD (CARDIZEM CD) capsule 180 mg  180 mg Oral DAILY    simvastatin (ZOCOR) tablet 10 mg  10 mg Oral QHS    albuterol (PROVENTIL VENTOLIN) nebulizer solution 2.5 mg  2.5 mg Nebulization Q4H PRN            Lab Review:     Recent Labs      10/04/17   0349   WBC  6.6   HGB  11.1*   HCT  33.7*   PLT  248     Recent Labs      10/06/17   0423  10/04/17   0349   NA  139  140   K  3.5  3.5   CL  105  105   CO2  30  30   GLU  93  91   BUN  7  10   CREA  0.95  0.99   CA  8.2*  8.5     No components found for: [place]  No results for input(s): PH, PCO2, PO2, HCO3, FIO2 in the last 72 hours. No results for input(s): INR in the last 72 hours. No lab exists for component: Cielo Hinton  Lab Results   Component Value Date/Time    Specimen Description: BLOOD 04/20/2009 05:18 PM    Specimen Description: STOOL 04/20/2009 11:20 AM     Lab Results   Component Value Date/Time    Culture result: NO GROWTH 2 DAYS 10/03/2017 11:23 PM    Culture result: NO GROWTH 4 DAYS 10/02/2017 04:51 AM    Culture result: KLEBSIELLA PNEUMONIAE 09/30/2017 04:48 PM            Assessment / Plan:       Severe sepsis/ pyelo / bacteremia: 2/2 UTI and likely pyelo. Cultures growing pan-sensitive klebsiella; prior cultures with klebsiella sensitive to cephalosporins. US showed large kidney stone with hydronephrosis. Continue abx. Urology following, pt is now sp stent placement  -discharge with cipro to complete abx course (QTc 438)    Nephrolithiasis: urology following, sp stent placement 10/4. Pt advised to follow up with urology within one week     Hyperbilirubinemia / elevated alk phos: RUQ US showed no gallstones or biliary ductal dilatation. Elevated bili likely from sepsis      Depression (8/19/2010): seems severe. Prior hospitalization at [facility]. Depressed, tearful. TSH WNR. cont Celexa and Effexor and Ativan. Evaluated by psych, meds adjusted ok for discharge from their standpoint      Hypercholesteremia: cont statin      Hypothyroid (): TSH WNR. cont LT4      JING (acute kidney injury): mild, improving nicely      Hyponatremia: resolved. monitor on IVF      HTN (hypertension): controlled. cont diltiazem      Hypokalemia: replete K.  Follow Mg      Tobacco abuse: smoking cessation advised      Total time spent with patient: 33 minutes                 Care Plan discussed with: Patient, Nursing Staff and >50% of time spent in counseling and coordination of care    Discussed:  Care Plan and D/C Planning    Prophylaxis: Lovenox    Disposition:  Home w/Family           ___________________________________________________    Attending Physician: Alida Hanson MD

## 2017-10-06 NOTE — PROGRESS NOTES
1342:  Pt's decision is to go to rehab. Her choices are [facility] (sent in [facility]) and to [address] in [facility] [address] designation. She will need auth. MIL Morley  1144:  Met with pt to let her know PT/OT recommendations for snf. She does not want to go to rehab. She feels she is capable of going home. Dr. Javed Fallon notified. MIL Morley    CM Note:  Attempted to meet with pt re dispo. PT and OT are recommending snf. I spoke with her  who stated he is unable to help her at home. BR's and bathroom are on the second floor. Pt's  to make 3 snf choices. He will be in today.   MIL Morley

## 2017-10-06 NOTE — ROUTINE PROCESS
Bedside shift change report given to [address] [place] [address] (oncoming nurse) by Fam Flores (offgoing nurse). Report included the following information SBAR, Kardex and MAR.

## 2017-10-06 NOTE — PROGRESS NOTES
Progress Note    Patient: Malathi Echavarria MRN: [id number]  SSN: [id number]    YOB: 1960  Age: 56 y.o. Sex: female        ADMITTED:  2017 to Osvaldo Day MD  for Severe sepsis [facility])  Renal Stone, UTI         Maalthi Echavarria was admitted for Severe sepsis [facility])  Renal Stone, UTI. S/p r stent, afeb, follow up cx neg    C/o stent pain on urination    Vitals:  Temp (24hrs), Av.7 °F (37.1 °C), Min:97.4 °F (36.3 °C), Max:99.4 °F (37.4 °C)     Blood pressure 165/81, pulse 81, temperature 99.2 °F (37.3 °C), resp. rate 20, height 5' 2\" (1.575 m), weight 127.6 kg (281 lb 4.9 oz), last menstrual period 2007, SpO2 95 %. I&O's:  10/04 1901 - 10/06 0700  In: -   Out: 5225 [Urine:5225]   10/06 0701 - 10/06 1900  In: -   Out: 900 [Urine:900]     Exam:   NAD.       Labs:   Recent Labs      10/04/17   0349   WBC  6.6   HGB  11.1*   HCT  33.7*   PLT  248     Recent Labs      10/06/17   0423  10/04/17   0349   NA  139  140   K  3.5  3.5   CL  105  105   CO2  30  30   GLU  93  91   BUN  7  10   CREA  0.95  0.99   CA  8.2*  8.5        Cultures:      Imaging:       Assessment:     - Principal Problem:    Severe sepsis ([facility] (2017)    Active Problems:    Depression (2010)      Overview: Sees Dr. Parry Lobe attempt in 2011      Hospitalized [facility] 2011 for depression      I would recommend that we not refill any psychoactive medicines      Hypercholesteremia (2010)      Tobacco abuse ()      Hypothyroid ()      JING (acute kidney injury) ([facility] (2017)      Hyponatremia (2017)      HTN (hypertension) (2017)        Plan:     - pyelo / stone  - ok to go home, cipro x 1 more week should be sufficient  - follow up next week  - pyridium or azo for stent symptoms    Signed By: Deja Houser MD - 2017

## 2017-10-06 NOTE — PROGRESS NOTES
Problem: Falls - Risk of  Goal: *Absence of Falls  Document Moises Fall Risk and appropriate interventions in the flowsheet.    Outcome: Progressing Towards Goal  Fall Risk Interventions:  Mobility Interventions: Bed/chair exit alarm, Patient to call before getting OOB     Mentation Interventions: Bed/chair exit alarm, Door open when patient unattended     Medication Interventions: Bed/chair exit alarm, Patient to call before getting OOB     Elimination Interventions: Bed/chair exit alarm, Call light in reach, Patient to call for help with toileting needs     History of Falls Interventions: Bed/chair exit alarm, Door open when patient unattended

## 2017-10-06 NOTE — PROGRESS NOTES
NUTRITION   RD Screen      Diet:  Regular  Skin: intact  PO Intake: 100%    Estimated Daily Nutrition Requirements:  Kcals:  2122 Kcals/day              Protein: 102 g (-128g/day(0.8-1.0g/kg))             Fluid:   2000 mL    Pt screened for nutrition risk d/t LOS. Pt is tolerating po diet well w/ adequate po intake and meeting estimated nutrition needs. Appetite is good. Energy expenditure is minimal. Pt states she doesn't do much at home for herself. Pt motivation for wt loss is poor due to depression.      Nutrition Diagnosis: obesity related to low energy expenditure as evidenced by reported low energy expenditure  Nutrition Intervention: diet education  Goal: weight loss of 10 lbs in 2 months  Monitoring and Evaluation: monitor energy expenditure and wt     RD will continue to monitor and will f/u for further nutrition assessment and intervention as appropriate    Education & Discharge Needs:   [] Nutrition related discharge needs addressed:    [] Supplements (on d/c instruction &/or coupons provided)    [x] Education - wt management    [] No nutrition related discharge needs at this time     Cultural, Tenriism and ethnic food preferences identified    [x]None   [] Yes     Teresa Conner  [phone]

## 2017-10-06 NOTE — PROGRESS NOTES
Problem: Falls - Risk of  Goal: *Absence of Falls  Document Moises Fall Risk and appropriate interventions in the flowsheet.    Outcome: Progressing Towards Goal  Fall Risk Interventions:  Mobility Interventions: Bed/chair exit alarm, Communicate number of staff needed for ambulation/transfer, OT consult for ADLs, Patient to call before getting OOB, PT Consult for mobility concerns, PT Consult for assist device competence, Strengthening exercises (ROM-active/passive), Utilize walker, cane, or other assitive device           Medication Interventions: Bed/chair exit alarm, Evaluate medications/consider consulting pharmacy, Patient to call before getting OOB, Teach patient to arise slowly     Elimination Interventions: Bed/chair exit alarm, Call light in reach, Patient to call for help with toileting needs, Toilet paper/wipes in reach, Toileting schedule/hourly rounds     History of Falls Interventions: Bed/chair exit alarm, Consult care management for discharge planning, Door open when patient unattended, Evaluate medications/consider consulting pharmacy, Investigate reason for fall, Room close to nurse's station, Utilize gait belt for transfer/ambulation

## 2017-10-06 NOTE — PROGRESS NOTES
Problem: Patient Education: Go to Patient Education Activity  Goal: Patient/Family Education  PHYSICAL THERAPY TREATMENT  Patient: Linda Santiago (87 y.o. female)  Date: 10/6/2017  Diagnosis: Severe sepsis ([facility]  Renal Stone, UTI Severe sepsis ([facility]  Procedure(s) (LRB):  RIGHT DOUBLE J STENT PLACEMENT (Right) 2 Days Post-Op  Precautions: Fall  Chart, physical therapy assessment, plan of care and goals were reviewed. ASSESSMENT:  Pt comes to sit with min to mod assist.Pt to stand with min to mod assist.Pts ambulated 28ft with RW min assist of 2. Pt is very unsteady at high risk for falls with poor foot strategy. Pt was assisted in restroom before sitting up in chair. Pt is not safe going home at current level of assist.Pt would benefit from SNF for rehab before going home. Progression toward goals:  [ ]      Improving appropriately and progressing toward goals  [X]      Improving slowly and progressing toward goals  [ ]      Not making progress toward goals and plan of care will be adjusted       PLAN:  Patient continues to benefit from skilled intervention to address the above impairments. Continue treatment per established plan of care. Discharge Recommendations:  Jaime Dickson  Further Equipment Recommendations for Discharge:  bedside commode and rolling walker       SUBJECTIVE:          OBJECTIVE DATA SUMMARY:   Critical Behavior:  Neurologic State: Alert  Orientation Level: Oriented X4  Cognition: Follows commands  Safety/Judgement: Decreased awareness of need for assistance, Decreased awareness of environment, Decreased awareness of need for safety, Decreased insight into deficits, Lack of insight into deficits  Functional Mobility Training:  Bed Mobility:     Supine to Sit: Minimum assistance; Moderate assistance                          Transfers:  Sit to Stand: Minimum assistance; Moderate assistance                                Balance:  Sitting: Intact  Standing: Intact  Ambulation/Gait Training:  Distance (ft): 28 Feet (ft)  Assistive Device: Walker, rolling;Gait belt  Ambulation - Level of Assistance: Minimal assistance;Assist x2        Gait Abnormalities: Decreased step clearance; Path deviations        Base of Support: Widened        Step Length: Left shortened;Right shortened                               Pain:  Pain Scale 1: Numeric (0 - 10)  Pain Intensity 1: 0              Activity Tolerance:   Pt tolerated treatment fairly well. Please refer to the flowsheet for vital signs taken during this treatment.   After treatment:   [X] Patient left in no apparent distress sitting up in chair  [ ] Patient left in no apparent distress in bed  [ ] Call bell left within reach  [ ] Nursing notified  [ ] Caregiver present  [ ] Bed alarm activated      COMMUNICATION/COLLABORATION:   The patients plan of care was discussed with: Physical Therapist     Jaya Varghese PTA   Time Calculation: 38 mins

## 2017-10-07 LAB
BACTERIA SPEC CULT: NORMAL
SERVICE CMNT-IMP: NORMAL

## 2017-10-07 PROCEDURE — 74011000258 HC RX REV CODE- 258: Performed by: INTERNAL MEDICINE

## 2017-10-07 PROCEDURE — 74011250636 HC RX REV CODE- 250/636: Performed by: INTERNAL MEDICINE

## 2017-10-07 PROCEDURE — 74011250637 HC RX REV CODE- 250/637: Performed by: INTERNAL MEDICINE

## 2017-10-07 PROCEDURE — 74011250637 HC RX REV CODE- 250/637: Performed by: PSYCHIATRY & NEUROLOGY

## 2017-10-07 PROCEDURE — 65660000000 HC RM CCU STEPDOWN

## 2017-10-07 RX ORDER — CIPROFLOXACIN 500 MG/1
500 TABLET ORAL EVERY 12 HOURS
Status: DISCONTINUED | OUTPATIENT
Start: 2017-10-07 | End: 2017-10-09

## 2017-10-07 RX ORDER — IBUPROFEN 200 MG
1 TABLET ORAL DAILY
Status: DISCONTINUED | OUTPATIENT
Start: 2017-10-07 | End: 2017-10-10 | Stop reason: HOSPADM

## 2017-10-07 RX ADMIN — CEFEPIME HYDROCHLORIDE 2 G: 2 INJECTION, POWDER, FOR SOLUTION INTRAVENOUS at 04:44

## 2017-10-07 RX ADMIN — LORAZEPAM 1 MG: 1 TABLET ORAL at 20:53

## 2017-10-07 RX ADMIN — SODIUM CHLORIDE AND POTASSIUM CHLORIDE: 9; 1.49 INJECTION, SOLUTION INTRAVENOUS at 08:44

## 2017-10-07 RX ADMIN — DEXTROAMPHETAMINE SACCHARATE, AMPHETAMINE ASPARTATE, DEXTROAMPHETAMINE SULFATE AND AMPHETAMINE SULFATE 15 MG: 2.5; 2.5; 2.5; 2.5 TABLET ORAL at 08:40

## 2017-10-07 RX ADMIN — CIPROFLOXACIN HYDROCHLORIDE 500 MG: 500 TABLET, FILM COATED ORAL at 09:36

## 2017-10-07 RX ADMIN — LEVOTHYROXINE SODIUM 112 MCG: 112 TABLET ORAL at 06:37

## 2017-10-07 RX ADMIN — VENLAFAXINE HYDROCHLORIDE 225 MG: 150 CAPSULE, EXTENDED RELEASE ORAL at 08:40

## 2017-10-07 RX ADMIN — SIMVASTATIN 10 MG: 5 TABLET, FILM COATED ORAL at 20:57

## 2017-10-07 RX ADMIN — LORAZEPAM 1 MG: 1 TABLET ORAL at 08:40

## 2017-10-07 RX ADMIN — DILTIAZEM HYDROCHLORIDE 180 MG: 180 CAPSULE, COATED, EXTENDED RELEASE ORAL at 08:40

## 2017-10-07 RX ADMIN — QUETIAPINE FUMARATE 50 MG: 25 TABLET ORAL at 20:56

## 2017-10-07 RX ADMIN — ENOXAPARIN SODIUM 40 MG: 40 INJECTION SUBCUTANEOUS at 20:54

## 2017-10-07 RX ADMIN — DEXTROAMPHETAMINE SACCHARATE, AMPHETAMINE ASPARTATE, DEXTROAMPHETAMINE SULFATE AND AMPHETAMINE SULFATE 15 MG: 2.5; 2.5; 2.5; 2.5 TABLET ORAL at 14:31

## 2017-10-07 RX ADMIN — ENOXAPARIN SODIUM 40 MG: 40 INJECTION SUBCUTANEOUS at 08:40

## 2017-10-07 RX ADMIN — CIPROFLOXACIN HYDROCHLORIDE 500 MG: 500 TABLET, FILM COATED ORAL at 20:53

## 2017-10-07 RX ADMIN — Medication 10 ML: at 20:56

## 2017-10-07 NOTE — PROGRESS NOTES
Problem: Falls - Risk of  Goal: *Absence of Falls  Document Moises Fall Risk and appropriate interventions in the flowsheet.    Outcome: Progressing Towards Goal  Fall Risk Interventions:  Mobility Interventions: Bed/chair exit alarm, Patient to call before getting OOB     Mentation Interventions: Door open when patient unattended, Bed/chair exit alarm     Medication Interventions: Bed/chair exit alarm, Patient to call before getting OOB     Elimination Interventions: Bed/chair exit alarm, Call light in reach, Patient to call for help with toileting needs     History of Falls Interventions: Bed/chair exit alarm, Door open when patient unattended

## 2017-10-07 NOTE — PROGRESS NOTES
10/7/2017 2:43 PM JESUS spoke with Dr. Danny Jean-Baptiste and informed him that this pt's insurance has not given auth for placement     10/7/2017 9:06 AM Discharge orders received. JESUS contacted Cory Carmelo with [facility] to inquire about auth. They have not received auth on this pt. Auth not anticipated this weekend.    Marcello López, BSW

## 2017-10-07 NOTE — ROUTINE PROCESS
Bedside and Verbal shift change report given to Eladia Gallardo RN (oncoming nurse) by MIL Lucio (offgoing nurse). Report included the following information SBAR and Kardex.

## 2017-10-07 NOTE — PROGRESS NOTES
Carlos Ryan [facility] 79  [address] [facility], [place], [address] [place] [address]  [phone]      Medical Progress Note      NAME: Jhonny Ball   :  1960  MRM:  [id number]    Date/Time: 10/7/2017  9:06 AM       Assessment and Plan:     Pyelonephritis / bacteremia - Pan-sensitive klebsiella. Continue cipro to complete abx course (QTc 438)     Nephrolithiasis - US showed large kidney stone with hydronephrosis. Urology following, sp stent placement 10/4. Pt advised to follow up with urology within one week. Pyridium.     Debilitated patient - Appreciate Pt/OT eval.  Needs SNF. CM working on that. Discharge delayed as patient initially refused appropriate discharge plan, then accepted only late on Friday, making it likely she will stay 3 extra days. Depression / Anxiety / ADHD - Severe. Prior hospitalization at [facility]. Remains depressed, tearful. Psych consulted, but is safe to discharge per their recs. They wrote to continue Celexa, Adderall, Seroquel, Effexor and Ativan. This issue is significantly affecting her rehab potential.    Super morbid obese - Advise weight loss. Needs outpatient ELMER testing. JING (acute kidney injury) / Hypokalemia / Hyponatremia - POA, mild, improved, can stop IVF     HTN (hypertension) - Controlled on diltiazem    Hypercholesteremia - Continue sinvastatin     Hypothyroid - TSH normal, continue synthroid     Hyperbilirubinemia / elevated alk phos - Normal RUQ US, likely from sepsis     Severe sepsis - POA, due to pyelonephritis, now resolved.     Tobacco abuse: smoking cessation advised       Subjective:     Chief Complaint:  Tearful, achy, weak    ROS:  (bold if positive, if negative)      Tolerating some PT  Tolerating Diet        Objective:     Last 24hrs VS reviewed since prior progress note.  Most recent are:    Visit Vitals    /77 (BP 1 Location: Right arm, BP Patient Position: At rest)    Pulse 82    Temp 98.7 °F (37.1 °C)    Resp 18    Ht 5' 2\" (1.575 m)    Wt 125.9 kg (277 lb 8 oz)    SpO2 92%    BMI 50.76 kg/m2     SpO2 Readings from Last 6 Encounters:   10/07/17 92%   09/28/17 93%   06/08/17 95%   04/18/17 97%   03/16/17 96%   01/20/17 96%    O2 Flow Rate (L/min): 2 l/min     Intake/Output Summary (Last 24 hours) at 10/07/17 0906  Last data filed at 10/06/17 1200   Gross per 24 hour   Intake                0 ml   Output             1000 ml   Net            -1000 ml        Physical Exam:    Gen:  Super morbid obese, in no acute distress  HEENT:  Pink conjunctivae, PERRL, hearing intact to voice, moist mucous membranes  Neck:  Supple, without masses, thyroid non-tender  Resp:  No accessory muscle use, distant breath sounds without wheezes rales or rhonchi  Card:  No murmurs, distant S1, S2 without thrills, bruits, 1+ peripheral edema  Abd:  Soft, non-tender, non-distended, distant bowel sounds are present, no mass  Lymph:  No cervical or inguinal adenopathy  Musc:  No cyanosis or clubbing  Skin:  No rashes or ulcers, skin turgor is good  Neuro:  Cranial nerves are grossly intact, general motor weakness, follows commands vaguely  Psych:  Poor insight, oriented to person, place and time, tearful    Telemetry reviewed:   normal sinus rhythm  __________________________________________________________________  Medications Reviewed: (see below)  Medications:     Current Facility-Administered Medications   Medication Dose Route Frequency    ciprofloxacin HCl (CIPRO) tablet 500 mg  500 mg Oral Q12H    phenazopyridine (PYRIDIUM) tablet 100 mg  100 mg Oral TID PRN    lidocaine (PF) (XYLOCAINE) 10 mg/mL (1 %) injection 0.1 mL  0.1 mL SubCUTAneous PRN    sodium chloride (NS) flush 5-10 mL  5-10 mL IntraVENous Q8H    sodium chloride (NS) flush 5-10 mL  5-10 mL IntraVENous PRN    sodium chloride (NS) flush 5-10 mL  5-10 mL IntraVENous Q8H    sodium chloride (NS) flush 5-10 mL  5-10 mL IntraVENous PRN    morphine injection 2 mg  2 mg IntraVENous Q4H PRN    venlafaxine-SR (EFFEXOR-XR) capsule 225 mg  225 mg Oral DAILY WITH BREAKFAST    dextroamphetamine-amphetamine (ADDERALL) tablet 15 mg  15 mg Oral BID    QUEtiapine (SEROquel) tablet 50 mg  50 mg Oral QHS    0.9% sodium chloride with KCl 20 mEq/L infusion   IntraVENous CONTINUOUS    nicotine (NICODERM CQ) 21 mg/24 hr patch 1 Patch  1 Patch TransDERmal Q24H    sodium chloride (NS) flush 5-10 mL  5-10 mL IntraVENous PRN    sodium chloride (NS) flush 5-10 mL  5-10 mL IntraVENous Q8H    sodium chloride (NS) flush 5-10 mL  5-10 mL IntraVENous PRN    acetaminophen (TYLENOL) tablet 650 mg  650 mg Oral Q4H PRN    HYDROcodone-acetaminophen (NORCO) 5-325 mg per tablet 1 Tab  1 Tab Oral Q4H PRN    naloxone (NARCAN) injection 0.4 mg  0.4 mg IntraVENous PRN    ondansetron (ZOFRAN) injection 4 mg  4 mg IntraVENous Q4H PRN    enoxaparin (LOVENOX) injection 40 mg  40 mg SubCUTAneous Q12H    sodium chloride (NS) flush 5-10 mL  5-10 mL IntraVENous PRN    LORazepam (ATIVAN) tablet 1 mg  1 mg Oral BID    levothyroxine (SYNTHROID) tablet 112 mcg  112 mcg Oral ACB    dilTIAZem CD (CARDIZEM CD) capsule 180 mg  180 mg Oral DAILY    simvastatin (ZOCOR) tablet 10 mg  10 mg Oral QHS    albuterol (PROVENTIL VENTOLIN) nebulizer solution 2.5 mg  2.5 mg Nebulization Q4H PRN        Lab Data Reviewed: (see below)  Lab Review:     No results for input(s): WBC, HGB, HCT, PLT, HGBEXT, HCTEXT, PLTEXT in the last 72 hours. Recent Labs      10/06/17   0423   NA  139   K  3.5   CL  105   CO2  30   GLU  93   BUN  7   CREA  0.95   CA  8.2*     Lab Results   Component Value Date/Time    Glucose (POC) 107 08/18/2012 07:11 PM     No results for input(s): PH, PCO2, PO2, HCO3, FIO2 in the last 72 hours. No results for input(s): INR in the last 72 hours.     No lab exists for component: INREXT  All Micro Results     Procedure Component Value Units Date/Time    CULTURE, BLOOD [[id number]]  (Abnormal)  (Susceptibility) Collected:  09/30/17 3474 Order Status:  Completed Specimen:  Blood from Blood Updated:  10/06/17 0801     Special Requests: NO SPECIAL REQUESTS        Culture result:         KLEBSIELLA PNEUMONIAE GROWING IN 1 OF 2 BOTTLES DRAWN . ..(SITE= R HAND) (A)      PRELIMINARY RESULT OF GRAM NEGATIVE RODS  GROWING IN 1 OF 2 BOTTLES DRAWN  CALLED TO AND READ BACK BY  VINHRN AT 1231, 10/1/17 DB                REMAINING BOTTLE(S) HAS/HAVE NO GROWTH IN 5 DAYS    CULTURE, BLOOD, PAIRED [[id number]] Collected:  10/03/17 2323    Order Status:  Completed Specimen:  Blood Updated:  10/06/17 0718     Special Requests: NO SPECIAL REQUESTS        Culture result: NO GROWTH 2 DAYS       CULTURE, BLOOD, PAIRED [[id number]] Collected:  10/02/17 0451    Order Status:  Completed Specimen:  Blood Updated:  10/06/17 0718     Special Requests: NO SPECIAL REQUESTS        Culture result: NO GROWTH 4 DAYS       CULTURE, BLOOD [[id number]] Collected:  09/30/17 1622    Order Status:  Completed Specimen:  Blood from Blood Updated:  10/06/17 0717     Special Requests: NO SPECIAL REQUESTS        Culture result: NO GROWTH 6 DAYS       CULTURE, URINE [[id number]]  (Abnormal)  (Susceptibility) Collected:  09/30/17 1648    Order Status:  Completed Specimen:  Urine Updated:  10/02/17 1141     Special Requests: --        NO SPECIAL REQUESTS  Reflexed from [id number]       [place] Count --        [id number]  COLONIES/mL       Culture result: KLEBSIELLA PNEUMONIAE (A)             I have reviewed notes of prior 24hr.     Other pertinent lab: none    Total time spent with patient: 28 [address] discussed with: Patient, Nursing Staff, Consultant/Specialist and >50% of time spent in counseling and coordination of care    Discussed:  Care Plan and D/C Planning    Prophylaxis:  Lovenox and H2B/PPI    Disposition:  SNF/LTC           ___________________________________________________    Attending Physician: Ro Hampton MD

## 2017-10-07 NOTE — ROUTINE PROCESS
Bedside shift change report given to [address] [facility] (oncoming nurse) by [address] [facility] (offgoing nurse). Report included the following information SBAR, Kardex and MAR.

## 2017-10-08 PROCEDURE — 74011250637 HC RX REV CODE- 250/637: Performed by: INTERNAL MEDICINE

## 2017-10-08 PROCEDURE — 74011250637 HC RX REV CODE- 250/637: Performed by: PSYCHIATRY & NEUROLOGY

## 2017-10-08 PROCEDURE — 74011250636 HC RX REV CODE- 250/636: Performed by: INTERNAL MEDICINE

## 2017-10-08 PROCEDURE — 65270000029 HC RM PRIVATE

## 2017-10-08 RX ORDER — IBUPROFEN 200 MG
200 TABLET ORAL
Status: DISCONTINUED | OUTPATIENT
Start: 2017-10-08 | End: 2017-10-10 | Stop reason: HOSPADM

## 2017-10-08 RX ADMIN — LORAZEPAM 1 MG: 1 TABLET ORAL at 08:17

## 2017-10-08 RX ADMIN — DILTIAZEM HYDROCHLORIDE 180 MG: 180 CAPSULE, COATED, EXTENDED RELEASE ORAL at 08:17

## 2017-10-08 RX ADMIN — CIPROFLOXACIN HYDROCHLORIDE 500 MG: 500 TABLET, FILM COATED ORAL at 21:54

## 2017-10-08 RX ADMIN — ENOXAPARIN SODIUM 40 MG: 40 INJECTION SUBCUTANEOUS at 08:16

## 2017-10-08 RX ADMIN — CIPROFLOXACIN HYDROCHLORIDE 500 MG: 500 TABLET, FILM COATED ORAL at 08:17

## 2017-10-08 RX ADMIN — VENLAFAXINE HYDROCHLORIDE 225 MG: 150 CAPSULE, EXTENDED RELEASE ORAL at 08:17

## 2017-10-08 RX ADMIN — DEXTROAMPHETAMINE SACCHARATE, AMPHETAMINE ASPARTATE, DEXTROAMPHETAMINE SULFATE AND AMPHETAMINE SULFATE 15 MG: 2.5; 2.5; 2.5; 2.5 TABLET ORAL at 08:16

## 2017-10-08 RX ADMIN — ENOXAPARIN SODIUM 40 MG: 40 INJECTION SUBCUTANEOUS at 21:54

## 2017-10-08 RX ADMIN — SIMVASTATIN 10 MG: 5 TABLET, FILM COATED ORAL at 21:55

## 2017-10-08 RX ADMIN — LEVOTHYROXINE SODIUM 112 MCG: 112 TABLET ORAL at 06:28

## 2017-10-08 RX ADMIN — IBUPROFEN 200 MG: 200 TABLET, FILM COATED ORAL at 14:04

## 2017-10-08 RX ADMIN — QUETIAPINE FUMARATE 50 MG: 25 TABLET ORAL at 21:54

## 2017-10-08 RX ADMIN — Medication 10 ML: at 06:28

## 2017-10-08 RX ADMIN — DEXTROAMPHETAMINE SACCHARATE, AMPHETAMINE ASPARTATE, DEXTROAMPHETAMINE SULFATE AND AMPHETAMINE SULFATE 15 MG: 2.5; 2.5; 2.5; 2.5 TABLET ORAL at 14:04

## 2017-10-08 RX ADMIN — LORAZEPAM 1 MG: 1 TABLET ORAL at 21:54

## 2017-10-08 NOTE — PROGRESS NOTES
Carlos Ryan [facility] 79  [address] [facility], [place], [address] [place] [address]  [phone]      Medical Progress Note      NAME: Karen Guzman   :  1960  MRM:  [id number]    Date/Time: 10/8/2017  7:43 AM       Assessment and Plan:     Pyelonephritis / bacteremia - Pan-sensitive klebsiella. Continue cipro to complete abx course (QTc 438)     Nephrolithiasis - US showed large kidney stone with hydronephrosis. Urology following, sp stent placement 10/4. Pt advised to follow up with urology within one week. Pyridium.     Debilitated patient - Appreciate Pt/OT eval.  Needs SNF. CM working on that. Discharge delayed as patient initially refused appropriate discharge plan, then accepted only late on Friday, making it likely she will stay 3 extra days. Depression / Anxiety / ADHD - Severe. Prior hospitalization at [facility]. Remains depressed, tearful. Her depression clouds her understanding of her debilitated state. Psych consulted, but she is safe to discharge per their recs. They wrote to continue Adderall, Seroquel, Effexor and Ativan, but to stop low dose of Celexa. Depression is significantly affecting her rehab potential.    Super morbid obese - Advise weight loss. Needs outpatient ELMER testing. JING (acute kidney injury) / Hypokalemia / Hyponatremia - POA, mild, improved, can stop IVF     HTN (hypertension) - Controlled on diltiazem    Hypercholesteremia - Continue sinvastatin     Hypothyroid - TSH normal, continue synthroid     Hyperbilirubinemia / elevated alk phos - Normal RUQ US, likely from sepsis     Severe sepsis - POA, due to pyelonephritis, now resolved. Can stop tele.     Tobacco abuse: smoking cessation advised       Subjective:     Chief Complaint:  Tearful, achy, weak    ROS:  (bold if positive, if negative)      Tolerating some PT  Tolerating Diet        Objective:     Last 24hrs VS reviewed since prior progress note.  Most recent are:    Visit Vitals    /75 (BP 1 Location: Left arm, BP Patient Position: At rest)    Pulse 77    Temp 98.2 °F (36.8 °C)    Resp 18    Ht 5' 2\" (1.575 m)    Wt 125.9 kg (277 lb 8 oz)    SpO2 93%    BMI 50.76 kg/m2     SpO2 Readings from Last 6 Encounters:   10/08/17 93%   09/28/17 93%   06/08/17 95%   04/18/17 97%   03/16/17 96%   01/20/17 96%    O2 Flow Rate (L/min): 2 l/min       Intake/Output Summary (Last 24 hours) at 10/08/17 0700  Last data filed at 10/08/17 0408   Gross per 24 hour   Intake                0 ml   Output             1200 ml   Net            -1200 ml        Physical Exam:    Gen:  Super morbid obese, in no acute distress  HEENT:  Pink conjunctivae, PERRL, hearing intact to voice, moist mucous membranes  Neck:  Supple, without masses, thyroid non-tender  Resp:  No accessory muscle use, distant breath sounds without wheezes rales or rhonchi  Card:  No murmurs, distant S1, S2 without thrills, bruits, 1+ peripheral edema  Abd:  Soft, non-tender, non-distended, distant bowel sounds are present, no mass  Lymph:  No cervical or inguinal adenopathy  Musc:  No cyanosis or clubbing  Skin:  No rashes or ulcers, skin turgor is good  Neuro:  Cranial nerves are grossly intact, general motor weakness, follows commands vaguely  Psych:  Poor insight, oriented to person, place and time, tearful    Telemetry reviewed:   normal sinus rhythm  __________________________________________________________________  Medications Reviewed: (see below)  Medications:     Current Facility-Administered Medications   Medication Dose Route Frequency    ciprofloxacin HCl (CIPRO) tablet 500 mg  500 mg Oral Q12H    nicotine (NICODERM CQ) 14 mg/24 hr patch 1 Patch  1 Patch TransDERmal DAILY    phenazopyridine (PYRIDIUM) tablet 100 mg  100 mg Oral TID PRN    lidocaine (PF) (XYLOCAINE) 10 mg/mL (1 %) injection 0.1 mL  0.1 mL SubCUTAneous PRN    sodium chloride (NS) flush 5-10 mL  5-10 mL IntraVENous Q8H    sodium chloride (NS) flush 5-10 mL  5-10 mL IntraVENous PRN    morphine injection 2 mg  2 mg IntraVENous Q4H PRN    venlafaxine-SR (EFFEXOR-XR) capsule 225 mg  225 mg Oral DAILY WITH BREAKFAST    dextroamphetamine-amphetamine (ADDERALL) tablet 15 mg  15 mg Oral BID    QUEtiapine (SEROquel) tablet 50 mg  50 mg Oral QHS    acetaminophen (TYLENOL) tablet 650 mg  650 mg Oral Q4H PRN    HYDROcodone-acetaminophen (NORCO) 5-325 mg per tablet 1 Tab  1 Tab Oral Q4H PRN    naloxone (NARCAN) injection 0.4 mg  0.4 mg IntraVENous PRN    ondansetron (ZOFRAN) injection 4 mg  4 mg IntraVENous Q4H PRN    enoxaparin (LOVENOX) injection 40 mg  40 mg SubCUTAneous Q12H    LORazepam (ATIVAN) tablet 1 mg  1 mg Oral BID    levothyroxine (SYNTHROID) tablet 112 mcg  112 mcg Oral ACB    dilTIAZem CD (CARDIZEM CD) capsule 180 mg  180 mg Oral DAILY    simvastatin (ZOCOR) tablet 10 mg  10 mg Oral QHS    albuterol (PROVENTIL VENTOLIN) nebulizer solution 2.5 mg  2.5 mg Nebulization Q4H PRN        Lab Data Reviewed: (see below)  Lab Review:     No results for input(s): WBC, HGB, HCT, PLT, HGBEXT, HCTEXT, PLTEXT, HGBEXT, HCTEXT, PLTEXT in the last 72 hours. Recent Labs      10/06/17   0423   NA  139   K  3.5   CL  105   CO2  30   GLU  93   BUN  7   CREA  0.95   CA  8.2*     Lab Results   Component Value Date/Time    Glucose (POC) 107 08/18/2012 07:11 PM     No results for input(s): PH, PCO2, PO2, HCO3, FIO2 in the last 72 hours. No results for input(s): INR in the last 72 hours.     No lab exists for component: Essie Plum  All Micro Results     Procedure Component Value Units Date/Time    CULTURE, BLOOD, PAIRED [[id number]] Collected:  10/02/17 0451    Order Status:  Completed Specimen:  Blood Updated:  10/07/17 1010     Special Requests: NO SPECIAL REQUESTS        Culture result: NO GROWTH 5 DAYS       CULTURE, BLOOD, PAIRED [[id number]] Collected:  10/03/17 1098    Order Status:  Completed Specimen:  Blood Updated:  10/07/17 1010     Special Requests: NO SPECIAL REQUESTS Culture result: NO GROWTH 3 DAYS       CULTURE, BLOOD [[id number]]  (Abnormal)  (Susceptibility) Collected:  09/30/17 1622    Order Status:  Completed Specimen:  Blood from Blood Updated:  10/06/17 0801     Special Requests: NO SPECIAL REQUESTS        Culture result:         KLEBSIELLA PNEUMONIAE GROWING IN 1 OF 2 BOTTLES DRAWN . ..(SITE= R HAND) (A)      PRELIMINARY RESULT OF GRAM NEGATIVE RODS  GROWING IN 1 OF 2 BOTTLES DRAWN  CALLED TO AND READ BACK BY  VINHRN AT 1231, 10/1/17 DB                REMAINING BOTTLE(S) HAS/HAVE NO GROWTH IN 5 DAYS    CULTURE, BLOOD [[id number]] Collected:  09/30/17 1622    Order Status:  Completed Specimen:  Blood from Blood Updated:  10/06/17 0717     Special Requests: NO SPECIAL REQUESTS        Culture result: NO GROWTH 6 DAYS       CULTURE, URINE [[id number]]  (Abnormal)  (Susceptibility) Collected:  09/30/17 1648    Order Status:  Completed Specimen:  Urine Updated:  10/02/17 1141     Special Requests: --        NO SPECIAL REQUESTS  Reflexed from [id number]       [place] Count --        [address]  COLONIES/mL       Culture result: KLEBSIELLA PNEUMONIAE (A)             I have reviewed notes of prior 24hr.     Other pertinent lab: none    Total time spent with patient: [address] discussed with: Patient, Nursing Staff, Consultant/Specialist and >50% of time spent in counseling and coordination of care    Discussed:  Care Plan and D/C Planning    Prophylaxis:  Lovenox and H2B/PPI    Disposition:  SNF/LTC           ___________________________________________________    Attending Physician: Keerthi Dunn MD

## 2017-10-08 NOTE — PROGRESS NOTES
Problem: Falls - Risk of  Goal: *Absence of Falls  Document Moises Fall Risk and appropriate interventions in the flowsheet.    Outcome: Progressing Towards Goal  Fall Risk Interventions:  Mobility Interventions: Bed/chair exit alarm, Patient to call before getting OOB     Mentation Interventions: Bed/chair exit alarm, Door open when patient unattended     Medication Interventions: Bed/chair exit alarm, Patient to call before getting OOB     Elimination Interventions: Call light in reach, Bed/chair exit alarm, Patient to call for help with toileting needs     History of Falls Interventions: Bed/chair exit alarm, Door open when patient unattended

## 2017-10-08 NOTE — ROUTINE PROCESS
Bedside and Verbal shift change report given to [address] [place] [address] (oncoming nurse) by Ezequiel Cortés RN (offgoing nurse). Report included the following information SBAR, Kardex, MAR, Accordion and Recent Results.

## 2017-10-08 NOTE — ROUTINE PROCESS
Bedside shift change report given to Archana Millan (oncoming nurse) by Enma (offgoing nurse). Report included the following information SBAR, Kardex and MAR.

## 2017-10-09 LAB
BACTERIA SPEC CULT: NORMAL
SERVICE CMNT-IMP: NORMAL

## 2017-10-09 PROCEDURE — 74011250637 HC RX REV CODE- 250/637: Performed by: INTERNAL MEDICINE

## 2017-10-09 PROCEDURE — 97530 THERAPEUTIC ACTIVITIES: CPT

## 2017-10-09 PROCEDURE — 74011250637 HC RX REV CODE- 250/637: Performed by: PSYCHIATRY & NEUROLOGY

## 2017-10-09 PROCEDURE — 65270000029 HC RM PRIVATE

## 2017-10-09 PROCEDURE — 97116 GAIT TRAINING THERAPY: CPT

## 2017-10-09 PROCEDURE — 74011250636 HC RX REV CODE- 250/636: Performed by: INTERNAL MEDICINE

## 2017-10-09 PROCEDURE — 97535 SELF CARE MNGMENT TRAINING: CPT

## 2017-10-09 RX ORDER — IBUPROFEN 200 MG
1 TABLET ORAL DAILY
Qty: 14 PATCH | Refills: 0 | Status: SHIPPED | OUTPATIENT
Start: 2017-10-09 | End: 2017-10-13 | Stop reason: ALTCHOICE

## 2017-10-09 RX ORDER — IBUPROFEN 200 MG
200 TABLET ORAL
Qty: 10 TAB | Refills: 0 | Status: SHIPPED
Start: 2017-10-09 | End: 2017-10-13 | Stop reason: ALTCHOICE

## 2017-10-09 RX ORDER — DILTIAZEM HYDROCHLORIDE 240 MG/1
240 CAPSULE, COATED, EXTENDED RELEASE ORAL DAILY
Qty: 30 CAP | Refills: 2 | Status: SHIPPED | OUTPATIENT
Start: 2017-10-09 | End: 2017-12-19 | Stop reason: SDUPTHER

## 2017-10-09 RX ORDER — DILTIAZEM HYDROCHLORIDE 120 MG/1
240 CAPSULE, COATED, EXTENDED RELEASE ORAL DAILY
Status: DISCONTINUED | OUTPATIENT
Start: 2017-10-09 | End: 2017-10-10 | Stop reason: HOSPADM

## 2017-10-09 RX ADMIN — ENOXAPARIN SODIUM 40 MG: 40 INJECTION SUBCUTANEOUS at 08:52

## 2017-10-09 RX ADMIN — VENLAFAXINE HYDROCHLORIDE 225 MG: 150 CAPSULE, EXTENDED RELEASE ORAL at 08:53

## 2017-10-09 RX ADMIN — QUETIAPINE FUMARATE 50 MG: 25 TABLET ORAL at 21:39

## 2017-10-09 RX ADMIN — LORAZEPAM 1 MG: 1 TABLET ORAL at 08:53

## 2017-10-09 RX ADMIN — LORAZEPAM 1 MG: 1 TABLET ORAL at 20:06

## 2017-10-09 RX ADMIN — DILTIAZEM HYDROCHLORIDE 240 MG: 120 CAPSULE, COATED, EXTENDED RELEASE ORAL at 08:53

## 2017-10-09 RX ADMIN — DEXTROAMPHETAMINE SACCHARATE, AMPHETAMINE ASPARTATE, DEXTROAMPHETAMINE SULFATE AND AMPHETAMINE SULFATE 15 MG: 2.5; 2.5; 2.5; 2.5 TABLET ORAL at 11:05

## 2017-10-09 RX ADMIN — DEXTROAMPHETAMINE SACCHARATE, AMPHETAMINE ASPARTATE, DEXTROAMPHETAMINE SULFATE AND AMPHETAMINE SULFATE 15 MG: 2.5; 2.5; 2.5; 2.5 TABLET ORAL at 15:18

## 2017-10-09 RX ADMIN — ENOXAPARIN SODIUM 40 MG: 40 INJECTION SUBCUTANEOUS at 20:06

## 2017-10-09 RX ADMIN — LEVOTHYROXINE SODIUM 112 MCG: 112 TABLET ORAL at 07:05

## 2017-10-09 RX ADMIN — SIMVASTATIN 10 MG: 5 TABLET, FILM COATED ORAL at 21:39

## 2017-10-09 NOTE — PROGRESS NOTES
Bedside and Verbal shift change report given to Juan Jose Todd RN (oncoming nurse) by Obinna Biggs RN (offgoing nurse). Report included the following information SBAR and Kardex.

## 2017-10-09 NOTE — PROGRESS NOTES
Problem: Self Care Deficits Care Plan (Adult)  Goal: *Acute Goals and Plan of Care (Insert Text)  Occupational Therapy Goals  Initiated 10/2/2017  1. Patient will maintain attention to functional task >or = 5 minutes with < or = 3 verbal cues within 7 day(s). - progressing  2. Patient will perform upper body dressing with supervision/set-up within 7 day(s). Met   3. Patient will stand with one hand support > or = 3 mintues with minimal assistance/contact guard assist within 7 day(s). -met  4. Patient will perform toilet transfers with minimal assistance/contact guard assist for stand pivot transfer within 7 day(s). - met  5. Patient will perform all aspects of toileting with moderate assistance within 7 day(s). - met  6. Patient will participate in upper extremity therapeutic exercise/activities with supervision/set-up for 10 minutes within 7 day(s). - progressing   OCCUPATIONAL THERAPY TREATMENT: WEEKLY REASSESSMENT  Patient: Wesley Mcintyre (83 y.o. female)  Date: 10/9/2017  Diagnosis: Severe sepsis ([address]  Renal Stone, UTI Severe sepsis ([address]  Procedure(s) (LRB):  RIGHT DOUBLE J STENT PLACEMENT (Right) 5 Days Post-Op  Precautions: Fall      ASSESSMENT:  Patient received supine in bed. Patient initially declines OOB activity but agreeable with encouragement of therapist and nurse. Patient assisted with hygiene as patient with large episode of urine incontinence. Patient able to remove shirt with supervision and down new gown with supervision. Patient able to perform bed mobility with CGA and transfer to commode in bathroom with CGA. Upon sitting she is able to manage own hygiene in sitting. Upon standing  She is able to walk to since with CGA and stands for hand hygiene and grooming tasks with supervision. Patient able to return to supine without physical assistance. Patient requires assist mostly for attention to task and redirection . Patient is very emotionally labile and tearful during session.   She has been accepted to rehab facility and plans to discharge when insurance authorization received. Progression toward goals:  [ ]            Improving appropriately and progressing toward goals  [ ]            Improving slowly and progressing toward goals  [ ]            Not making progress toward goals and plan of care will be adjusted       PLAN:  Goals have been updated based on progression since last assessment. Patient continues to benefit from skilled intervention to address the above impairments. Continue to follow patient 3 times a week to address goals. Planned Interventions:  [X]                    Self Care Training                  [X]             Therapeutic Activities  [X]                    Functional Mobility Training    [ ]             Cognitive Retraining  [X]                    Therapeutic Exercises           [X]             Endurance Activities  [X]                    Balance Training                   [ ]             Neuromuscular Re-Education  [ ]                    Visual/Perceptual Training     [X]        Home Safety Training  [X]                    Patient Education                 [X]             Family Training/Education  [ ]                    Other (comment):  Discharge Recommendations: Skilled Nursing Facility  Further Equipment Recommendations for Discharge: TBD       SUBJECTIVE:   Patient stated I can do it, but whats the point? Lemuel Roman      OBJECTIVE DATA SUMMARY:   Cognitive/Behavioral Status:  Neurologic State: Alert  Orientation Level: Oriented X4  Cognition: Follows commands  Perception: Appears intact  Perseveration: Perseverates during conversation        Functional Mobility and Transfers for ADLs:  Bed Mobility:  Rolling: Independent  Supine to Sit: Contact guard assistance  Sit to Supine: Contact guard assistance  Scooting: Independent     Transfers:  Sit to Stand: Stand-by asssistance  Functional Transfers  Toilet Transfer : Contact guard assistance  Cues: Verbal cues provided Balance:  Sitting: Intact  Standing: Impaired; Without support  Standing - Static: Good  Standing - Dynamic : Fair     ADL Intervention:        Grooming  Grooming Assistance: Supervision/set up  Washing Face: Supervision/set-up  Washing Hands: Supervision/set-up  Brushing Teeth: Supervision/set-up                 Upper Body Dressing Assistance  Dressing Assistance: Supervision/set-up (cecile shirt, don gowtessa)  [facility] Gown: Supervision/ set-up  Pullover Shirt: Supervision/set-up                       Neuro Re-Education:           Therapeutic Exercises:      Pain:  Pain Scale 1: Numeric (0 - 10)  Pain Intensity 1: 0              Activity Tolerance:   VSS  Please refer to the flowsheet for vital signs taken during this treatment.   After treatment:   [ ] Patient left in no apparent distress sitting up in chair  [X] Patient left in no apparent distress in bed  [X] Call bell left within reach  [X] Nursing notified  [X] Caregiver present  [X] Bed alarm activated      COMMUNICATION/COLLABORATION:   The patients plan of care was discussed with: Physical Therapist and Registered Nurse     Lachelle Skinner OTR/L  Time Calculation: 38 mins

## 2017-10-09 NOTE — PROGRESS NOTES
Pharmacist Discharge Medication Reconciliation  (updated discharge med rec with changes)    Discharge Provider:  Dr. Homero Dance       Discharge Medications:      Current Discharge Medication List        START taking these medications         Dose & Instructions Dispensing Information Comments   Morning Noon Evening Bedtime      ibuprofen 200 mg tablet   Commonly known as:  MOTRIN       Your last dose was: Your next dose is:              Dose:  200 mg   Take 1 Tab by mouth every six (6) hours as needed. Quantity:  10 Tab   Refills:  0                         nicotine 14 mg/24 hr patch   Commonly known as:  Sol [place]       Your last dose was: Your next dose is:              Dose:  1 Patch   1 Patch by TransDERmal route daily for 14 days. Quantity:  14 Patch   Refills:  0                               CONTINUE these medications which have CHANGED         Dose & Instructions Dispensing Information Comments   Morning Noon Evening Bedtime      dextroamphetamine-amphetamine 15 mg tablet   Commonly known as:  ADDERALL   What changed:    - when to take this  - reasons to take this       Your last dose was: Your next dose is:              Dose:  15 mg   Take 1 Tab (15 mg total) by mouth two (2) times a day. Max Daily Amount: 30 mg    Quantity:  14 Tab   Refills:  0                         dilTIAZem  mg ER capsule   Commonly known as:  CARDIZEM CD   What changed:    - medication strength  - how much to take       Your last dose was: Your next dose is:              Dose:  240 mg   Take 1 Cap by mouth daily for 90 days. Indications: hypertension    Quantity:  30 Cap   Refills:  2                         QUEtiapine 25 mg tablet   Commonly known as:  SEROquel   What changed:  how much to take       Your last dose was: Your next dose is:              Dose:  50 mg   Take 2 Tabs by mouth nightly as needed (insomnia, anxiety).     Quantity:  14 Tab   Refills:  0 venlafaxine-SR 75 mg capsule   Commonly known as:  EFFEXOR-XR   What changed:    - medication strength  - how much to take  - how to take this  - when to take this  - additional instructions       Your last dose was: Your next dose is:              Dose:  225 mg   Take 3 Caps by mouth daily (with breakfast) for 14 days. Quantity:  42 Cap   Refills:  0                         VITAMIN D3 2,000 unit Tab   Generic drug:  cholecalciferol (vitamin D3)   What changed:  Another medication with the same name was removed. Continue taking this medication, and follow the directions you see here. Your last dose was: Your next dose is:              Dose:  2000 Units   Take 2,000 Units by mouth five (5) days a week. The patient takes 2000 units Monday, Tuesday, Wednesday, Thursday, Friday    Refills:  0                               CONTINUE these medications which have NOT CHANGED         Dose & Instructions Dispensing Information Comments   Morning Noon Evening Bedtime      LORazepam 1 mg tablet   Commonly known as:  ATIVAN       Your last dose was: Your next dose is:              Dose:  1 mg   Take 1 mg by mouth two (2) times a day. Refills:  0                         phenazopyridine 100 mg tablet   Commonly known as:  PYRIDIUM       Your last dose was: Your next dose is:              Dose:  100 mg   Take 1 Tab by mouth three (3) times daily as needed for Pain for up to 3 days. Caution can cause orange staining on clothes from sweating, tears, urine    Quantity:  9 Tab   Refills:  0                         simvastatin 10 mg tablet   Commonly known as:  ZOCOR       Your last dose was: Your next dose is:              TAKE 1 TABLET BY MOUTH NIGHTLY    Quantity:  90 Tab   Refills:  0                         SYNTHROID 112 mcg tablet   Generic drug:  levothyroxine       Your last dose was:          Your next dose is:              Dose:  112 mcg   Take 112 mcg by mouth Daily (before breakfast). Refills:  0                               STOP taking these medications              citalopram 20 mg tablet   Commonly known as:  CELEXA           nitrofurantoin (macrocrystal-monohydrate) 100 mg capsule   Commonly known as:  MACROBID                    Where to Get Your Medications        Information on where to get these meds will be given to you by the nurse or doctor. !  Ask your nurse or doctor about these medications     dextroamphetamine-amphetamine 15 mg tablet    dilTIAZem  mg ER capsule    ibuprofen 200 mg tablet    nicotine 14 mg/24 hr patch    phenazopyridine 100 mg tablet    QUEtiapine 25 mg tablet    venlafaxine-SR 75 mg capsule               The patient's chart, MAR, and AVS were reviewed by   Josette Bear, [address],   Contact: [phone]

## 2017-10-09 NOTE — PROGRESS NOTES
[facility]  [address] [facility], [address] [place] [address]  [phone]      Medical Progress Note      NAME: Ivon Vee   :  1960  MRM:  [id number]    Date/Time: 10/9/2017  7:43 AM       Assessment and Plan:     Pyelonephritis / bacteremia - Pan-sensitive klebsiella. Continued cipro and completed 10d abx course (QTc 438)     Nephrolithiasis - US showed large kidney stone with hydronephrosis. Urology following, sp stent placement 10/4. Pt advised to follow up with urology next week. Pyridium.     Debilitated patient - Appreciate Pt/OT eval.  Needs SNF. CM working on that. Discharge delayed as patient initially refused appropriate discharge plan, then accepted only late on Friday, so she stayed 3 extra days over the weekend. Depression / Anxiety / ADHD - Severe. Prior hospitalization at [facility], [place]. Remains depressed, tearful. Her depression clouds her understanding of her debilitated state. Psych consulted, but she is safe to discharge per their recs. They wrote to continue Adderall, Seroquel, Effexor and Ativan, but to stop her low dose of Celexa. Depression is significantly affecting her rehab potential.    Super morbid obese - Advise weight loss. Needs outpatient ELMER testing. JING (acute kidney injury) / Hypokalemia / Hyponatremia - POA, mild, improved, can stop IVF     HTN (hypertension) - Controlled on diltiazem    Hypercholesteremia - Continue sinvastatin     Hypothyroid - TSH normal, continue synthroid     Hyperbilirubinemia / elevated alk phos - Normal RUQ US, likely from sepsis     Severe sepsis - POA, due to pyelonephritis, now resolved. Can stop tele.     Tobacco abuse: smoking cessation advised       Subjective:     Chief Complaint:  Tearful, achy, weak    ROS:  (bold if positive, if negative)      Tolerating some PT  Tolerating Diet        Objective:     Last 24hrs VS reviewed since prior progress note.  Most recent are:    Visit Vitals    BP (!) 174/91 (BP 1 Location: Left arm, BP Patient Position: At rest)    Pulse 79    Temp 98.6 °F (37 °C)    Resp 17    Ht 5' 2\" (1.575 m)    Wt 125.9 kg (277 lb 8 oz)    SpO2 94%    BMI 50.76 kg/m2     SpO2 Readings from Last 6 Encounters:   10/09/17 94%   09/28/17 93%   06/08/17 95%   04/18/17 97%   03/16/17 96%   01/20/17 96%    O2 Flow Rate (L/min): 2 l/min       Intake/Output Summary (Last 24 hours) at 10/09/17 0757  Last data filed at 10/09/17 0025   Gross per 24 hour   Intake                0 ml   Output             1600 ml   Net            -1600 ml        Physical Exam:    Gen:  Super morbid obese, in no acute distress  HEENT:  Pink conjunctivae, PERRL, hearing intact to voice, moist mucous membranes  Neck:  Supple, without masses, thyroid non-tender  Resp:  No accessory muscle use, distant breath sounds without wheezes rales or rhonchi  Card:  No murmurs, distant S1, S2 without thrills, bruits, 1+ peripheral edema  Abd:  Soft, non-tender, non-distended, distant bowel sounds are present, no mass  Lymph:  No cervical or inguinal adenopathy  Musc:  No cyanosis or clubbing  Skin:  No rashes or ulcers, skin turgor is good  Neuro:  Cranial nerves are grossly intact, general motor weakness, follows commands vaguely  Psych:  Poor insight, oriented to person, place and time, tearful    Telemetry reviewed:   normal sinus rhythm  __________________________________________________________________  Medications Reviewed: (see below)  Medications:     Current Facility-Administered Medications   Medication Dose Route Frequency    ibuprofen (MOTRIN) tablet 200 mg  200 mg Oral Q6H PRN    ciprofloxacin HCl (CIPRO) tablet 500 mg  500 mg Oral Q12H    nicotine (NICODERM CQ) 14 mg/24 hr patch 1 Patch  1 Patch TransDERmal DAILY    phenazopyridine (PYRIDIUM) tablet 100 mg  100 mg Oral TID PRN    lidocaine (PF) (XYLOCAINE) 10 mg/mL (1 %) injection 0.1 mL  0.1 mL SubCUTAneous PRN    sodium chloride (NS) flush 5-10 mL  5-10 mL IntraVENous Q8H    sodium chloride (NS) flush 5-10 mL  5-10 mL IntraVENous PRN    venlafaxine-SR (EFFEXOR-XR) capsule 225 mg  225 mg Oral DAILY WITH BREAKFAST    dextroamphetamine-amphetamine (ADDERALL) tablet 15 mg  15 mg Oral BID    QUEtiapine (SEROquel) tablet 50 mg  50 mg Oral QHS    acetaminophen (TYLENOL) tablet 650 mg  650 mg Oral Q4H PRN    HYDROcodone-acetaminophen (NORCO) 5-325 mg per tablet 1 Tab  1 Tab Oral Q4H PRN    naloxone (NARCAN) injection 0.4 mg  0.4 mg IntraVENous PRN    ondansetron (ZOFRAN) injection 4 mg  4 mg IntraVENous Q4H PRN    enoxaparin (LOVENOX) injection 40 mg  40 mg SubCUTAneous Q12H    LORazepam (ATIVAN) tablet 1 mg  1 mg Oral BID    levothyroxine (SYNTHROID) tablet 112 mcg  112 mcg Oral ACB    dilTIAZem CD (CARDIZEM CD) capsule 180 mg  180 mg Oral DAILY    simvastatin (ZOCOR) tablet 10 mg  10 mg Oral QHS    albuterol (PROVENTIL VENTOLIN) nebulizer solution 2.5 mg  2.5 mg Nebulization Q4H PRN        Lab Data Reviewed: (see below)  Lab Review:     No results for input(s): WBC, HGB, HCT, PLT, HGBEXT, HCTEXT, PLTEXT, HGBEXT, HCTEXT, PLTEXT in the last 72 hours. No results for input(s): NA, K, CL, CO2, GLU, BUN, CREA, CA, MG, PHOS, ALB, TBIL, TBILI, SGOT, ALT, INR in the last 72 hours. No lab exists for component: INREXT, INREXT  Lab Results   Component Value Date/Time    Glucose (POC) 107 08/18/2012 07:11 PM     No results for input(s): PH, PCO2, PO2, HCO3, FIO2 in the last 72 hours. No results for input(s): INR in the last 72 hours.     No lab exists for component: [place]  All Micro Results     Procedure Component Value Units Date/Time    CULTURE, BLOOD, PAIRED [[id number]] Collected:  10/03/17 5924    Order Status:  Completed Specimen:  Blood Updated:  10/09/17 0786     Special Requests: NO SPECIAL REQUESTS        Culture result: NO GROWTH 5 DAYS       CULTURE, BLOOD, PAIRED [[id number]] Collected:  10/02/17 0451    Order Status:  Completed Specimen:  Blood Updated:  10/07/17 1010     Special Requests: NO SPECIAL REQUESTS        Culture result: NO GROWTH 5 DAYS       CULTURE, BLOOD [[id number]]  (Abnormal)  (Susceptibility) Collected:  09/30/17 1622    Order Status:  Completed Specimen:  Blood from Blood Updated:  10/06/17 0801     Special Requests: NO SPECIAL REQUESTS        Culture result:         KLEBSIELLA PNEUMONIAE GROWING IN 1 OF 2 BOTTLES DRAWN . ..(SITE= R HAND) (A)      PRELIMINARY RESULT OF GRAM NEGATIVE RODS  GROWING IN 1 OF 2 BOTTLES DRAWN  CALLED TO AND READ BACK BY  MIL JUSTICE AT 1231, 10/1/17 DB                REMAINING BOTTLE(S) HAS/HAVE NO GROWTH IN 5 DAYS    CULTURE, BLOOD [[id number]] Collected:  09/30/17 1622    Order Status:  Completed Specimen:  Blood from Blood Updated:  10/06/17 0717     Special Requests: NO SPECIAL REQUESTS        Culture result: NO GROWTH 6 DAYS       CULTURE, URINE [[id number]]  (Abnormal)  (Susceptibility) Collected:  09/30/17 1648    Order Status:  Completed Specimen:  Urine Updated:  10/02/17 1141     Special Requests: --        NO SPECIAL REQUESTS  Reflexed from [id number]       [place] Count --        17819  COLONIES/mL       Culture result: KLEBSIELLA PNEUMONIAE (A)             I have reviewed notes of prior 24hr.     Other pertinent lab: none    Total time spent with patient: 28 [address] [facility] discussed with: Patient, Nursing Staff, Consultant/Specialist and >50% of time spent in counseling and coordination of care    Discussed:  Care Plan and D/C Planning    Prophylaxis:  Lovenox and H2B/PPI    Disposition:  SNF/LTC           ___________________________________________________    Attending Physician: Jose Herrera MD

## 2017-10-09 NOTE — DISCHARGE SUMMARY
Physician Discharge Summary     Patient ID:  Gaby Mcneil  [id number]  56 y.o.  1960    Admit date: 9/30/2017    Discharge date and time: 10/10/2017    Admission Diagnoses: Severe sepsis ([facility]  Renal Stone, UTI    Discharge Diagnoses:    Principal Diagnosis    Pyelonephritis                                             Other Diagnoses    Severe sepsis ([facility] (9/30/2017)    Depression (8/19/2010)    Hypercholesteremia (8/19/2010)    Tobacco abuse ()    Hypothyroid ()    JING (acute kidney injury) ([facility] (9/30/2017)    Hyponatremia (9/30/2017)    HTN (hypertension) (9/30/2017)     Hospital Course:   Pyelonephritis / bacteremia - Pan-sensitive klebsiella. Continued cipro and completed 10d abx course (QTc 438)      Nephrolithiasis - US showed large kidney stone with hydronephrosis. Urology following, sp stent placement 10/4. Pt advised to follow up with urology this week, from SNF, CM to help arrange. Continue Pyridium.      Debilitated patient - Appreciate PT/OT eval.  I think she needs SNF. CM working on that, but I now hear that her insurance has denied her SNF. Discharge delayed as patient initially refused appropriate discharge plan, then accepted only late on Friday, so she stayed 3 extra days over the weekend waiting to hear from insurance. We will now wet up [facility]. Depression / Anxiety / ADHD - Severe. Prior hospitalization at [facility]. Remains depressed, though less tearful. Her depression clouds her understanding of her debilitated state. Psych consulted, but she is safe to discharge per their recs. They wrote to continue Adderall, Seroquel, Effexor and Ativan, but to stop her low dose of Celexa. Depression is significantly affecting her rehab potential.     Super morbid obese - Advise weight loss.  Needs outpatient ELMER testing.     JING (acute kidney injury) / Hypokalemia / Hyponatremia - POA, mild, improved, no longer needing IVF      HTN (hypertension) - Controlled on diltiazem     Hypercholesteremia - Continue sinvastatin      Hypothyroid - TSH normal, continue synthroid      Hyperbilirubinemia / elevated alk phos - Normal RUQ US, likely from sepsis      Severe sepsis - POA, due to pyelonephritis, now resolved. Can stop tele.      Tobacco abuse: smoking cessation advised      PCP: Yamila Velazquez MD    Consults: Psychiatry and Urology    Significant Diagnostic Studies: See Hospital Course    Discharged home with [facility] in improved condition. Discharge Exam:   /81 (BP 1 Location: Left arm, BP Patient Position: At rest)    Pulse 77    Temp 98.4 °F (36.9 °C)    Resp 19    Ht 5' 2\" (1.575 m)    Wt 125.9 kg (277 lb 8 oz)    SpO2 92%    BMI 50.76 kg/m2      Gen:  Super morbid obese, in no acute distress  HEENT:  Pink conjunctivae, PERRL, hearing intact to voice, moist mucous membranes  Neck:  Supple, without masses, thyroid non-tender  Resp:  No accessory muscle use, distant breath sounds without wheezes rales or rhonchi  Card:  No murmurs, distant S1, S2 without thrills, bruits, 1+ peripheral edema  Abd:  Soft, non-tender, non-distended, distant bowel sounds are present, no mass  Lymph:  No cervical or inguinal adenopathy  Musc:  No cyanosis or clubbing  Skin:  No rashes or ulcers, skin turgor is good  Neuro:  Cranial nerves are grossly intact, general motor weakness, follows commands vaguely  Psych:  Poor insight, oriented to person, place and time, anxious    Patient Instructions:   Current Discharge Medication List      START taking these medications    Details   nicotine (NICODERM CQ) 14 mg/24 hr patch 1 Patch by TransDERmal route daily for 14 days. Qty: 14 Patch, Refills: 0      ibuprofen (MOTRIN) 200 mg tablet Take 1 Tab by mouth every six (6) hours as needed. Qty: 10 Tab, Refills: 0         CONTINUE these medications which have CHANGED    Details   dilTIAZem CD (CARDIZEM CD) 240 mg ER capsule Take 1 Cap by mouth daily for 90 days.  Indications: hypertension  Qty: 30 Cap, Refills: 2      dextroamphetamine-amphetamine (ADDERALL) 15 mg tablet Take 1 Tab (15 mg total) by mouth two (2) times a day. Max Daily Amount: 30 mg  Qty: 14 Tab, Refills: 0      phenazopyridine (PYRIDIUM) 100 mg tablet Take 1 Tab by mouth three (3) times daily as needed for Pain for up to 3 days. Caution can cause orange staining on clothes from sweating, tears, urine  Qty: 9 Tab, Refills: 0      venlafaxine-SR (EFFEXOR-XR) 75 mg capsule Take 3 Caps by mouth daily (with breakfast) for 14 days. Qty: 42 Cap, Refills: 0      QUEtiapine (SEROQUEL) 25 mg tablet Take 2 Tabs by mouth nightly as needed (insomnia, anxiety). Qty: 14 Tab, Refills: 0         CONTINUE these medications which have NOT CHANGED    Details      LORazepam (ATIVAN) 1 mg tablet Take 1 mg by mouth two (2) times a day. simvastatin (ZOCOR) 10 mg tablet TAKE 1 TABLET BY MOUTH NIGHTLY  Qty: 90 Tab, Refills: 0    Associated Diagnoses: Hypercholesteremia      cholecalciferol, vitamin D3, (VITAMIN D3) 2,000 unit tab Take 2,000 Units by mouth five (5) days a week. The patient takes 2000 units Monday, Tuesday, Wednesday, Thursday, Friday      levothyroxine (SYNTHROID) 112 mcg tablet Take 112 mcg by mouth Daily (before breakfast).          STOP taking these medications       nitrofurantoin, macrocrystal-monohydrate, (MACROBID) 100 mg capsule Comments:   Reason for Stopping:         citalopram (CELEXA) 20 mg tablet Comments:   Reason for Stopping:             Activity: Activity as tolerated and PT/OT Eval and Treat  Diet: Cardiac Diet and Low fat, Low cholesterol  Wound Care: As directed    Follow-up with Dr Ashleigh Badillo in Urology this week    Signed:  Marisol Reyes MD  10/10/2017  9:24 AM

## 2017-10-09 NOTE — PROGRESS NOTES
Problem: Mobility Impaired (Adult and Pediatric)  Goal: *Acute Goals and Plan of Care (Insert Text)  Physical Therapy Goals  Initiated 10/2/2017; continue same goals as of 10/9/2017    1. Patient will move from supine to sit and sit to supine in bed with independence within 7 day(s). 2. Patient will transfer from bed to chair and chair to bed with supervision/set-up using the least restrictive device within 7 day(s). 3. Patient will perform sit to stand with supervision/set-up within 7 day(s). 4. Patient will ambulate with supervision/set-up for 25 feet with the least restrictive device within 7 day(s). 5. Patient will ascend/descend 3 stairs with 1 handrail(s) with minimal assistance/contact guard assist within 7 day(s). PHYSICAL THERAPY TREATMENT: WEEKLY REASSESSMENT  Patient: Ashwini Jones (58 y.o. female)  Date: 10/9/2017  Diagnosis: Severe sepsis ([address]  Renal Stone, UTI Severe sepsis ([address]  Procedure(s) (LRB):  RIGHT DOUBLE J STENT PLACEMENT (Right) 5 Days Post-Op  Precautions: Fall      ASSESSMENT:  Based on the objective data described below, patient tolerated treatment very well. Supine to sit CGA, sit to stand SBA, ambulate without assistive device to and from the bathroom SBA, no loss of balance. Patient has a  labile affect. Nurse in the room assisting to convince patient to participate with therapy which patient finally did after few episodes of crying and being angry. Offered to be OOB to chair patient just want to go back to bed. Activated bed alarm and notified nurse who agreed to monitor patient. Patient's progression toward goals since last assessment: good. PLAN:  Goals have been updated based on progression since last assessment. Patient continues to benefit from skilled intervention to address the above impairments. Continue to follow the patient 5 times a week to address goals.   Planned Interventions:  [X]              Bed Mobility Training             [ ]       Neuromuscular Re-Education  [X]              Transfer Training                   [ ]       Orthotic/Prosthetic Training  [X]              Gait Training                         [ ]       Modalities  [X]              Therapeutic Exercises           [ ]       Edema Management/Control  [X]              Therapeutic Activities            [ ]       Patient and Family Training/Education  [ ]              Other (comment):  Discharge Recommendations: Rehab  Further Equipment Recommendations for Discharge: none       SUBJECTIVE:   Patient stated I do not want to do therapy.       OBJECTIVE DATA SUMMARY:   Critical Behavior:  Neurologic State: Alert  Orientation Level: Oriented X4  Cognition: Follows commands  Safety/Judgement: Decreased awareness of need for assistance, Decreased awareness of environment, Decreased awareness of need for safety, Decreased insight into deficits, Lack of insight into deficits     Strength:   Strength: Within functional limits                       Functional Mobility Training:  Bed Mobility:  Rolling: Independent  Supine to Sit: Contact guard assistance  Sit to Supine: Contact guard assistance  Scooting: Independent        Transfers:  Sit to Stand: Stand-by asssistance  Stand to Sit: Stand-by asssistance  Stand Pivot Transfers: Stand-by asssistance     Bed to Chair:  (assisted back to bed)                    Balance:  Sitting: Intact  Standing: Impaired; Without support  Standing - Static: Good  Standing - Dynamic : Fair  Ambulation/Gait Training:  Distance (ft): 40 Feet (ft)  Assistive Device: Gait belt  Ambulation - Level of Assistance: Stand-by asssistance     Gait Description (WDL): Exceptions to WDL  Gait Abnormalities: Path deviations        Base of Support: Widened     Speed/Thu: Fluctuations        Therapeutic Exercises:    Instructed patient to continue active range of motion exercise on both legs while up on chair or on bed.       Pain:  Pain Scale 1: Numeric (0 - 10)  Pain Intensity 1: 0 Activity Tolerance:   Good. Please refer to the flowsheet for vital signs taken during this treatment. After treatment:   [X]  Patient left in no apparent distress sitting up in chair  [ ]  Patient left in no apparent distress in bed  [X]  Call bell left within reach  [X]  Nursing notified  [X]  Caregiver present  [X]  Bed alarm activated      COMMUNICATION/COLLABORATION:   The patients plan of care was discussed with: Occupational Therapist, Registered Nurse and patient     Shasha Duncan PT,WCC.    Time Calculation: 23 mins

## 2017-10-09 NOTE — PROGRESS NOTES
KATHERIN Note: Pt accepted by [facility] for rehab. Waiting on auth. I called and spoke with [facility] JOZEF HERNANDEZ in admissions. She will update me.   MIL Morley

## 2017-10-09 NOTE — ROUTINE PROCESS
Bedside and Verbal shift change report given to Zainab Ross RN (oncoming nurse) by Mazin Adkins RN (offgoing nurse). Report included the following information SBAR, Kardex, MAR, Accordion and Recent Results.

## 2017-10-10 ENCOUNTER — HOME HEALTH ADMISSION (OUTPATIENT)
Dept: HOME HEALTH SERVICES | Facility: HOME HEALTH | Age: 57
End: 2017-10-10
Payer: COMMERCIAL

## 2017-10-10 VITALS
HEART RATE: 82 BPM | OXYGEN SATURATION: 93 % | SYSTOLIC BLOOD PRESSURE: 127 MMHG | DIASTOLIC BLOOD PRESSURE: 64 MMHG | HEIGHT: 62 IN | BODY MASS INDEX: 51.06 KG/M2 | WEIGHT: 277.5 LBS | RESPIRATION RATE: 18 BRPM | TEMPERATURE: 98.2 F

## 2017-10-10 PROCEDURE — 74011250637 HC RX REV CODE- 250/637: Performed by: INTERNAL MEDICINE

## 2017-10-10 PROCEDURE — 74011250636 HC RX REV CODE- 250/636: Performed by: INTERNAL MEDICINE

## 2017-10-10 PROCEDURE — 74011250637 HC RX REV CODE- 250/637: Performed by: PSYCHIATRY & NEUROLOGY

## 2017-10-10 RX ADMIN — VENLAFAXINE HYDROCHLORIDE 225 MG: 150 CAPSULE, EXTENDED RELEASE ORAL at 09:33

## 2017-10-10 RX ADMIN — LEVOTHYROXINE SODIUM 112 MCG: 112 TABLET ORAL at 07:09

## 2017-10-10 RX ADMIN — DEXTROAMPHETAMINE SACCHARATE, AMPHETAMINE ASPARTATE, DEXTROAMPHETAMINE SULFATE AND AMPHETAMINE SULFATE 15 MG: 2.5; 2.5; 2.5; 2.5 TABLET ORAL at 10:56

## 2017-10-10 RX ADMIN — DILTIAZEM HYDROCHLORIDE 240 MG: 120 CAPSULE, COATED, EXTENDED RELEASE ORAL at 09:34

## 2017-10-10 RX ADMIN — LORAZEPAM 1 MG: 1 TABLET ORAL at 09:33

## 2017-10-10 RX ADMIN — HYDROCODONE BITARTRATE AND ACETAMINOPHEN 1 TABLET: 5; 325 TABLET ORAL at 11:02

## 2017-10-10 RX ADMIN — ENOXAPARIN SODIUM 40 MG: 40 INJECTION SUBCUTANEOUS at 09:33

## 2017-10-10 NOTE — PROGRESS NOTES
Carlos Ryan [facility] [address], [place], [address] [facility]  [phone]      Medical Progress Note      NAME: Ambrosio Schulte   :  1960  MRM:  [id number]    Date/Time: 10/10/2017  9:21 AM       Assessment and Plan:     Pyelonephritis / bacteremia - Pan-sensitive klebsiella. Continued cipro and completed 10d abx course (QTc 438)     Nephrolithiasis - US showed large kidney stone with hydronephrosis. Urology following, sp stent placement 10/4. Pt advised to follow up with urology this week, from SNF, CM to help arrange. Continue Pyridium.     Debilitated patient - Appreciate PT/OT eval.  I think she needs SNF. CM working on that, but I now hear that her insurance has denied her SNF. Discharge delayed as patient initially refused appropriate discharge plan, then accepted only late on Friday, so she stayed 3 extra days over the weekend waiting to hear from insurance. We will now wet up [facility] PT. Depression / Anxiety / ADHD - Severe. Prior hospitalization at [facility]. Remains depressed, though less tearful. Her depression clouds her understanding of her debilitated state. Psych consulted, but she is safe to discharge per their recs. They wrote to continue Adderall, Seroquel, Effexor and Ativan, but to stop her low dose of Celexa. Depression is significantly affecting her rehab potential.    Super morbid obese - Advise weight loss. Needs outpatient ELMER testing. JING (acute kidney injury) / Hypokalemia / Hyponatremia - POA, mild, improved, no longer needing IVF     HTN (hypertension) - Controlled on diltiazem    Hypercholesteremia - Continue sinvastatin     Hypothyroid - TSH normal, continue synthroid     Hyperbilirubinemia / elevated alk phos - Normal RUQ US, likely from sepsis     Severe sepsis - POA, due to pyelonephritis, now resolved.   Can stop tele.     Tobacco abuse: smoking cessation advised       Subjective:     Chief Complaint:  Achy, weak, concerned bout getting to urology follow up    ROS:  (bold if positive, if negative)      Tolerating some PT  Tolerating Diet        Objective:     Last 24hrs VS reviewed since prior progress note.  Most recent are:    Visit Vitals    /81 (BP 1 Location: Left arm, BP Patient Position: At rest)    Pulse 77    Temp 98.4 °F (36.9 °C)    Resp 19    Ht 5' 2\" (1.575 m)    Wt 125.9 kg (277 lb 8 oz)    SpO2 92%    BMI 50.76 kg/m2     SpO2 Readings from Last 6 Encounters:   10/10/17 92%   09/28/17 93%   06/08/17 95%   04/18/17 97%   03/16/17 96%   01/20/17 96%    O2 Flow Rate (L/min): 2 l/min       Intake/Output Summary (Last 24 hours) at 10/10/17 3090  Last data filed at 10/10/17 0328   Gross per 24 hour   Intake                0 ml   Output             2350 ml   Net            -2350 ml        Physical Exam:    Gen:  Super morbid obese, in no acute distress  HEENT:  Pink conjunctivae, PERRL, hearing intact to voice, moist mucous membranes  Neck:  Supple, without masses, thyroid non-tender  Resp:  No accessory muscle use, distant breath sounds without wheezes rales or rhonchi  Card:  No murmurs, distant S1, S2 without thrills, bruits, 1+ peripheral edema  Abd:  Soft, non-tender, non-distended, distant bowel sounds are present, no mass  Lymph:  No cervical or inguinal adenopathy  Musc:  No cyanosis or clubbing  Skin:  No rashes or ulcers, skin turgor is good  Neuro:  Cranial nerves are grossly intact, general motor weakness, follows commands vaguely  Psych:  Poor insight, oriented to person, place and time, anxious    Telemetry reviewed:   normal sinus rhythm  __________________________________________________________________  Medications Reviewed: (see below)  Medications:     Current Facility-Administered Medications   Medication Dose Route Frequency    dilTIAZem CD (CARDIZEM CD) capsule 240 mg  240 mg Oral DAILY    ibuprofen (MOTRIN) tablet 200 mg  200 mg Oral Q6H PRN    nicotine (NICODERM CQ) 14 mg/24 hr patch 1 Patch  1 Patch TransDERmal DAILY    phenazopyridine (PYRIDIUM) tablet 100 mg  100 mg Oral TID PRN    lidocaine (PF) (XYLOCAINE) 10 mg/mL (1 %) injection 0.1 mL  0.1 mL SubCUTAneous PRN    sodium chloride (NS) flush 5-10 mL  5-10 mL IntraVENous Q8H    sodium chloride (NS) flush 5-10 mL  5-10 mL IntraVENous PRN    venlafaxine-SR (EFFEXOR-XR) capsule 225 mg  225 mg Oral DAILY WITH BREAKFAST    dextroamphetamine-amphetamine (ADDERALL) tablet 15 mg  15 mg Oral BID    QUEtiapine (SEROquel) tablet 50 mg  50 mg Oral QHS    acetaminophen (TYLENOL) tablet 650 mg  650 mg Oral Q4H PRN    HYDROcodone-acetaminophen (NORCO) 5-325 mg per tablet 1 Tab  1 Tab Oral Q4H PRN    naloxone (NARCAN) injection 0.4 mg  0.4 mg IntraVENous PRN    ondansetron (ZOFRAN) injection 4 mg  4 mg IntraVENous Q4H PRN    enoxaparin (LOVENOX) injection 40 mg  40 mg SubCUTAneous Q12H    LORazepam (ATIVAN) tablet 1 mg  1 mg Oral BID    levothyroxine (SYNTHROID) tablet 112 mcg  112 mcg Oral ACB    simvastatin (ZOCOR) tablet 10 mg  10 mg Oral QHS    albuterol (PROVENTIL VENTOLIN) nebulizer solution 2.5 mg  2.5 mg Nebulization Q4H PRN        Lab Data Reviewed: (see below)  Lab Review:     No results for input(s): WBC, HGB, HCT, PLT, HGBEXT, HCTEXT, PLTEXT, HGBEXT, HCTEXT, PLTEXT in the last 72 hours. No results for input(s): NA, K, CL, CO2, GLU, BUN, CREA, CA, MG, PHOS, ALB, TBIL, TBILI, SGOT, ALT, INR in the last 72 hours. No lab exists for component: INREXT, INREXT  Lab Results   Component Value Date/Time    Glucose (POC) 107 08/18/2012 07:11 PM     No results for input(s): PH, PCO2, PO2, HCO3, FIO2 in the last 72 hours. No results for input(s): INR in the last 72 hours.     No lab exists for component: Renetta Green  All Micro Results     Procedure Component Value Units Date/Time    CULTURE, BLOOD, PAIRED [[id number]] Collected:  10/03/17 2323    Order Status:  Completed Specimen:  Blood Updated:  10/09/17 0738     Special Requests: NO SPECIAL REQUESTS Culture result: NO GROWTH 5 DAYS       CULTURE, BLOOD, PAIRED [[id number]] Collected:  10/02/17 0451    Order Status:  Completed Specimen:  Blood Updated:  10/07/17 1010     Special Requests: NO SPECIAL REQUESTS        Culture result: NO GROWTH 5 DAYS       CULTURE, BLOOD [[id number]]  (Abnormal)  (Susceptibility) Collected:  09/30/17 1622    Order Status:  Completed Specimen:  Blood from Blood Updated:  10/06/17 0801     Special Requests: NO SPECIAL REQUESTS        Culture result:         KLEBSIELLA PNEUMONIAE GROWING IN 1 OF 2 BOTTLES DRAWN . ..(SITE= R HAND) (A)      PRELIMINARY RESULT OF GRAM NEGATIVE RODS  GROWING IN 1 OF 2 BOTTLES DRAWN  CALLED TO AND READ BACK BY  MIL JUSTICE AT 1231, 10/1/17 DB                REMAINING BOTTLE(S) HAS/HAVE NO GROWTH IN 5 DAYS    CULTURE, BLOOD [[id number]] Collected:  09/30/17 1622    Order Status:  Completed Specimen:  Blood from Blood Updated:  10/06/17 0717     Special Requests: NO SPECIAL REQUESTS        Culture result: NO GROWTH 6 DAYS       CULTURE, URINE [[id number]]  (Abnormal)  (Susceptibility) Collected:  09/30/17 1648    Order Status:  Completed Specimen:  Urine Updated:  10/02/17 1141     Special Requests: --        NO SPECIAL REQUESTS  Reflexed from [id number]       [place] Count --        [id number]  COLONIES/mL       Culture result: KLEBSIELLA PNEUMONIAE (A)             I have reviewed notes of prior 24hr.     Other pertinent lab: none    Total time spent with patient: [address] [place] [address] discussed with: Patient, Nursing Staff, Consultant/Specialist and >50% of time spent in counseling and coordination of care    Discussed:  Care Plan and D/C Planning    Prophylaxis:  Lovenox and H2B/PPI    Disposition:  SNF/LTC           ___________________________________________________    Attending Physician: Maral Solitario MD

## 2017-10-10 NOTE — PROGRESS NOTES
Problem: Falls - Risk of  Goal: *Absence of Falls  Document Moises Fall Risk and appropriate interventions in the flowsheet.    Outcome: Progressing Towards Goal  Fall Risk Interventions:  Mobility Interventions: Patient to call before getting OOB, PT Consult for mobility concerns     Mentation Interventions: Door open when patient unattended     Medication Interventions: Bed/chair exit alarm, Patient to call before getting OOB, Teach patient to arise slowly     Elimination Interventions: Call light in reach, Toilet paper/wipes in reach     History of Falls Interventions: Bed/chair exit alarm, Door open when patient unattended

## 2017-10-10 NOTE — PROGRESS NOTES
KATHERIN Note:  [facility] stated she is denying the pt as she does not meet snf guidelines. Order noted for home health. Pt had no preference for hh agency. A referral was sent in [address] [place] [address] to [facility] for PT/OT. Order for RW noted. Pt had no preference for DME company. A referral was sent in [facility] to [facility]. Pt was accepted. A RW was obtained from the DME closet. All paperwork competed and pt given a receipt. Order for urology appt noted. Appt made. Pt accepted by [facility].   MIL Morley

## 2017-10-10 NOTE — PROGRESS NOTES
Bedside shift change report given to Madan Grey (oncoming nurse) by Jen Veliz (offgoing nurse). Report included the following information SBAR, Kardex, Procedure Summary, Intake/Output and MAR.

## 2017-10-10 NOTE — PROGRESS NOTES
I have reviewed discharge instructions with the patient. The patient verbalized understanding. Gave opportunity for questions. Patient transported downstairs via [facility] by volunteer.

## 2017-10-10 NOTE — PROGRESS NOTES
Home Care Face to Face Encounter    Patients Name: Jazzy Deutsch    YOB: 1960    Primary Diagnosis: pyelonephritis    Date of Face to Face:   October 10, 2017                                 Face to Face Encounter findings are related to primary reason for home care:   yes. 1. I certify that the patient needs intermittent care as follows: physical therapy: strengthening, stretching/ROM, transfer training, gait/stair training, balance training and pt/caregiver education  occupational therapy:  ADL safety (ie. cooking, bathing, dressing) and pt/caregiver education    2. I certify that this patient is homebound, that is: 1) patient requires the use of a walker device, special transportation, or assistance of another to leave the home; or 2) patient's condition makes leaving the home medically contraindicated; and 3) patient has a normal inability to leave the home and leaving the home requires considerable and taxing effort. Patient may leave the home for infrequent and short duration for medical reasons, and occasional absences for non-medical reasons. Homebound status is due to the following functional limitations: Patient with strength deficits limiting the performance of all ADL's without caregiver assistance or the use of an assistive device. Patient with poor safety awareness and is at risk for falls without assistance of another person and the use of an assistive device. Patient with poor ambulation endurance limiting their safe ability to ascend/descend the required number of steps to leave the home. 3. I certify that this patient is under my care and that I, or a nurse practitioner or  [id number], or clinical nurse specialist, or certified nurse midwife, working with me, had a Face-to-Face Encounter that meets the physician Face-to-Face Encounter requirements.   The following are the clinical findings from the [address] [place] [address] encounter that support the need for skilled services and is a summary of the encounter: per PT note \"Supine to sit CGA, sit to stand SBA, ambulate without assistive device to and from the bathroom SBA, no loss of balance. Patient has a  labile affect. Nurse in the room assisting to convince patient to participate with therapy which patient finally did after few episodes of crying and being angry. Offered to be OOB to chair patient just want to go back to bed.  \"    See discharge summary      Viola Warren MD  10/10/2017

## 2017-10-12 ENCOUNTER — HOME CARE VISIT (OUTPATIENT)
Dept: SCHEDULING | Facility: HOME HEALTH | Age: 57
End: 2017-10-12
Payer: COMMERCIAL

## 2017-10-12 PROCEDURE — G0151 HHCP-SERV OF PT,EA 15 MIN: HCPCS

## 2017-10-13 ENCOUNTER — OFFICE VISIT (OUTPATIENT)
Dept: INTERNAL MEDICINE CLINIC | Age: 57
End: 2017-10-13

## 2017-10-13 ENCOUNTER — HOME CARE VISIT (OUTPATIENT)
Dept: SCHEDULING | Facility: HOME HEALTH | Age: 57
End: 2017-10-13
Payer: COMMERCIAL

## 2017-10-13 VITALS
WEIGHT: 264 LBS | HEART RATE: 92 BPM | BODY MASS INDEX: 48.58 KG/M2 | OXYGEN SATURATION: 95 % | HEIGHT: 62 IN | RESPIRATION RATE: 12 BRPM | SYSTOLIC BLOOD PRESSURE: 146 MMHG | DIASTOLIC BLOOD PRESSURE: 88 MMHG | TEMPERATURE: 98.3 F

## 2017-10-13 VITALS
OXYGEN SATURATION: 95 % | TEMPERATURE: 98.3 F | SYSTOLIC BLOOD PRESSURE: 142 MMHG | DIASTOLIC BLOOD PRESSURE: 85 MMHG | HEART RATE: 90 BPM

## 2017-10-13 DIAGNOSIS — D64.9 ANEMIA, UNSPECIFIED TYPE: ICD-10-CM

## 2017-10-13 DIAGNOSIS — R19.7 DIARRHEA, UNSPECIFIED TYPE: ICD-10-CM

## 2017-10-13 DIAGNOSIS — N20.0 NEPHROLITHIASIS: Primary | ICD-10-CM

## 2017-10-13 DIAGNOSIS — I10 ESSENTIAL HYPERTENSION: ICD-10-CM

## 2017-10-13 DIAGNOSIS — R21 RASH: ICD-10-CM

## 2017-10-13 PROCEDURE — G0152 HHCP-SERV OF OT,EA 15 MIN: HCPCS

## 2017-10-13 RX ORDER — NYSTATIN 100000 U/G
CREAM TOPICAL 2 TIMES DAILY
Qty: 15 G | Refills: 2 | Status: SHIPPED | OUTPATIENT
Start: 2017-10-13 | End: 2018-01-16

## 2017-10-13 NOTE — MR AVS SNAPSHOT
Visit Information Date & Time Provider Department Dept. Phone Encounter #  
 10/13/2017  9:40 AM Nikki Carolina MD [facility] [address] [id number] Upcoming Health Maintenance Date Due Hepatitis C Screening 1960 Pneumococcal 19-64 Medium Risk (1 of 1 - PPSV23) 1/16/1981 FOBT Q 1 YEAR AGE 50-75 1/16/2012 BREAST CANCER SCRN MAMMOGRAM 6/4/2016 INFLUENZA AGE 9 TO ADULT 8/1/2017 PAP AKA CERVICAL CYTOLOGY 2/2/2020 DTaP/Tdap/Td series (2 - Td) 9/10/2025 Allergies as of 10/13/2017  Review Complete On: 10/13/2017 By: Lizzette Syed Severity Noted Reaction Type Reactions Egg  06/04/2014    Other (comments) Abdominal pain Current Immunizations  Reviewed on 9/10/2015 Name Date Tdap 9/10/2015 Not reviewed this visit You Were Diagnosed With   
  
 Codes Comments Essential hypertension    -  Primary ICD-10-CM: I10 
ICD-9-CM: 401.9 Rash     ICD-10-CM: R21 
ICD-9-CM: 782.1 Anemia, unspecified type     ICD-10-CM: D64.9 ICD-9-CM: 285.9 Diarrhea, unspecified type     ICD-10-CM: R19.7 ICD-9-CM: 787.91 Vitals BP Pulse Temp Resp Height(growth percentile) Weight(growth percentile) 146/88 (BP 1 Location: Left arm, BP Patient Position: Sitting) 92 98.3 °F (36.8 °C) (Oral) 12 5' 2\" (1.575 m) 264 lb (119.7 kg) LMP SpO2 BMI OB Status Smoking Status 07/13/2007 95% 48.29 kg/m2 Postmenopausal Current Every Day Smoker BMI and BSA Data Body Mass Index Body Surface Area  
 48.29 kg/m 2 2.29 m 2 Preferred Pharmacy Pharmacy Name Phone [id number], [address] [place] [address] [phone] Your Updated Medication List  
  
   
This list is accurate as of: 10/13/17 10:35 AM.  Always use your most recent med list.  
  
  
  
  
 dextroamphetamine-amphetamine 15 mg tablet Commonly known as:  ADDERALL Take 1 Tab (15 mg total) by mouth two (2) times a day. Max Daily Amount: 30 mg  
  
 dilTIAZem  mg ER capsule Commonly known as:  CARDIZEM CD Take 1 Cap by mouth daily for 90 days. Indications: hypertension LORazepam 1 mg tablet Commonly known as:  ATIVAN Take 1 mg by mouth two (2) times a day. nystatin topical cream  
Commonly known as:  MYCOSTATIN Apply  to affected area two (2) times a day. QUEtiapine 25 mg tablet Commonly known as:  SEROquel Take 2 Tabs by mouth nightly as needed (insomnia, anxiety). simvastatin 10 mg tablet Commonly known as:  ZOCOR  
TAKE 1 TABLET BY MOUTH NIGHTLY  
  
 SYNTHROID 112 mcg tablet Generic drug:  levothyroxine Take 112 mcg by mouth Daily (before breakfast). venlafaxine-SR 75 mg capsule Commonly known as:  EFFEXOR-XR Take 3 Caps by mouth daily (with breakfast) for 14 days. VITAMIN D3 2,000 unit Tab Generic drug:  cholecalciferol (vitamin D3) Take 2,000 Units by mouth five (5) days a week. The patient takes 2000 units Monday, Tuesday, Wednesday, Thursday, Friday Prescriptions Sent to Pharmacy Refills  
 nystatin (MYCOSTATIN) topical cream 2 Sig: Apply  to affected area two (2) times a day. Class: Normal  
 Pharmacy: [facility]/pharmacy [address] [place] [address] Ph #: [phone] Route: Topical  
  
We Performed the Following CBC W/O DIFF [43171 CPT(R)] METABOLIC PANEL, COMPREHENSIVE [79232 CPT(R)] To-Do List   
 10/13/2017 Microbiology:  CULTURE, C DIFFICILE   
  
 10/13/2017 Microbiology:  CULTURE, STOOL   
  
 10/13/2017 Lab:  WBC, STOOL   
  
 10/13/2017 2:30 PM  
  Appointment with Trino Bazan OT at [place] [address]  
  
 10/31/2017 To Be Determined Appointment with Adia Santana PT at [place] [address]  
  
 11/16/2017 To Be Determined Appointment with Chito Van PT at [address] [facility]! Dear Ricky Russell: Thank you for requesting a [employer] account. Our records indicate that you already have an active [employer] account. You can access your account anytime at [email or web address] Did you know that you can access your hospital and ER discharge instructions at any time in [employer]? You can also review all of your test results from your hospital stay or ER visit. Additional Information If you have questions, please visit the Frequently Asked Questions section of the [employer] website at [email or web address] Remember, [employer] is NOT to be used for urgent needs. For medical emergencies, dial 911. Now available from your iPhone and Android! Please provide this summary of care documentation to your next provider. Your primary care clinician is listed as Mary Clifford. If you have any questions after today's visit, please call [phone].

## 2017-10-14 VITALS
WEIGHT: 277 LBS | OXYGEN SATURATION: 96 % | SYSTOLIC BLOOD PRESSURE: 134 MMHG | DIASTOLIC BLOOD PRESSURE: 70 MMHG | RESPIRATION RATE: 18 BRPM | TEMPERATURE: 98.2 F | BODY MASS INDEX: 50.97 KG/M2 | HEART RATE: 84 BPM | HEIGHT: 62 IN

## 2017-10-14 LAB
ALBUMIN SERPL-MCNC: 3.8 G/DL (ref 3.5–5.5)
ALBUMIN/GLOB SERPL: 1.4 {RATIO} (ref 1.2–2.2)
ALP SERPL-CCNC: 165 IU/L (ref 39–117)
ALT SERPL-CCNC: 21 IU/L (ref 0–32)
AST SERPL-CCNC: 30 IU/L (ref 0–40)
BILIRUB SERPL-MCNC: 0.6 MG/DL (ref 0–1.2)
BUN SERPL-MCNC: 10 MG/DL (ref 6–24)
BUN/CREAT SERPL: 11 (ref 9–23)
CALCIUM SERPL-MCNC: 9.2 MG/DL (ref 8.7–10.2)
CHLORIDE SERPL-SCNC: 96 MMOL/L (ref 96–106)
CO2 SERPL-SCNC: 26 MMOL/L (ref 18–29)
CREAT SERPL-MCNC: 0.87 MG/DL (ref 0.57–1)
ERYTHROCYTE [DISTWIDTH] IN BLOOD BY AUTOMATED COUNT: 14.5 % (ref 12.3–15.4)
GLOBULIN SER CALC-MCNC: 2.8 G/DL (ref 1.5–4.5)
GLUCOSE SERPL-MCNC: 89 MG/DL (ref 65–99)
HCT VFR BLD AUTO: 36.8 % (ref 34–46.6)
HGB BLD-MCNC: 12.3 G/DL (ref 11.1–15.9)
MCH RBC QN AUTO: 30.5 PG (ref 26.6–33)
MCHC RBC AUTO-ENTMCNC: 33.4 G/DL (ref 31.5–35.7)
MCV RBC AUTO: 91 FL (ref 79–97)
PLATELET # BLD AUTO: 345 X10E3/UL (ref 150–379)
POTASSIUM SERPL-SCNC: 4.4 MMOL/L (ref 3.5–5.2)
PROT SERPL-MCNC: 6.6 G/DL (ref 6–8.5)
RBC # BLD AUTO: 4.03 X10E6/UL (ref 3.77–5.28)
SODIUM SERPL-SCNC: 138 MMOL/L (ref 134–144)
WBC # BLD AUTO: 9.7 X10E3/UL (ref 3.4–10.8)

## 2017-10-15 NOTE — PROGRESS NOTES
Chief Complaint   Patient presents with   [facility] Follow Up     Diagnoses and all orders for this visit:      Pt is here for follow up. 1. Nephrolithiasis history of urosepsis  Pt presents for follow up from hospitalization at [address] [place] [address] 9/30/17 for pyelonephritis and bacteremia. She was found to have a 1.1 x 1.7 cm renal stone and required renal stent. Following stent placement and abx with Cipro she was able to defervesce. Pt was compromised and referred for OT/PT but deferred. She reports overall doing well at home but functionally still weak with standing and ambulation. She can get to the BR and use independently but is careful. Her daughter and  tranferred bed downstairs so it is easier on her. Pt's July Mortensen is present on visit and is very familiar with pt's baseline. 2. Essential hypertension  Stable  Her diltiazam was increased to 240 mg daily  She denies lightheaded or dizziness  -     METABOLIC PANEL, COMPREHENSIVE    3. Rash  Pt is c/o rash between legs. She reports itchy at groin area and not resolving. She is not using anything otc. 4. Anemia, unspecified type  Labs reviewed with pt  May be acute manifestation since she was in the hospital  Will monitor and replete if needed  -     CBC W/O DIFF    5. Diarrhea, unspecified type  S/p abx and at risk for abx colitis and /or cdiff  Will assess  Will take probiotics  -     CULTURE, C DIFFICILE;  Future  -     CULTURE, STOOL; Future  -     WBC, STOOL; Future            Past Medical History:   Diagnosis Date    Anxiety     Depression 8/19/2010    HTN (hypertension)     Hyperlipidemia LDL goal < 130     Hypothyroid     Psychotic disorder     Severe depression and anxiety diagnosed at 35 yo    Tobacco abuse      Past Surgical History:   Procedure Laterality Date    HX APPENDECTOMY      HX DILATION AND CURETTAGE       Social History     Social History    Marital status:      Spouse name: N/A    Number of children: N/A    Years of education: N/A     Social History Main Topics    Smoking status: Current Every Day Smoker     Packs/day: 1.00    Smokeless tobacco: Never Used      Comment:  1 ppd/40+ years    Alcohol use No    Drug use: No      Comment: 1 pack a day    Sexual activity: No     Other Topics Concern    None     Social History Narrative            2 children        Not employed/filing for disability    Stopped working after children were born 20 yo and 13 yo     History reviewed. No pertinent family history. Current Outpatient Prescriptions   Medication Sig Dispense Refill    nystatin (MYCOSTATIN) topical cream Apply  to affected area two (2) times a day. 15 g 2    dilTIAZem CD (CARDIZEM CD) 240 mg ER capsule Take 1 Cap by mouth daily for 90 days. Indications: hypertension 30 Cap 2    dextroamphetamine-amphetamine (ADDERALL) 15 mg tablet Take 1 Tab (15 mg total) by mouth two (2) times a day. Max Daily Amount: 30 mg 14 Tab 0    venlafaxine-SR (EFFEXOR-XR) 75 mg capsule Take 3 Caps by mouth daily (with breakfast) for 14 days. 42 Cap 0    QUEtiapine (SEROQUEL) 25 mg tablet Take 2 Tabs by mouth nightly as needed (insomnia, anxiety). 14 Tab 0    LORazepam (ATIVAN) 1 mg tablet Take 1 mg by mouth two (2) times a day.  simvastatin (ZOCOR) 10 mg tablet TAKE 1 TABLET BY MOUTH NIGHTLY 90 Tab 0    cholecalciferol, vitamin D3, (VITAMIN D3) 2,000 unit tab Take 2,000 Units by mouth five (5) days a week. The patient takes 2000 units Monday, Tuesday, Wednesday, Thursday, Friday      levothyroxine (SYNTHROID) 112 mcg tablet Take 112 mcg by mouth Daily (before breakfast).  nystatin (MYCOSTATIN) topical cream Apply 100,000 Units to affected area two (2) times a day.  trimethoprim-sulfamethoxazole (BACTRIM DS, SEPTRA DS) 160-800 mg per tablet Take 1 Tab by mouth two (2) times a day.  phenazopyridine (PYRIDIUM) 100 mg tablet Take 100 mg by mouth three (3) times daily as needed for Pain. Allergies   Allergen Reactions    Egg Other (comments)     Abdominal pain       Review of Systems - General ROS: positive for  - fatigue  negative for - chills or fever  Cardiovascular ROS: no chest pain or dyspnea on exertion  Respiratory ROS: no cough, shortness of breath, or wheezing    Visit Vitals    /88 (BP 1 Location: Left arm, BP Patient Position: Sitting)    Pulse 92    Temp 98.3 °F (36.8 °C) (Oral)    Resp 12    Ht 5' 2\" (1.575 m)    Wt 264 lb (119.7 kg)    LMP 07/13/2007    SpO2 95%    BMI 48.29 kg/m2     General Appearance:  Well developed, well nourished,alert and oriented x 3, and individual in no acute distress. Ears/Nose/Mouth/Throat:   Hearing grossly normal.         Neck: Supple, no lad, no bruits   Chest:   Lungs clear to auscultation bilaterally. Cardiovascular:  Regular rate and rhythm, S1, S2 normal, no murmur. Abdomen:   Soft, non-tender, bowel sounds are active. Extremities: No edema bilaterally. Skin: Warm and dry, no suspicious lesions  Erythematous circular lesions distal groin area under flap of skn, appears to be demarcted               Diagnoses and all orders for this visit:    1. Nephrolithiasis  Will follow up with urology    2. Essential hypertension  Stable cont meds  Seems to be tolerating higher dose of dilt  -     METABOLIC PANEL, COMPREHENSIVE    3. Rash  Will try nystatin    4. Anemia, unspecified type  May be inflammatory  Monitor and replete if needed  -     CBC W/O DIFF    5. Diarrhea, unspecified type  -     CULTURE, C DIFFICILE; Future  -     CULTURE, STOOL; Future  -     WBC, STOOL; Future    Other orders  -     nystatin (MYCOSTATIN) topical cream; Apply  to affected area two (2) times a day. This note will not be viewable in [address]

## 2017-10-18 ENCOUNTER — HOME CARE VISIT (OUTPATIENT)
Dept: HOME HEALTH SERVICES | Facility: HOME HEALTH | Age: 57
End: 2017-10-18
Payer: COMMERCIAL

## 2017-10-20 ENCOUNTER — HOME CARE VISIT (OUTPATIENT)
Dept: HOME HEALTH SERVICES | Facility: HOME HEALTH | Age: 57
End: 2017-10-20
Payer: COMMERCIAL

## 2017-10-23 ENCOUNTER — HOME CARE VISIT (OUTPATIENT)
Dept: HOME HEALTH SERVICES | Facility: HOME HEALTH | Age: 57
End: 2017-10-23
Payer: COMMERCIAL

## 2017-10-23 PROCEDURE — G0157 HHC PT ASSISTANT EA 15: HCPCS

## 2017-10-24 ENCOUNTER — HOME CARE VISIT (OUTPATIENT)
Dept: HOME HEALTH SERVICES | Facility: HOME HEALTH | Age: 57
End: 2017-10-24
Payer: COMMERCIAL

## 2017-10-25 ENCOUNTER — HOME CARE VISIT (OUTPATIENT)
Dept: HOME HEALTH SERVICES | Facility: HOME HEALTH | Age: 57
End: 2017-10-25
Payer: COMMERCIAL

## 2017-12-14 DIAGNOSIS — E78.00 HYPERCHOLESTEREMIA: ICD-10-CM

## 2017-12-15 RX ORDER — SIMVASTATIN 10 MG/1
TABLET, FILM COATED ORAL
Qty: 90 TAB | Refills: 0 | Status: SHIPPED | OUTPATIENT
Start: 2017-12-15 | End: 2018-03-18 | Stop reason: SDUPTHER

## 2017-12-19 RX ORDER — DILTIAZEM HYDROCHLORIDE 240 MG/1
240 CAPSULE, COATED, EXTENDED RELEASE ORAL DAILY
Qty: 90 CAP | Refills: 0 | Status: SHIPPED | OUTPATIENT
Start: 2017-12-19 | End: 2018-03-18 | Stop reason: SDUPTHER

## 2017-12-19 NOTE — TELEPHONE ENCOUNTER
Spoke to pt, pt stated she has been taking 240 mg diltiazem that was prescribed during her hospital stay. Pt requested a refill, rx request sent to PCP.

## 2018-01-16 ENCOUNTER — APPOINTMENT (OUTPATIENT)
Dept: GENERAL RADIOLOGY | Age: 58
DRG: 194 | End: 2018-01-16
Attending: EMERGENCY MEDICINE
Payer: COMMERCIAL

## 2018-01-16 ENCOUNTER — HOSPITAL ENCOUNTER (INPATIENT)
Age: 58
LOS: 2 days | Discharge: HOME | DRG: 194 | End: 2018-01-18
Attending: EMERGENCY MEDICINE | Admitting: INTERNAL MEDICINE
Payer: COMMERCIAL

## 2018-01-16 DIAGNOSIS — J18.9 COMMUNITY ACQUIRED PNEUMONIA OF RIGHT LOWER LOBE OF LUNG: Primary | ICD-10-CM

## 2018-01-16 DIAGNOSIS — J45.51 SEVERE PERSISTENT REACTIVE AIRWAY DISEASE WITH ACUTE EXACERBATION: ICD-10-CM

## 2018-01-16 PROBLEM — A41.9 SEVERE SEPSIS (HCC): Status: RESOLVED | Noted: 2017-09-30 | Resolved: 2018-01-16

## 2018-01-16 PROBLEM — R65.20 SEVERE SEPSIS (HCC): Status: RESOLVED | Noted: 2017-09-30 | Resolved: 2018-01-16

## 2018-01-16 PROBLEM — E66.9 OBESITY: Status: ACTIVE | Noted: 2018-01-16

## 2018-01-16 LAB
ANION GAP SERPL CALC-SCNC: 9 MMOL/L (ref 5–15)
BASOPHILS # BLD: 0 K/UL (ref 0–0.1)
BASOPHILS NFR BLD: 0 % (ref 0–1)
BUN SERPL-MCNC: 8 MG/DL (ref 6–20)
BUN/CREAT SERPL: 6 (ref 12–20)
CALCIUM SERPL-MCNC: 8.8 MG/DL (ref 8.5–10.1)
CHLORIDE SERPL-SCNC: 99 MMOL/L (ref 97–108)
CO2 SERPL-SCNC: 28 MMOL/L (ref 21–32)
CREAT SERPL-MCNC: 1.33 MG/DL (ref 0.55–1.02)
EOSINOPHIL # BLD: 0 K/UL (ref 0–0.4)
EOSINOPHIL NFR BLD: 0 % (ref 0–7)
ERYTHROCYTE [DISTWIDTH] IN BLOOD BY AUTOMATED COUNT: 13.1 % (ref 11.5–14.5)
FLUAV AG NPH QL IA: NEGATIVE
FLUBV AG NOSE QL IA: NEGATIVE
GLUCOSE BLD STRIP.AUTO-MCNC: 174 MG/DL (ref 65–100)
GLUCOSE SERPL-MCNC: 177 MG/DL (ref 65–100)
HCT VFR BLD AUTO: 42 % (ref 35–47)
HGB BLD-MCNC: 13.8 G/DL (ref 11.5–16)
LACTATE SERPL-SCNC: 2.4 MMOL/L (ref 0.4–2)
LYMPHOCYTES # BLD: 0.6 K/UL (ref 0.8–3.5)
LYMPHOCYTES NFR BLD: 8 % (ref 12–49)
MCH RBC QN AUTO: 31.4 PG (ref 26–34)
MCHC RBC AUTO-ENTMCNC: 32.9 G/DL (ref 30–36.5)
MCV RBC AUTO: 95.7 FL (ref 80–99)
MONOCYTES # BLD: 0.2 K/UL (ref 0–1)
MONOCYTES NFR BLD: 3 % (ref 5–13)
NEUTS SEG # BLD: 6.8 K/UL (ref 1.8–8)
NEUTS SEG NFR BLD: 89 % (ref 32–75)
PLATELET # BLD AUTO: 184 K/UL (ref 150–400)
POTASSIUM SERPL-SCNC: 3.2 MMOL/L (ref 3.5–5.1)
RBC # BLD AUTO: 4.39 M/UL (ref 3.8–5.2)
RBC MORPH BLD: ABNORMAL
SERVICE CMNT-IMP: ABNORMAL
SODIUM SERPL-SCNC: 136 MMOL/L (ref 136–145)
WBC # BLD AUTO: 7.6 K/UL (ref 3.6–11)

## 2018-01-16 PROCEDURE — 86738 MYCOPLASMA ANTIBODY: CPT | Performed by: INTERNAL MEDICINE

## 2018-01-16 PROCEDURE — 83605 ASSAY OF LACTIC ACID: CPT | Performed by: EMERGENCY MEDICINE

## 2018-01-16 PROCEDURE — 87804 INFLUENZA ASSAY W/OPTIC: CPT | Performed by: EMERGENCY MEDICINE

## 2018-01-16 PROCEDURE — 94640 AIRWAY INHALATION TREATMENT: CPT

## 2018-01-16 PROCEDURE — 74011000250 HC RX REV CODE- 250: Performed by: EMERGENCY MEDICINE

## 2018-01-16 PROCEDURE — 74011000250 HC RX REV CODE- 250: Performed by: INTERNAL MEDICINE

## 2018-01-16 PROCEDURE — 74011250636 HC RX REV CODE- 250/636: Performed by: INTERNAL MEDICINE

## 2018-01-16 PROCEDURE — 99285 EMERGENCY DEPT VISIT HI MDM: CPT

## 2018-01-16 PROCEDURE — 85025 COMPLETE CBC W/AUTO DIFF WBC: CPT | Performed by: EMERGENCY MEDICINE

## 2018-01-16 PROCEDURE — 74011250637 HC RX REV CODE- 250/637: Performed by: EMERGENCY MEDICINE

## 2018-01-16 PROCEDURE — 80048 BASIC METABOLIC PNL TOTAL CA: CPT | Performed by: EMERGENCY MEDICINE

## 2018-01-16 PROCEDURE — 74011000258 HC RX REV CODE- 258: Performed by: EMERGENCY MEDICINE

## 2018-01-16 PROCEDURE — 74011250637 HC RX REV CODE- 250/637: Performed by: INTERNAL MEDICINE

## 2018-01-16 PROCEDURE — 36415 COLL VENOUS BLD VENIPUNCTURE: CPT | Performed by: EMERGENCY MEDICINE

## 2018-01-16 PROCEDURE — 82962 GLUCOSE BLOOD TEST: CPT

## 2018-01-16 PROCEDURE — 65660000000 HC RM CCU STEPDOWN

## 2018-01-16 PROCEDURE — 77030029684 HC NEB SM VOL KT MONA -A

## 2018-01-16 PROCEDURE — 74011250636 HC RX REV CODE- 250/636: Performed by: EMERGENCY MEDICINE

## 2018-01-16 PROCEDURE — 93005 ELECTROCARDIOGRAM TRACING: CPT

## 2018-01-16 PROCEDURE — 87040 BLOOD CULTURE FOR BACTERIA: CPT | Performed by: EMERGENCY MEDICINE

## 2018-01-16 PROCEDURE — 71046 X-RAY EXAM CHEST 2 VIEWS: CPT

## 2018-01-16 RX ORDER — ALBUTEROL SULFATE 0.83 MG/ML
2.5 SOLUTION RESPIRATORY (INHALATION)
Status: DISCONTINUED | OUTPATIENT
Start: 2018-01-16 | End: 2018-01-18

## 2018-01-16 RX ORDER — LORAZEPAM 1 MG/1
1 TABLET ORAL
Status: DISCONTINUED | OUTPATIENT
Start: 2018-01-16 | End: 2018-01-18 | Stop reason: HOSPADM

## 2018-01-16 RX ORDER — LEVOTHYROXINE SODIUM 112 UG/1
112 TABLET ORAL
Status: DISCONTINUED | OUTPATIENT
Start: 2018-01-17 | End: 2018-01-18 | Stop reason: HOSPADM

## 2018-01-16 RX ORDER — IPRATROPIUM BROMIDE AND ALBUTEROL SULFATE 2.5; .5 MG/3ML; MG/3ML
3 SOLUTION RESPIRATORY (INHALATION)
Status: DISCONTINUED | OUTPATIENT
Start: 2018-01-16 | End: 2018-01-18

## 2018-01-16 RX ORDER — ONDANSETRON 2 MG/ML
4 INJECTION INTRAMUSCULAR; INTRAVENOUS
Status: DISCONTINUED | OUTPATIENT
Start: 2018-01-16 | End: 2018-01-18 | Stop reason: HOSPADM

## 2018-01-16 RX ORDER — SODIUM CHLORIDE 0.9 % (FLUSH) 0.9 %
5-10 SYRINGE (ML) INJECTION AS NEEDED
Status: DISCONTINUED | OUTPATIENT
Start: 2018-01-16 | End: 2018-01-18 | Stop reason: HOSPADM

## 2018-01-16 RX ORDER — ACETAMINOPHEN 325 MG/1
975 TABLET ORAL
Status: COMPLETED | OUTPATIENT
Start: 2018-01-16 | End: 2018-01-16

## 2018-01-16 RX ORDER — DEXAMETHASONE 4 MG/1
10 TABLET ORAL
Status: COMPLETED | OUTPATIENT
Start: 2018-01-16 | End: 2018-01-16

## 2018-01-16 RX ORDER — CHOLECALCIFEROL (VITAMIN D3) 125 MCG
4000 CAPSULE ORAL
COMMUNITY
End: 2018-01-24 | Stop reason: SDUPTHER

## 2018-01-16 RX ORDER — IBUPROFEN 600 MG/1
600 TABLET ORAL
Status: COMPLETED | OUTPATIENT
Start: 2018-01-16 | End: 2018-01-16

## 2018-01-16 RX ORDER — VENLAFAXINE HYDROCHLORIDE 75 MG/1
225 CAPSULE, EXTENDED RELEASE ORAL DAILY
COMMUNITY

## 2018-01-16 RX ORDER — QUETIAPINE FUMARATE 25 MG/1
50 TABLET, FILM COATED ORAL
COMMUNITY

## 2018-01-16 RX ORDER — QUETIAPINE FUMARATE 25 MG/1
50 TABLET, FILM COATED ORAL
Status: DISCONTINUED | OUTPATIENT
Start: 2018-01-17 | End: 2018-01-18 | Stop reason: HOSPADM

## 2018-01-16 RX ORDER — IBUPROFEN 200 MG
1 TABLET ORAL DAILY
Status: DISCONTINUED | OUTPATIENT
Start: 2018-01-16 | End: 2018-01-18 | Stop reason: HOSPADM

## 2018-01-16 RX ORDER — MELATONIN
4000
Status: DISCONTINUED | OUTPATIENT
Start: 2018-01-18 | End: 2018-01-17

## 2018-01-16 RX ORDER — DEXTROAMPHETAMINE SACCHARATE, AMPHETAMINE ASPARTATE, DEXTROAMPHETAMINE SULFATE AND AMPHETAMINE SULFATE 1.25; 1.25; 1.25; 1.25 MG/1; MG/1; MG/1; MG/1
15 TABLET ORAL 2 TIMES DAILY
Status: DISCONTINUED | OUTPATIENT
Start: 2018-01-17 | End: 2018-01-18 | Stop reason: HOSPADM

## 2018-01-16 RX ORDER — AZITHROMYCIN 250 MG/1
500 TABLET, FILM COATED ORAL
Status: COMPLETED | OUTPATIENT
Start: 2018-01-16 | End: 2018-01-16

## 2018-01-16 RX ORDER — SODIUM CHLORIDE 9 MG/ML
75 INJECTION, SOLUTION INTRAVENOUS CONTINUOUS
Status: DISCONTINUED | OUTPATIENT
Start: 2018-01-16 | End: 2018-01-17

## 2018-01-16 RX ORDER — MELATONIN
2000
Status: DISCONTINUED | OUTPATIENT
Start: 2018-01-17 | End: 2018-01-17

## 2018-01-16 RX ORDER — SODIUM CHLORIDE 0.9 % (FLUSH) 0.9 %
5-10 SYRINGE (ML) INJECTION EVERY 8 HOURS
Status: DISCONTINUED | OUTPATIENT
Start: 2018-01-16 | End: 2018-01-18 | Stop reason: HOSPADM

## 2018-01-16 RX ORDER — LORAZEPAM 1 MG/1
1 TABLET ORAL
Status: COMPLETED | OUTPATIENT
Start: 2018-01-16 | End: 2018-01-16

## 2018-01-16 RX ORDER — PREDNISONE 20 MG/1
60 TABLET ORAL
Status: DISCONTINUED | OUTPATIENT
Start: 2018-01-16 | End: 2018-01-16

## 2018-01-16 RX ORDER — SIMVASTATIN 5 MG/1
10 TABLET, FILM COATED ORAL
Status: DISCONTINUED | OUTPATIENT
Start: 2018-01-17 | End: 2018-01-18 | Stop reason: HOSPADM

## 2018-01-16 RX ORDER — ENOXAPARIN SODIUM 100 MG/ML
40 INJECTION SUBCUTANEOUS EVERY 12 HOURS
Status: DISCONTINUED | OUTPATIENT
Start: 2018-01-16 | End: 2018-01-18 | Stop reason: HOSPADM

## 2018-01-16 RX ORDER — ACETAMINOPHEN 325 MG/1
650 TABLET ORAL
Status: DISCONTINUED | OUTPATIENT
Start: 2018-01-16 | End: 2018-01-18 | Stop reason: HOSPADM

## 2018-01-16 RX ORDER — DILTIAZEM HYDROCHLORIDE 240 MG/1
240 CAPSULE, COATED, EXTENDED RELEASE ORAL DAILY
Status: DISCONTINUED | OUTPATIENT
Start: 2018-01-17 | End: 2018-01-18 | Stop reason: HOSPADM

## 2018-01-16 RX ADMIN — ACETAMINOPHEN 975 MG: 325 TABLET ORAL at 16:05

## 2018-01-16 RX ADMIN — ALBUTEROL SULFATE 1 DOSE: 2.5 SOLUTION RESPIRATORY (INHALATION) at 16:05

## 2018-01-16 RX ADMIN — Medication 10 ML: at 23:08

## 2018-01-16 RX ADMIN — IBUPROFEN 600 MG: 600 TABLET, FILM COATED ORAL at 16:05

## 2018-01-16 RX ADMIN — ENOXAPARIN SODIUM 40 MG: 40 INJECTION SUBCUTANEOUS at 21:56

## 2018-01-16 RX ADMIN — IPRATROPIUM BROMIDE AND ALBUTEROL SULFATE 3 ML: .5; 3 SOLUTION RESPIRATORY (INHALATION) at 21:56

## 2018-01-16 RX ADMIN — SIMVASTATIN 10 MG: 5 TABLET, FILM COATED ORAL at 23:33

## 2018-01-16 RX ADMIN — SODIUM CHLORIDE 75 ML/HR: 900 INJECTION, SOLUTION INTRAVENOUS at 23:06

## 2018-01-16 RX ADMIN — METHYLPREDNISOLONE SODIUM SUCCINATE 20 MG: 40 INJECTION, POWDER, FOR SOLUTION INTRAMUSCULAR; INTRAVENOUS at 23:33

## 2018-01-16 RX ADMIN — AZITHROMYCIN 500 MG: 250 TABLET, FILM COATED ORAL at 20:07

## 2018-01-16 RX ADMIN — QUETIAPINE FUMARATE 50 MG: 25 TABLET ORAL at 23:33

## 2018-01-16 RX ADMIN — ALBUTEROL SULFATE 1 DOSE: 2.5 SOLUTION RESPIRATORY (INHALATION) at 16:07

## 2018-01-16 RX ADMIN — CEFTRIAXONE SODIUM 1 G: 1 INJECTION, POWDER, FOR SOLUTION INTRAMUSCULAR; INTRAVENOUS at 20:07

## 2018-01-16 RX ADMIN — DEXAMETHASONE 10 MG: 4 TABLET ORAL at 17:03

## 2018-01-16 RX ADMIN — ALBUTEROL SULFATE 1 DOSE: 2.5 SOLUTION RESPIRATORY (INHALATION) at 15:52

## 2018-01-16 RX ADMIN — SODIUM CHLORIDE 1000 ML: 900 INJECTION, SOLUTION INTRAVENOUS at 20:07

## 2018-01-16 RX ADMIN — LORAZEPAM 1 MG: 1 TABLET ORAL at 20:07

## 2018-01-16 NOTE — ED PROVIDER NOTES
HPI Comments: This patient was evaluated on a Tuesday afternoon around 3:30. She started getting symptoms this past Friday. She started having cough for her to brown sputum, wheezing, mild chest burning, sore throat, myalgias and a temperature every day up to a maximum of 102 since Friday. She had a little diarrhea earlier. No vomiting. She admits to a little bit of nausea. She has lost her appetite. She smokes a pack a day for the past 15 years. She denies any history of hypertension, hyperlipidemia, asthma, COPD or emphysema. No leg swelling. No hemoptysis. No dyspnea on exertion. She tried some cough and cold medicine earlier today without much relief. No abdominal pain. The burning in her chest is worse with any coughing. Old chart reviewed - Cr in Oct 2017 was normal.  She was admitted then for sepsis/UTI, renal stone. Patient is a 66 y.o. female presenting with fever. Fever           Past Medical History:   Diagnosis Date    Anxiety     Depression 8/19/2010    HTN (hypertension)     Hyperlipidemia LDL goal < 130     Hypothyroid     Psychotic disorder     Severe depression and anxiety diagnosed at 35 yo    Tobacco abuse        Past Surgical History:   Procedure Laterality Date    HX APPENDECTOMY      HX DILATION AND CURETTAGE           No family history on file. Social History     Social History    Marital status:      Spouse name: N/A    Number of children: N/A    Years of education: N/A     Occupational History    Not on file.      Social History Main Topics    Smoking status: Current Every Day Smoker     Packs/day: 1.00    Smokeless tobacco: Never Used      Comment:  1 ppd/40+ years    Alcohol use No    Drug use: No      Comment: 1 pack a day    Sexual activity: No     Other Topics Concern    Not on file     Social History Narrative            2 children        Not employed/filing for disability    Stopped working after children were born 22 yo and 13 yo ALLERGIES: Egg    Review of Systems   Constitutional: Positive for fever. All other systems reviewed and are negative. Vitals:    01/16/18 1448 01/16/18 1526 01/16/18 1526   BP: 170/86     Pulse: 91     Resp: 20     Temp: (!) 101.2 °F (38.4 °C)     SpO2: 95%     Weight: 116.1 kg (256 lb) 114.8 kg (253 lb 1.4 oz) 114.8 kg (253 lb 1.4 oz)   Height: 5' 2\" (1.575 m)              Physical Exam   Constitutional: She appears well-developed and well-nourished. No distress. HENT:   Head: Normocephalic. Nose: Nose normal.   OP a little dry. Clear. Eyes: Conjunctivae are normal. Pupils are equal, round, and reactive to light. Neck: Neck supple. No JVD present. Cardiovascular: Normal rate, regular rhythm, normal heart sounds and intact distal pulses. Pulmonary/Chest: She is in respiratory distress (mild). She has wheezes (scattered bilateral, expiratory). Abdominal: Soft. She exhibits no distension. There is no tenderness. There is no rebound and no guarding. Musculoskeletal: She exhibits no edema. Neurological: She is alert. Skin: Skin is warm and dry. She is not diaphoretic. Psychiatric: She has a normal mood and affect. MDM  ED Course       Procedures      ED EKG interpretation:  Rhythm: normal sinus rhythm; and regular . Rate (approx.): 89; Axis: normal; P wave: normal; QRS interval: normal ; ST/T wave: normal;  This EKG was interpreted by Elizabeth Benson DO,ED Provider. cxr reviewed  APAP, ibuprofen given  Nebs  Could have influenza  Has PNA on today's CXR             7:19 PM - still wheezing. Walked her myself around the nurses station. Very dyspneic, wheezing, labored breathing. I don't think she should go home. Will treat for CAP, test for flu, needs frequent nebs inpt. Pulse ox down to 92% for nurse while walking earlier.         Recent Results (from the past 12 hour(s))   LACTIC ACID    Collection Time: 01/17/18 12:32 AM   Result Value Ref Range    Lactic acid 2.5 (HH) 0.4 - 2.0 MMOL/L   CBC W/O DIFF    Collection Time: 01/17/18 12:32 AM   Result Value Ref Range    WBC 4.7 3.6 - 11.0 K/uL    RBC 4.23 3.80 - 5.20 M/uL    HGB 13.2 11.5 - 16.0 g/dL    HCT 40.5 35.0 - 47.0 %    MCV 95.7 80.0 - 99.0 FL    MCH 31.2 26.0 - 34.0 PG    MCHC 32.6 30.0 - 36.5 g/dL    RDW 13.6 11.5 - 14.5 %    PLATELET 184 769 - 342 K/uL   METABOLIC PANEL, BASIC    Collection Time: 01/17/18 12:32 AM   Result Value Ref Range    Sodium 140 136 - 145 mmol/L    Potassium 3.4 (L) 3.5 - 5.1 mmol/L    Chloride 103 97 - 108 mmol/L    CO2 24 21 - 32 mmol/L    Anion gap 13 5 - 15 mmol/L    Glucose 226 (H) 65 - 100 mg/dL    BUN 10 6 - 20 MG/DL    Creatinine 1.18 (H) 0.55 - 1.02 MG/DL    BUN/Creatinine ratio 8 (L) 12 - 20      GFR est AA 57 (L) >60 ml/min/1.73m2    GFR est non-AA 47 (L) >60 ml/min/1.73m2    Calcium 8.9 8.5 - 10.1 MG/DL   LACTIC ACID    Collection Time: 01/17/18  6:47 AM   Result Value Ref Range    Lactic acid 1.7 0.4 - 2.0 MMOL/L

## 2018-01-16 NOTE — IP AVS SNAPSHOT
[facility] [place] 
 
 
 [address] [facility] 
[phone] Patient: Monico Drake MRN: [id number] KSI:[id number] About your hospitalization You were admitted on:  January 16, 2018 You last received care in the:  [facility]  MED SURG 2 You were discharged on:  January 18, 2018 Why you were hospitalized Your primary diagnosis was:  Pneumonia Your diagnoses also included:  Depression, Hypercholesteremia, Tobacco Abuse, Hypothyroid, Oc (Acute Kidney Injury) (Hcc), Htn (Hypertension), Obesity, Lactic Acid Acidosis, Fever, Anxiety, Sinus Tachycardia, Sepsis (Hcc) Follow-up Information Follow up With Details Comments Contact Info Umu Meléndez MD   [address] [facility] [address] [facility] [address] [place] [address] 
[phone] Discharge Orders None A check asia indicates which time of day the medication should be taken. My Medications CONTINUE taking these medications Instructions Each Dose to Equal  
 Morning Noon Evening Bedtime  
 dextroamphetamine-amphetamine 15 mg tablet Commonly known as:  ADDERALL Your last dose was: Your next dose is: Take 1 Tab (15 mg total) by mouth two (2) times a day. Max Daily Amount: 30 mg  
 15 mg  
    
   
   
   
  
 dilTIAZem  mg ER capsule Commonly known as:  CARDIZEM CD Your last dose was: Your next dose is: Take 1 Cap by mouth daily for 90 days. Indications: hypertension 240 mg  
    
   
   
   
  
 EFFEXOR XR 75 mg capsule Generic drug:  venlafaxine-SR Your last dose was: Your next dose is: Take 225 mg by mouth daily. 225 mg LORazepam 1 mg tablet Commonly known as:  ATIVAN Your last dose was: Your next dose is: Take 1 mg by mouth two (2) times daily as needed for Anxiety. 1 mg SEROquel 25 mg tablet Generic drug:  QUEtiapine Your last dose was: Your next dose is: Take 50 mg by mouth nightly. 50 mg  
    
   
   
   
  
 simvastatin 10 mg tablet Commonly known as:  ZOCOR Your last dose was: Your next dose is: TAKE 1 TABLET BY MOUTH EVERY NIGHT SYNTHROID 112 mcg tablet Generic drug:  levothyroxine Your last dose was: Your next dose is: Take 112 mcg by mouth Daily (before breakfast). 112 mcg * VITAMIN D3 2,000 unit Tab Generic drug:  cholecalciferol (vitamin D3) Your last dose was: Your next dose is: Take 2,000 Units by mouth five (5) days a week. Monday, Tuesday, Wednesday, Thursday, Friday  
 2000 Units * VITAMIN D3 2,000 unit Tab Generic drug:  cholecalciferol (vitamin D3) Your last dose was: Your next dose is: Take 4,000 Units by mouth two (2) days a week. Saturday and Sunday 4000 Units * Notice: This list has 2 medication(s) that are the same as other medications prescribed for you. Read the directions carefully, and ask your doctor or other care provider to review them with you. Discharge Instructions Pneumonia: Care Instructions Your Care Instructions Pneumonia is an infection of the lungs. Most cases are caused by infections from bacteria or viruses. Pneumonia may be mild or very severe. If it is caused by bacteria, you will be treated with antibiotics. It may take a few weeks to a few months to recover fully from pneumonia, depending on how sick you were and whether your overall health is good. Follow-up care is a key part of your treatment and safety.  Be sure to make and go to all appointments, and call your doctor if you are having problems. It's also a good idea to know your test results and keep a list of the medicines you take. How can you care for yourself at home? · Take your antibiotics exactly as directed. Do not stop taking the medicine just because you are feeling better. You need to take the full course of antibiotics. · Take your medicines exactly as prescribed. Call your doctor if you think you are having a problem with your medicine. · Get plenty of rest and sleep. You may feel weak and tired for a while, but your energy level will improve with time. · To prevent dehydration, drink plenty of fluids, enough so that your urine is light yellow or clear like water. Choose water and other caffeine-free clear liquids until you feel better. If you have kidney, heart, or liver disease and have to limit fluids, talk with your doctor before you increase the amount of fluids you drink. · Take care of your cough so you can rest. A cough that brings up mucus from your lungs is common with pneumonia. It is one way your body gets rid of the infection. But if coughing keeps you from resting or causes severe fatigue and chest-wall pain, talk to your doctor. He or she may suggest that you take a medicine to reduce the cough. · Use a vaporizer or humidifier to add moisture to your bedroom. Follow the directions for cleaning the machine. · Do not smoke or allow others to smoke around you. Smoke will make your cough last longer. If you need help quitting, talk to your doctor about stop-smoking programs and medicines. These can increase your chances of quitting for good. · Take an over-the-counter pain medicine, such as acetaminophen (Tylenol), ibuprofen (Advil, Motrin), or naproxen (Aleve). Read and follow all instructions on the label. · Do not take two or more pain medicines at the same time unless the doctor told you to. Many pain medicines have acetaminophen, which is Tylenol. Too much acetaminophen (Tylenol) can be harmful. · If you were given a spirometer to measure how well your lungs are working, use it as instructed. This can help your doctor tell how your recovery is going. · To prevent pneumonia in the future, talk to your doctor about getting a flu vaccine (once a year) and a pneumococcal vaccine (one time only for most people). When should you call for help? Call 911 anytime you think you may need emergency care. For example, call if: 
? · You have severe trouble breathing. ?Call your doctor now or seek immediate medical care if: 
? · You cough up dark brown or bloody mucus (sputum). ? · You have new or worse trouble breathing. ? · You are dizzy or lightheaded, or you feel like you may faint. ? Watch closely for changes in your health, and be sure to contact your doctor if: 
? · You have a new or higher fever. ? · You are coughing more deeply or more often. ? · You are not getting better after 2 days (48 hours). ? · You do not get better as expected. Where can you learn more? Go to http://lavelle-rhys.info/. Enter [id number] in the search box to learn more about \"Pneumonia: Care Instructions. \" Current as of: May 12, 2017 Content Version: 11.4 © 2731-8567 [employer]. Care instructions adapted under license by [employer] (which disclaims liability or warranty for this information). If you have questions about a medical condition or this instruction, always ask your healthcare professional. [place] [address] any warranty or liability for your use of this information. Stopping Smoking: Care Instructions Your Care Instructions Cigarette smokers crave the nicotine in cigarettes. Giving it up is much harder than simply changing a habit. Your body has to stop craving the nicotine. It is hard to quit, but you can do it. There are many tools that people use to quit smoking. You may find that combining tools works best for you. There are several steps to quitting. First you get ready to quit. Then you get support to help you. After that, you learn new skills and behaviors to become a nonsmoker. For many people, a necessary step is getting and using medicine. Your doctor will help you set up the plan that best meets your needs. You may want to attend a smoking cessation program to help you quit smoking. When you choose a program, look for one that has proven success. Ask your doctor for ideas. You will greatly increase your chances of success if you take medicine as well as get counseling or join a cessation program. 
Some of the changes you feel when you first quit tobacco are uncomfortable. Your body will miss the nicotine at first, and you may feel short-tempered and grumpy. You may have trouble sleeping or concentrating. Medicine can help you deal with these symptoms. You may struggle with changing your smoking habits and rituals. The last step is the tricky one: Be prepared for the smoking urge to continue for a time. This is a lot to deal with, but keep at it. You will feel better. Follow-up care is a key part of your treatment and safety. Be sure to make and go to all appointments, and call your doctor if you are having problems. It's also a good idea to know your test results and keep a list of the medicines you take. How can you care for yourself at home? · Ask your family, friends, and coworkers for support. You have a better chance of quitting if you have help and support. · Join a support group, such as Nicotine Anonymous, for people who are trying to quit smoking. · Consider signing up for a smoking cessation program, such as the American Lung Association's Freedom from Smoking program. 
· Set a quit date. Pick your date carefully so that it is not right in the middle of a big deadline or stressful time. Once you quit, do not even take a puff.  Get rid of all ashtrays and lighters after your last cigarette. Clean your house and your clothes so that they do not smell of smoke. · Learn how to be a nonsmoker. Think about ways you can avoid those things that make you reach for a cigarette. ¨ Avoid situations that put you at greatest risk for smoking. For some people, it is hard to have a drink with friends without smoking. For others, they might skip a coffee break with coworkers who smoke. ¨ Change your daily routine. Take a different route to work or eat a meal in a different place. · Cut down on stress. Calm yourself or release tension by doing an activity you enjoy, such as reading a book, taking a hot bath, or gardening. · Talk to your doctor or pharmacist about nicotine replacement therapy, which replaces the nicotine in your body. You still get nicotine but you do not use tobacco. Nicotine replacement products help you slowly reduce the amount of nicotine you need. These products come in several forms, many of them available over-the-counter: ¨ Nicotine patches ¨ Nicotine gum and lozenges ¨ Nicotine inhaler · Ask your doctor about bupropion (Wellbutrin) or varenicline (Chantix), which are prescription medicines. They do not contain nicotine. They help you by reducing withdrawal symptoms, such as stress and anxiety. · Some people find hypnosis, acupuncture, and massage helpful for ending the smoking habit. · Eat a healthy diet and get regular exercise. Having healthy habits will help your body move past its craving for nicotine. · Be prepared to keep trying. Most people are not successful the first few times they try to quit. Do not get mad at yourself if you smoke again. Make a list of things you learned and think about when you want to try again, such as next week, next month, or next year. Where can you learn more? Go to http://lavelle-rhys.info/. Enter W336 in the search box to learn more about \"Stopping Smoking: Care Instructions. \" Current as of: March 20, 2017 Content Version: 11.4 © 2613-2609 [employer]. Care instructions adapted under license by [employer] (which disclaims liability or warranty for this information). If you have questions about a medical condition or this instruction, always ask your healthcare professional. [address] any warranty or liability for your use of this information. Introducing \[facility] [place] [facility]\"" & HEALTH SERVICES! Dear [employer]: Thank you for requesting a Simio account. Our records indicate that you already have an active Simio account. You can access your account anytime at [email or web address] Did you know that you can access your hospital and ER discharge instructions at any time in Simio? You can also review all of your test results from your hospital stay or ER visit. Additional Information If you have questions, please visit the Frequently Asked Questions section of the Simio website at [email or web address] Remember, Simio is NOT to be used for urgent needs. For medical emergencies, dial 911. Now available from your iPhone and Android! Unresulted Labs-Please follow up with your PCP about these lab tests Order Current Status LEGIONELLA PNEUMOPHILA AG, URINE In process MYCOPLASMA AB, IGG/IGM In process S. PNEUMO AG, SERUM ONLY In process S. PNEUMO AG, UR/CSF In process CULTURE, BLOOD, PAIRED Preliminary result Providers Seen During Your Hospitalization Provider Specialty Primary office phone Radha Cote DO Emergency Medicine [phone] Clemencia Babin MD Internal Medicine [phone] Anabell Mitchell MD Internal Medicine [phone] Your Primary Care Physician (PCP) Primary Care Physician Office Phone Office Fax Aleja Latah [phone] You are allergic to the following Allergen Reactions Egg Other (comments) Abdominal pain Recent Documentation Height Weight BMI OB Status Smoking Status 1.575 m 114.8 kg 46.29 kg/m2 Postmenopausal Current Every Day Smoker Emergency Contacts Name Discharge Info Relation Home Work Mobile [address] CAREGIVER [3] Spouse [3] [phone] Wong William CAREGIVER [3] Mother [14] [phone] Patient Belongings The following personal items are in your possession at time of discharge: 
  Dental Appliances: None  Visual Aid: None      Home Medications: None   Jewelry: None  Clothing: At bedside, Footwear, Pants, Shirt, Socks, Undergarments    Other Valuables: Eyeglasses, [employer] Please provide this summary of care documentation to your next provider. Signatures-by signing, you are acknowledging that this After Visit Summary has been reviewed with you and you have received a copy. Patient Signature:  ____________________________________________________________ Date:  ____________________________________________________________  
  
Veena Hurst Provider Signature:  ____________________________________________________________ Date:  ____________________________________________________________

## 2018-01-17 PROBLEM — R50.9 FEVER: Status: ACTIVE | Noted: 2018-01-17

## 2018-01-17 PROBLEM — R00.0 SINUS TACHYCARDIA: Status: ACTIVE | Noted: 2018-01-17

## 2018-01-17 PROBLEM — E87.1 HYPONATREMIA: Status: RESOLVED | Noted: 2017-09-30 | Resolved: 2018-01-17

## 2018-01-17 PROBLEM — E87.20 LACTIC ACID ACIDOSIS: Status: ACTIVE | Noted: 2018-01-17

## 2018-01-17 LAB
ANION GAP SERPL CALC-SCNC: 13 MMOL/L (ref 5–15)
ATRIAL RATE: 89 BPM
B PERT DNA SPEC QL NAA+PROBE: NOT DETECTED
BUN SERPL-MCNC: 10 MG/DL (ref 6–20)
BUN/CREAT SERPL: 8 (ref 12–20)
C PNEUM DNA SPEC QL NAA+PROBE: NOT DETECTED
CALCIUM SERPL-MCNC: 8.9 MG/DL (ref 8.5–10.1)
CALCULATED P AXIS, ECG09: 65 DEGREES
CALCULATED R AXIS, ECG10: 41 DEGREES
CALCULATED T AXIS, ECG11: 59 DEGREES
CHLORIDE SERPL-SCNC: 103 MMOL/L (ref 97–108)
CO2 SERPL-SCNC: 24 MMOL/L (ref 21–32)
CREAT SERPL-MCNC: 1.18 MG/DL (ref 0.55–1.02)
DIAGNOSIS, 93000: NORMAL
ERYTHROCYTE [DISTWIDTH] IN BLOOD BY AUTOMATED COUNT: 13.6 % (ref 11.5–14.5)
FLUAV H1 2009 PAND RNA SPEC QL NAA+PROBE: NOT DETECTED
FLUAV H1 RNA SPEC QL NAA+PROBE: NOT DETECTED
FLUAV H3 RNA SPEC QL NAA+PROBE: NOT DETECTED
FLUAV SUBTYP SPEC NAA+PROBE: NOT DETECTED
FLUBV RNA SPEC QL NAA+PROBE: NOT DETECTED
GLUCOSE SERPL-MCNC: 226 MG/DL (ref 65–100)
HADV DNA SPEC QL NAA+PROBE: NOT DETECTED
HCOV 229E RNA SPEC QL NAA+PROBE: NOT DETECTED
HCOV HKU1 RNA SPEC QL NAA+PROBE: NOT DETECTED
HCOV NL63 RNA SPEC QL NAA+PROBE: NOT DETECTED
HCOV OC43 RNA SPEC QL NAA+PROBE: NOT DETECTED
HCT VFR BLD AUTO: 40.5 % (ref 35–47)
HGB BLD-MCNC: 13.2 G/DL (ref 11.5–16)
HMPV RNA SPEC QL NAA+PROBE: NOT DETECTED
HPIV1 RNA SPEC QL NAA+PROBE: NOT DETECTED
HPIV2 RNA SPEC QL NAA+PROBE: NOT DETECTED
HPIV3 RNA SPEC QL NAA+PROBE: NOT DETECTED
HPIV4 RNA SPEC QL NAA+PROBE: NOT DETECTED
LACTATE SERPL-SCNC: 1.7 MMOL/L (ref 0.4–2)
LACTATE SERPL-SCNC: 2.5 MMOL/L (ref 0.4–2)
M PNEUMO DNA SPEC QL NAA+PROBE: NOT DETECTED
MCH RBC QN AUTO: 31.2 PG (ref 26–34)
MCHC RBC AUTO-ENTMCNC: 32.6 G/DL (ref 30–36.5)
MCV RBC AUTO: 95.7 FL (ref 80–99)
P-R INTERVAL, ECG05: 144 MS
PLATELET # BLD AUTO: 189 K/UL (ref 150–400)
POTASSIUM SERPL-SCNC: 3.4 MMOL/L (ref 3.5–5.1)
Q-T INTERVAL, ECG07: 382 MS
QRS DURATION, ECG06: 90 MS
QTC CALCULATION (BEZET), ECG08: 464 MS
RBC # BLD AUTO: 4.23 M/UL (ref 3.8–5.2)
RSV RNA SPEC QL NAA+PROBE: DETECTED
RV+EV RNA SPEC QL NAA+PROBE: NOT DETECTED
SODIUM SERPL-SCNC: 140 MMOL/L (ref 136–145)
VENTRICULAR RATE, ECG03: 89 BPM
WBC # BLD AUTO: 4.7 K/UL (ref 3.6–11)

## 2018-01-17 PROCEDURE — 74011000258 HC RX REV CODE- 258: Performed by: INTERNAL MEDICINE

## 2018-01-17 PROCEDURE — 77010033678 HC OXYGEN DAILY

## 2018-01-17 PROCEDURE — 87798 DETECT AGENT NOS DNA AMP: CPT | Performed by: INTERNAL MEDICINE

## 2018-01-17 PROCEDURE — 74011250636 HC RX REV CODE- 250/636: Performed by: INTERNAL MEDICINE

## 2018-01-17 PROCEDURE — 74011000250 HC RX REV CODE- 250: Performed by: INTERNAL MEDICINE

## 2018-01-17 PROCEDURE — 77030038269 HC DRN EXT URIN PURWCK BARD -A

## 2018-01-17 PROCEDURE — 83605 ASSAY OF LACTIC ACID: CPT | Performed by: INTERNAL MEDICINE

## 2018-01-17 PROCEDURE — 85027 COMPLETE CBC AUTOMATED: CPT | Performed by: INTERNAL MEDICINE

## 2018-01-17 PROCEDURE — 87449 NOS EACH ORGANISM AG IA: CPT | Performed by: INTERNAL MEDICINE

## 2018-01-17 PROCEDURE — 36415 COLL VENOUS BLD VENIPUNCTURE: CPT | Performed by: INTERNAL MEDICINE

## 2018-01-17 PROCEDURE — 74011250637 HC RX REV CODE- 250/637: Performed by: INTERNAL MEDICINE

## 2018-01-17 PROCEDURE — 65270000029 HC RM PRIVATE

## 2018-01-17 PROCEDURE — 94640 AIRWAY INHALATION TREATMENT: CPT

## 2018-01-17 PROCEDURE — 80048 BASIC METABOLIC PNL TOTAL CA: CPT | Performed by: INTERNAL MEDICINE

## 2018-01-17 PROCEDURE — 87899 AGENT NOS ASSAY W/OPTIC: CPT | Performed by: INTERNAL MEDICINE

## 2018-01-17 RX ORDER — DEXTROSE, SODIUM CHLORIDE, AND POTASSIUM CHLORIDE 5; .45; .3 G/100ML; G/100ML; G/100ML
INJECTION INTRAVENOUS CONTINUOUS
Status: DISCONTINUED | OUTPATIENT
Start: 2018-01-17 | End: 2018-01-18

## 2018-01-17 RX ADMIN — IPRATROPIUM BROMIDE AND ALBUTEROL SULFATE 3 ML: .5; 3 SOLUTION RESPIRATORY (INHALATION) at 20:46

## 2018-01-17 RX ADMIN — ENOXAPARIN SODIUM 40 MG: 40 INJECTION SUBCUTANEOUS at 22:39

## 2018-01-17 RX ADMIN — IPRATROPIUM BROMIDE AND ALBUTEROL SULFATE 3 ML: .5; 3 SOLUTION RESPIRATORY (INHALATION) at 08:55

## 2018-01-17 RX ADMIN — DEXTROSE MONOHYDRATE, SODIUM CHLORIDE, AND POTASSIUM CHLORIDE: 50; 4.5; 2.98 INJECTION, SOLUTION INTRAVENOUS at 11:35

## 2018-01-17 RX ADMIN — DEXTROAMPHETAMINE SACCHARATE, AMPHETAMINE ASPARTATE, DEXTROAMPHETAMINE SULFATE AND AMPHETAMINE SULFATE 15 MG: 1.25; 1.25; 1.25; 1.25 TABLET ORAL at 17:02

## 2018-01-17 RX ADMIN — LEVOTHYROXINE SODIUM 112 MCG: 112 TABLET ORAL at 06:47

## 2018-01-17 RX ADMIN — METHYLPREDNISOLONE SODIUM SUCCINATE 20 MG: 40 INJECTION, POWDER, FOR SOLUTION INTRAMUSCULAR; INTRAVENOUS at 06:47

## 2018-01-17 RX ADMIN — ENOXAPARIN SODIUM 40 MG: 40 INJECTION SUBCUTANEOUS at 09:51

## 2018-01-17 RX ADMIN — ACETAMINOPHEN 650 MG: 325 TABLET ORAL at 22:39

## 2018-01-17 RX ADMIN — LORAZEPAM 1 MG: 1 TABLET ORAL at 00:37

## 2018-01-17 RX ADMIN — ACETAMINOPHEN 650 MG: 325 TABLET ORAL at 12:22

## 2018-01-17 RX ADMIN — AZITHROMYCIN MONOHYDRATE 500 MG: 500 INJECTION, POWDER, LYOPHILIZED, FOR SOLUTION INTRAVENOUS at 23:26

## 2018-01-17 RX ADMIN — DEXTROAMPHETAMINE SACCHARATE, AMPHETAMINE ASPARTATE, DEXTROAMPHETAMINE SULFATE AND AMPHETAMINE SULFATE 15 MG: 1.25; 1.25; 1.25; 1.25 TABLET ORAL at 09:51

## 2018-01-17 RX ADMIN — VENLAFAXINE HYDROCHLORIDE 225 MG: 150 CAPSULE, EXTENDED RELEASE ORAL at 09:51

## 2018-01-17 RX ADMIN — IPRATROPIUM BROMIDE AND ALBUTEROL SULFATE 3 ML: .5; 3 SOLUTION RESPIRATORY (INHALATION) at 02:00

## 2018-01-17 RX ADMIN — Medication 10 ML: at 06:47

## 2018-01-17 RX ADMIN — LORAZEPAM 1 MG: 1 TABLET ORAL at 12:25

## 2018-01-17 RX ADMIN — SIMVASTATIN 10 MG: 5 TABLET, FILM COATED ORAL at 22:39

## 2018-01-17 RX ADMIN — LORAZEPAM 1 MG: 1 TABLET ORAL at 22:40

## 2018-01-17 RX ADMIN — CEFTRIAXONE SODIUM 1 G: 1 INJECTION, POWDER, FOR SOLUTION INTRAMUSCULAR; INTRAVENOUS at 22:38

## 2018-01-17 RX ADMIN — IPRATROPIUM BROMIDE AND ALBUTEROL SULFATE 3 ML: .5; 3 SOLUTION RESPIRATORY (INHALATION) at 13:55

## 2018-01-17 RX ADMIN — Medication 10 ML: at 22:39

## 2018-01-17 RX ADMIN — QUETIAPINE FUMARATE 50 MG: 25 TABLET ORAL at 22:40

## 2018-01-17 RX ADMIN — DILTIAZEM HYDROCHLORIDE 240 MG: 240 CAPSULE, COATED, EXTENDED RELEASE ORAL at 09:52

## 2018-01-17 NOTE — ROUTINE PROCESS
TRANSFER - IN REPORT:    Verbal report received from Ezra(name) on Yuan Moody  being received from ED(unit) for routine progression of care      Report consisted of patients Situation, Background, Assessment and   Recommendations(SBAR). Information from the following report(s) SBAR was reviewed with the receiving nurse. Opportunity for questions and clarification was provided. Assessment completed upon patients arrival to unit and care assumed.

## 2018-01-17 NOTE — ED NOTES
TRANSFER - OUT REPORT:    Verbal report given to Za (name) on Nacho Donald  being transferred to 5th Floor (unit) for routine progression of care       Report consisted of patients Situation, Background, Assessment and   Recommendations(SBAR). Information from the following report(s) SBAR, ED Summary, [facility] and Recent Results was reviewed with the receiving nurse. Lines:   Peripheral IV 01/16/18 Left Antecubital (Active)   Site Assessment Clean, dry, & intact 1/16/2018  7:40 PM   Phlebitis Assessment 0 1/16/2018  7:40 PM   Infiltration Assessment 0 1/16/2018  7:40 PM   Dressing Status Clean, dry, & intact 1/16/2018  7:40 PM   Dressing Type Transparent 1/16/2018  7:40 PM   Hub Color/Line Status Pink 1/16/2018  7:40 PM   Action Taken Catheter retaped 1/16/2018  7:40 PM        Opportunity for questions and clarification was provided.       Patient transported with:   Monitor  [employer]

## 2018-01-17 NOTE — ROUTINE PROCESS
Bedside shift change report given to Nickolas0 Heber Mansfield (oncoming nurse) by Carloz Gerardo (offgoing nurse). Report included the following information SBAR.

## 2018-01-17 NOTE — PROGRESS NOTES
Pharmacy Dosing Services:     Lovenox dose increased from 40 mg SQ Q24h to 40 mg SQ Q12h per P&T Protocol.  - CrCl > 30 ml/min, BMI > 40    Thank you,  Dereck Diana PharmD

## 2018-01-17 NOTE — PROGRESS NOTES
Nutrition Assessment:    RECOMMENDATIONS/INTERVENTION(S):   Recommend no added salt (3-4 gm Na)/ Consistent Carb diet. No recent A1C- 6.1 in 2012. Monitor PO intakes, wt, GI status, BG  Monitor need for ONS    ASSESSMENT:   1/17: 66 yr old female admitted for fever. PNA. PMHx: depression, smoker, HTN, JING, thyroid, hypercholesterolemia. Pt had fever 101.2 F. Productive cough. Loose stools. Pt appetite depressed. Wt loss over last 3 months of 24 lbs (8.6%) per chart. No edema. Pt on solumedrol , 177 mg/dL. No recent A1c K+ 3.4 GFR 47. No intakes documented. Pt not interested in diet/lifestyle changes. No history of DM in chart, A1c in April 2012 was 6.1. SUBJECTIVE/OBJECTIVE:   Diet Order: Regular  % Eaten:  No data found. Pertinent Medications: [x] Reviewed    Past Medical History:   Diagnosis Date    Anxiety     Depression 8/19/2010    HTN (hypertension)     Hyperlipidemia LDL goal < 130     Hypothyroid     Psychotic disorder     Severe depression and anxiety diagnosed at 35 yo    Tobacco abuse         Chemistries:  Lab Results   Component Value Date/Time    Sodium 140 01/17/2018 12:32 AM    Potassium 3.4 01/17/2018 12:32 AM    Chloride 103 01/17/2018 12:32 AM    CO2 24 01/17/2018 12:32 AM    Anion gap 13 01/17/2018 12:32 AM    Glucose 226 01/17/2018 12:32 AM    BUN 10 01/17/2018 12:32 AM    Creatinine 1.18 01/17/2018 12:32 AM    BUN/Creatinine ratio 8 01/17/2018 12:32 AM    GFR est AA 57 01/17/2018 12:32 AM    GFR est non-AA 47 01/17/2018 12:32 AM    Calcium 8.9 01/17/2018 12:32 AM    AST (SGOT) 30 10/13/2017 11:20 AM    Alk.  phosphatase 165 10/13/2017 11:20 AM    Protein, total 6.6 10/13/2017 11:20 AM    Albumin 3.8 10/13/2017 11:20 AM    Globulin 4.0 10/02/2017 04:51 AM    A-G Ratio 1.4 10/13/2017 11:20 AM    ALT (SGPT) 21 10/13/2017 11:20 AM      Anthropometrics: Height: 5' 2\" (157.5 cm) Weight: 114.8 kg (253 lb 1.4 oz)    IBW (%IBW):   ( ) UBW (%UBW):   (  %)    BMI: Body mass index is 46.29 kg/(m^2). This BMI is indicative of:     [] Underweight    [] Normal    [] Overweight    []  Obesity    [x]  Extreme Obesity (BMI>40)    Estimated Nutrition Needs (Based on): 1949 Kcals/day (BMR(1691x1.3)-250) , 92 g (-115g/day(0.8-1.0g/kg)) Protein  Carbohydrate: At Least 130 g/day  Fluids: 1950 mL/day    Last BM: 1/17- loose   [x]Active     []Hyperactive  []Hypoactive       [] Absent   BS  Skin:    [x] Intact   [] Incision  [] Breakdown   [] DTI   [] Tears/Excoriation/Abrasion  []Edema [] Other: Wt Readings from Last 30 Encounters:   01/16/18 114.8 kg (253 lb 1.4 oz)   10/13/17 119.7 kg (264 lb)   10/12/17 125.6 kg (277 lb)   10/07/17 125.9 kg (277 lb 8 oz)   09/28/17 123.4 kg (272 lb)   06/08/17 125.6 kg (277 lb)   05/23/17 122 kg (269 lb)   04/18/17 124.3 kg (274 lb)   03/16/17 121.6 kg (268 lb)   02/02/17 119.5 kg (263 lb 6.4 oz)   01/20/17 120.7 kg (266 lb)   10/26/16 121.6 kg (268 lb)   07/14/16 122.9 kg (271 lb)   05/24/16 122.9 kg (271 lb)   04/19/16 120.7 kg (266 lb)   04/13/16 120.7 kg (266 lb)   12/04/15 126.6 kg (279 lb)   09/10/15 126.6 kg (279 lb)   06/04/15 125.6 kg (276 lb 12.8 oz)   12/02/14 127.5 kg (281 lb)   09/19/14 126.1 kg (278 lb)   09/11/14 124.1 kg (273 lb 9.6 oz)   09/04/14 121.1 kg (267 lb)   08/21/14 121.2 kg (267 lb 3.2 oz)   08/15/14 125.6 kg (277 lb)   07/16/14 125.2 kg (276 lb)   06/11/14 123.8 kg (273 lb)   06/04/14 123.8 kg (273 lb)   10/26/12 125.6 kg (277 lb)   09/10/12 121.6 kg (268 lb)      NUTRITION DIAGNOSES:   Problem:  Unintended weight loss     Etiology: related to decreased ability to consume sufficient energy     Signs/Symptoms: as evidenced by PTA poor appetite, wt loss 25 lbs, fever, nausea.        NUTRITION INTERVENTIONS:  Meals/Snacks: General/healthful diet   Supplements: Commercial supplement              GOAL:   Pt will consume >50% of meals and ONS within 3-5 days    Cultural, Yarsani, or Ethnic Dietary Needs: None     LEARNING NEEDS (Diet, Food/Nutrient-Drug Interaction):    [x] None Identified   [] Identified and Education Provided/Documented   [] Identified and Pt declined/was not appropriate      [x] Interdisciplinary Care Plan Reviewed/Documented    [x] Discharge Needs:    TBD   [] No Nutrition Related Discharge Needs    NUTRITION RISK:   Pt Is At Nutrition Risk  [x]     No Nutrition Risk Identified  []       PT SEEN FOR:    []  MD Consult: []Calorie Count      []Diabetic Diet Education        []Diet Education     []Electrolyte Management     []General Nutrition Management and Supplements     []Management of Tube Feeding     []TPN Recommendations    [x]  RN Referral:  [x]MST score >=2     []Enteral/Parenteral Nutrition PTA     []Pregnant: Gestational DM or Multigestation                 [] Pressure Ulcer      []  Low BMI      []  Length of Stay       [] Dysphagia Diet     [] Ventilator      [] Follow-Up        Previous Recommendations:   [] Implemented          [] Not Implemented          [x] Not Applicable    Previous Goal:   [] Met              [] Progressing Towards Goal              [] Not Progressing Towards Goal   [x] Not Applicable              Keon Hall, [address] [place] [address]  Pager: [phone]  Office: [phone]

## 2018-01-17 NOTE — PROGRESS NOTES
[facility] [place] [address]  [address] [facility], [address] [place] [address]  [phone]      Medical Progress Note      NAME: Yari Mcmillan   :  1960  MRM:  [id number]    Date/Time: 2018  10:11 AM       Assessment and Plan:     Pneumonia - POA, possible. She has cough, fever and slightly abnormal xray. No WBC. Check blood cx and urine serologies, and for now continue empiric ceftriaxone and azithromycin. However, I suspect she has flu, missed by inaccurate ER swab. Check PCR viral panel. Droplet precautions until result back. Prn duonebs are only barely helping, but stop steroids. Sepsis / Fever / Sinus tachycardia - POA, due to PNA vs other infection. SEVERE sepsis on admission based on ARF and lactic acid. Sepsis protocol initiated in ER. Now both ARF and lactic acid improved. Now good cap refill and pulses. Continue IVF and follow cx. JING (acute kidney injury) / Lactic acid acidosis / Dehydration / hypokalemia - POA, related to illness. Improving with IVF. Low K driven by nebs. Follow. HTN (hypertension) - continue diltiazem for BP and rate control    Hypothyroid - Continue synthroid. Morbid Obesity / ELMER on CPAP - Advise weight loss. Continue home CPAP, which is at bedside. Depression / Anxiety - Continue adderall, ativan, quetiapine, seroquel and venlafaxine    Hypercholesteremia - continue simvastatin    Tobacco abuse - Advised cessation. Spent 5 minutes in addition to all other time spent on admission, noted below. Will provide patch. Subjective:     Chief Complaint:  Breathing is slightly improved. Fever gone    ROS:  (bold if positive, if negative)    CoughSOB/GORDILLO  Tolerating some PT  Tolerating some Diet        Objective:     Last 24hrs VS reviewed since prior progress note.  Most recent are:    Visit Vitals    /82 (BP 1 Location: Right arm, BP Patient Position: At rest)    Pulse 79    Temp 98.4 °F (36.9 °C)    Resp 22    Ht 5' 2\" (1.575 m)    Wt 114.8 kg (253 lb 1.4 oz)    SpO2 98%    BMI 46.29 kg/m2     SpO2 Readings from Last 6 Encounters:   01/17/18 98%   10/13/17 95%   10/13/17 95%   10/12/17 96%   10/10/17 93%   09/28/17 93%    O2 Flow Rate (L/min): 2 l/min   No intake or output data in the 24 hours ending 01/17/18 1011     Physical Exam:    Gen:  Morbid obese, in no acute distress  HEENT:  Pink conjunctivae, PERRL, hearing intact to voice, moist mucous membranes  Neck:  Supple, without masses, thyroid non-tender  Resp:  No accessory muscle use, bilateral breath sounds with wheezes and rhonchi  Card:  No murmurs, normal S1, S2 without thrills, bruits or peripheral edema  Abd:  Soft, non-tender, non-distended, normoactive bowel sounds are present, no mass  Lymph:  No cervical or inguinal adenopathy  Musc:  No cyanosis or clubbing  Skin:  No rashes or ulcers, skin turgor is good  Neuro:  Cranial nerves are grossly intact, mild motor weakness, follows commands appropriately  Psych:  Good insight, oriented to person, place and time, alert    Telemetry reviewed:   normal sinus rhythm  __________________________________________________________________  Medications Reviewed: (see below)  Medications:     Current Facility-Administered Medications   Medication Dose Route Frequency    dextrose 5% - 0.45% NaCl with KCl 40 mEq/L infusion   IntraVENous CONTINUOUS    sodium chloride (NS) flush 5-10 mL  5-10 mL IntraVENous Q8H    sodium chloride (NS) flush 5-10 mL  5-10 mL IntraVENous PRN    acetaminophen (TYLENOL) tablet 650 mg  650 mg Oral Q4H PRN    ondansetron (ZOFRAN) injection 4 mg  4 mg IntraVENous Q4H PRN    enoxaparin (LOVENOX) injection 40 mg  40 mg SubCUTAneous Q12H    cefTRIAXone (ROCEPHIN) 1 g in 0.9% sodium chloride (MBP/ADV) 50 mL  1 g IntraVENous Q24H    methylPREDNISolone (PF) (SOLU-MEDROL) injection 20 mg  20 mg IntraVENous Q6H    albuterol-ipratropium (DUO-NEB) 2.5 MG-0.5 MG/3 ML  3 mL Nebulization Q6H RT    albuterol (PROVENTIL VENTOLIN) nebulizer solution 2.5 mg  2.5 mg Nebulization Q2H PRN    azithromycin (ZITHROMAX) 500 mg in 0.9% sodium chloride (MBP/ADV) 250 mL  500 mg IntraVENous Q24H    nicotine (NICODERM CQ) 21 mg/24 hr patch 1 Patch  1 Patch TransDERmal DAILY    cholecalciferol (VITAMIN D3) tablet 2,000 Units  2,000 Units Oral Once per day on Mon Tue Wed Thu Fri    [START ON 1/18/2018] cholecalciferol (VITAMIN D3) tablet 4,000 Units  4,000 Units Oral Once per day on Thu Sat    dextroamphetamine-amphetamine (ADDERALL) tablet 15 mg  15 mg Oral BID    dilTIAZem CD (CARDIZEM CD) capsule 240 mg  240 mg Oral DAILY    levothyroxine (SYNTHROID) tablet 112 mcg  112 mcg Oral ACB    LORazepam (ATIVAN) tablet 1 mg  1 mg Oral BID PRN    QUEtiapine (SEROquel) tablet 50 mg  50 mg Oral QHS    simvastatin (ZOCOR) tablet 10 mg  10 mg Oral QHS    venlafaxine-SR (EFFEXOR-XR) capsule 225 mg  225 mg Oral DAILY        Lab Data Reviewed: (see below)  Lab Review:     Recent Labs      01/17/18   0032  01/16/18 1927   WBC  4.7  7.6   HGB  13.2  13.8   HCT  40.5  42.0   PLT  189  184     Recent Labs      01/17/18   0032  01/16/18 1927   NA  140  136   K  3.4*  3.2*   CL  103  99   CO2  24  28   GLU  226*  177*   BUN  10  8   CREA  1.18*  1.33*   CA  8.9  8.8     Lab Results   Component Value Date/Time    Glucose (POC) 174 01/16/2018 07:30 PM    Glucose (POC) 107 08/18/2012 07:11 PM     No results for input(s): PH, PCO2, PO2, HCO3, FIO2 in the last 72 hours. No results for input(s): INR in the last 72 hours.     No lab exists for component: INREXT  All Micro Results     Procedure Component Value Units Date/Time    CULTURE, BLOOD, PAIRED [[id number]] Collected:  01/16/18 1927    Order Status:  Completed Specimen:  Blood Updated:  01/17/18 0822     Special Requests: NO SPECIAL REQUESTS        Culture result: NO GROWTH AFTER 9 HOURS       RESPIRATORY PANEL,PCR,NASOPHARYNGEAL [[id number]]     Order Status:  Sent Specimen:  NASOPHARYNGEAL David Bucio AB, IGG/IGM [[id number]] Collected:  01/16/18 2149    Order Status:  Completed Specimen:  Serum Updated:  01/16/18 2226    LEGIONELLA PNEUMOPHILA AG, URINE [[id number]]     Order Status:  Sent Specimen:  Urine from Urine     CULTURE, BLOOD [[id number]]     Order Status:  Sent Specimen:  Blood from Blood     INFLUENZA A & B AG (RAPID TEST) [[id number]] Collected:  01/16/18 1927    Order Status:  Completed Specimen:  Nasopharyngeal from Nasal washing Updated:  01/16/18 2008     Influenza A Antigen NEGATIVE         Influenza B Antigen NEGATIVE              I have reviewed notes of prior 24hr.     Other pertinent lab: none    Total time spent with patient: [address] [facility] discussed with: Patient, Care Manager, Nursing Staff, Consultant/Specialist and >50% of time spent in counseling and coordination of care    Discussed:  Care Plan and D/C Planning    Prophylaxis:  H2B/PPI    Disposition:  Home w/Family           ___________________________________________________    Attending Physician: Marisa Monteiro MD

## 2018-01-17 NOTE — PROGRESS NOTES
[facility]: MED RECONCILIATION    Information obtained from: Patient    Allergies: Egg    Comment:  Adderall : Patient takes fist dose in about an hour of awakening and then second dose about 3-4 hours later. Prior to Admission Medications:     Medication Documentation Review Audit       Reviewed by Chris Myers, PHARMD (Pharmacist) on 01/16/18 at 2040         Medication Sig Documenting Provider Last Dose Status Taking? cholecalciferol, vitamin D3, (VITAMIN D3) 2,000 unit tab Take 2,000 Units by mouth five (5) days a week. Monday, Tuesday, Wednesday, Thursday, Friday Historical Provider 1/16/2018 AM Active Yes    cholecalciferol, vitamin D3, (VITAMIN D3) 2,000 unit tab Take 4,000 Units by mouth two (2) days a week. Saturday and Sunday Historical Provider 1/14/2018 Active Yes    dextroamphetamine-amphetamine (ADDERALL) 15 mg tablet Take 1 Tab (15 mg total) by mouth two (2) times a day. Max Daily Amount: 30 mg Slava Benitez MD 1/16/2018 Afternoon Active Yes    dilTIAZem CD (CARDIZEM CD) 240 mg ER capsule Take 1 Cap by mouth daily for 90 days. Indications: hypertension Elsie Diaz MD 1/16/2018 AM Active Yes    levothyroxine (SYNTHROID) 112 mcg tablet Take 112 mcg by mouth Daily (before breakfast). Historical Provider 1/16/2018 AM Active Yes    LORazepam (ATIVAN) 1 mg tablet Take 1 mg by mouth two (2) times daily as needed for Anxiety. Historical Provider  Active Yes    QUEtiapine (SEROQUEL) 25 mg tablet Take 50 mg by mouth nightly. Historical Provider 1/15/2018 Active Yes    simvastatin (ZOCOR) 10 mg tablet TAKE 1 TABLET BY MOUTH EVERY NIGHT Elsie Diaz MD 1/15/2018 Active Yes    venlafaxine-SR (EFFEXOR XR) 75 mg capsule Take 225 mg by mouth daily.  Historical Provider 1/16/2018 Active Yes                        Chris Myers, PHARMD   Contact: [phone]

## 2018-01-17 NOTE — ROUTINE PROCESS
Primary Nurse Iveth Mello RN and Dave Bartholomew RN performed a dual skin assessment on this patient No impairment noted  Kiko score is 21    Red buttocks  BLE discolored

## 2018-01-17 NOTE — PROGRESS NOTES
Problem: Falls - Risk of  Goal: *Absence of Falls  Document Moises Fall Risk and appropriate interventions in the flowsheet.    Outcome: Progressing Towards Goal  Fall Risk Interventions:  Mobility Interventions: PT Consult for mobility concerns         Medication Interventions: Bed/chair exit alarm, Patient to call before getting OOB    Elimination Interventions: Patient to call for help with toileting needs

## 2018-01-17 NOTE — ED NOTES
RN ambulates patient per order with pulse ox. Patient tolerates ambulation with moderate feeling of weakness in legs, pulse oximetry 92 and pulse of 96.

## 2018-01-17 NOTE — H&P
[address], [facility]  [phone]    Admission History and Physical      NAME:  Debi Brito   :   1960   MRN:  [id number]     PCP:  Roxana Weston MD     Date/Time:  2018         Subjective:     CHIEF COMPLAINT: fever     HISTORY OF PRESENT ILLNESS:     Ms. Иван Merritt is a 66 y.o. with h/o depression, anxiety, htn, hld who presents with fever. Pt notes feeling poorly since last Friday. She notes increased cough with some sputum production. She also notes fever since Friday. She is a smoker and continues to smoke. She does not want to stop smoking. Past Medical History:   Diagnosis Date    Anxiety     Depression 2010    HTN (hypertension)     Hyperlipidemia LDL goal < 130     Hypothyroid     Psychotic disorder     Severe depression and anxiety diagnosed at 33 yo    Tobacco abuse         Past Surgical History:   Procedure Laterality Date    HX APPENDECTOMY      HX DILATION AND CURETTAGE         Social History   Substance Use Topics    Smoking status: Current Every Day Smoker     Packs/day: 1.00    Smokeless tobacco: Never Used      Comment:  1 ppd/40+ years    Alcohol use No        No family history on file. Allergies   Allergen Reactions    Egg Other (comments)     Abdominal pain        Prior to Admission medications    Medication Sig Start Date End Date Taking? Authorizing Provider   cholecalciferol, vitamin D3, (VITAMIN D3) 2,000 unit tab Take 4,000 Units by mouth two (2) days a week. Saturday and    Yes Historical Provider   venlafaxine-SR (EFFEXOR XR) 75 mg capsule Take 225 mg by mouth daily. Yes Historical Provider   QUEtiapine (SEROQUEL) 25 mg tablet Take 50 mg by mouth nightly. Yes Historical Provider   dilTIAZem CD (CARDIZEM CD) 240 mg ER capsule Take 1 Cap by mouth daily for 90 days.  Indications: hypertension 12/19/17 3/19/18 Yes Roxana Weston MD   simvastatin ([place]) 10 mg tablet TAKE 1 TABLET BY MOUTH EVERY NIGHT 12/15/17  Yes Mitchell Babin MD   dextroamphetamine-amphetamine (ADDERALL) 15 mg tablet Take 1 Tab (15 mg total) by mouth two (2) times a day. Max Daily Amount: 30 mg 10/6/17  Yes Letty Hedrick MD   LORazepam (ATIVAN) 1 mg tablet Take 1 mg by mouth two (2) times daily as needed for Anxiety. Yes Historical Provider   cholecalciferol, vitamin D3, (VITAMIN D3) 2,000 unit tab Take 2,000 Units by mouth five (5) days a week. Monday, Tuesday, Wednesday, Thursday, Friday   Yes Historical Provider   levothyroxine (SYNTHROID) 112 mcg tablet Take 112 mcg by mouth Daily (before breakfast).    Yes Historical Provider         Review of Systems:    Gen:  fever, chills, fatigue  Eyes:  ENT:  CVS:  Pulm:  CoughGI:    :    MS:  Skin:  Psych:  Endo:    Hem:  Renal:    Neuro:            Objective:      VITALS:    Vital signs reviewed; most recent are:    Visit Vitals    /86 (BP 1 Location: Right arm, BP Patient Position: Sitting)    Pulse 91    Temp (!) 101.2 °F (38.4 °C)    Resp 20    Ht 5' 2\" (1.575 m)    Wt 114.8 kg (253 lb 1.4 oz)    SpO2 95%    BMI 46.29 kg/m2     SpO2 Readings from Last 6 Encounters:   01/16/18 95%   10/13/17 95%   10/13/17 95%   10/12/17 96%   10/10/17 93%   09/28/17 93%        No intake or output data in the 24 hours ending 01/16/18 5878         Exam:     Physical Exam:    Gen:  Well-developed, well-nourished, in no acute distress  HEENT:  Pink conjunctivae, PERRL, hearing intact to voice, moist mucous membranes  Neck:  Supple, without masses, thyroid non-tender  Resp:  No accessory muscle use, moderate wheezing bilateral  Card:  No murmurs, normal S1, S2 without thrills, bruits or peripheral edema  Abd:  Soft, non-tender, non-distended, normoactive bowel sounds are present, no palpable organomegaly  Lymph:  No cervical adenopathy  Musc:  No cyanosis or clubbing  Skin:  No rashes or ulcers, skin turgor is good  Neuro:  Cranial nerves 3-12 are grossly intact,  strength is 5/5 bilaterally, dorsi / plantarflexion strength is 5/5 bilaterally, follows commands appropriately  Psych:  Alert with good insight. Oriented to person, place, and time       Labs:    Recent Labs      01/16/18 1927   WBC  7.6   HGB  13.8   HCT  42.0   PLT  184     Recent Labs      01/16/18 1927   NA  136   K  3.2*   CL  99   CO2  28   GLU  177*   BUN  8   CREA  1.33*   CA  8.8     No components found for: GLPOC  No results for input(s): PH, PCO2, PO2, HCO3, FIO2 in the last 72 hours. No results for input(s): INR in the last 72 hours. No lab exists for component: INREXT    Chest Xray:  RLL infiltrate       Assessment/Plan:       Principal Problem:    Pneumonia / Reactive airway disease: continue with CTX and azithromycin. Blood cultures sent. Wheezing noted on exam, start solumedrol with duonebs. Active Problems:    Depression: severe. Resume effexor, adderall      Hypercholesteremia: resume zocor      Tobacco abuse: counseled on cessation for ~4 minutes. Nicotine patch ordered      Hypothyroid: resume synthroid. JING (acute kidney injury) : mild, hydrate and follow      HTN (hypertension): resume dilt     SIRs / lactic acidosis: NOT severe sepsis. Only has 1/4 SIRs criteria. Lactic acid mildly elevated.  Sp 1L bolus, cont IVF and lizabeth    Hypokalemia: replete prn    Obesity: advise weight loss      Surrogate decision maker:   Total time spent with patient: [address] discussed with: Patient    Discussed:  Care Plan    Prophylaxis:  Lovenox    Probable Disposition:  Home w/Family           ___________________________________________________    Attending Physician: Deon Hussein MD

## 2018-01-18 VITALS
HEIGHT: 62 IN | SYSTOLIC BLOOD PRESSURE: 126 MMHG | RESPIRATION RATE: 17 BRPM | TEMPERATURE: 98.2 F | OXYGEN SATURATION: 93 % | HEART RATE: 84 BPM | WEIGHT: 253.09 LBS | BODY MASS INDEX: 46.57 KG/M2 | DIASTOLIC BLOOD PRESSURE: 79 MMHG

## 2018-01-18 LAB
ANION GAP SERPL CALC-SCNC: 8 MMOL/L (ref 5–15)
BUN SERPL-MCNC: 13 MG/DL (ref 6–20)
BUN/CREAT SERPL: 15 (ref 12–20)
CALCIUM SERPL-MCNC: 8.5 MG/DL (ref 8.5–10.1)
CHLORIDE SERPL-SCNC: 109 MMOL/L (ref 97–108)
CO2 SERPL-SCNC: 27 MMOL/L (ref 21–32)
CREAT SERPL-MCNC: 0.89 MG/DL (ref 0.55–1.02)
ERYTHROCYTE [DISTWIDTH] IN BLOOD BY AUTOMATED COUNT: 13.3 % (ref 11.5–14.5)
GLUCOSE SERPL-MCNC: 153 MG/DL (ref 65–100)
HCT VFR BLD AUTO: 37.2 % (ref 35–47)
HGB BLD-MCNC: 12.5 G/DL (ref 11.5–16)
M PNEUMO IGG SER IA-ACNC: 383 U/ML (ref 0–99)
M PNEUMO IGM SER IA-ACNC: <770 U/ML (ref 0–769)
MAGNESIUM SERPL-MCNC: 2 MG/DL (ref 1.6–2.4)
MCH RBC QN AUTO: 31.2 PG (ref 26–34)
MCHC RBC AUTO-ENTMCNC: 33.6 G/DL (ref 30–36.5)
MCV RBC AUTO: 92.8 FL (ref 80–99)
PLATELET # BLD AUTO: 201 K/UL (ref 150–400)
POTASSIUM SERPL-SCNC: 4.3 MMOL/L (ref 3.5–5.1)
RBC # BLD AUTO: 4.01 M/UL (ref 3.8–5.2)
SODIUM SERPL-SCNC: 144 MMOL/L (ref 136–145)
WBC # BLD AUTO: 8.9 K/UL (ref 3.6–11)

## 2018-01-18 PROCEDURE — 94640 AIRWAY INHALATION TREATMENT: CPT

## 2018-01-18 PROCEDURE — 85027 COMPLETE CBC AUTOMATED: CPT | Performed by: INTERNAL MEDICINE

## 2018-01-18 PROCEDURE — 80048 BASIC METABOLIC PNL TOTAL CA: CPT | Performed by: INTERNAL MEDICINE

## 2018-01-18 PROCEDURE — 77030038269 HC DRN EXT URIN PURWCK BARD -A

## 2018-01-18 PROCEDURE — 36415 COLL VENOUS BLD VENIPUNCTURE: CPT | Performed by: INTERNAL MEDICINE

## 2018-01-18 PROCEDURE — 74011250636 HC RX REV CODE- 250/636: Performed by: INTERNAL MEDICINE

## 2018-01-18 PROCEDURE — 83735 ASSAY OF MAGNESIUM: CPT | Performed by: INTERNAL MEDICINE

## 2018-01-18 PROCEDURE — 74011000250 HC RX REV CODE- 250: Performed by: INTERNAL MEDICINE

## 2018-01-18 PROCEDURE — 97161 PT EVAL LOW COMPLEX 20 MIN: CPT

## 2018-01-18 PROCEDURE — 97116 GAIT TRAINING THERAPY: CPT

## 2018-01-18 PROCEDURE — 74011250637 HC RX REV CODE- 250/637: Performed by: INTERNAL MEDICINE

## 2018-01-18 RX ORDER — ALBUTEROL SULFATE 0.83 MG/ML
2.5 SOLUTION RESPIRATORY (INHALATION)
Status: DISCONTINUED | OUTPATIENT
Start: 2018-01-18 | End: 2018-01-18 | Stop reason: HOSPADM

## 2018-01-18 RX ADMIN — DILTIAZEM HYDROCHLORIDE 240 MG: 240 CAPSULE, COATED, EXTENDED RELEASE ORAL at 08:11

## 2018-01-18 RX ADMIN — DEXTROAMPHETAMINE SACCHARATE, AMPHETAMINE ASPARTATE, DEXTROAMPHETAMINE SULFATE AND AMPHETAMINE SULFATE 15 MG: 1.25; 1.25; 1.25; 1.25 TABLET ORAL at 08:11

## 2018-01-18 RX ADMIN — LEVOTHYROXINE SODIUM 112 MCG: 112 TABLET ORAL at 06:18

## 2018-01-18 RX ADMIN — DEXTROSE MONOHYDRATE, SODIUM CHLORIDE, AND POTASSIUM CHLORIDE: 50; 4.5; 2.98 INJECTION, SOLUTION INTRAVENOUS at 02:30

## 2018-01-18 RX ADMIN — IPRATROPIUM BROMIDE AND ALBUTEROL SULFATE 3 ML: .5; 3 SOLUTION RESPIRATORY (INHALATION) at 01:47

## 2018-01-18 RX ADMIN — Medication 10 ML: at 06:18

## 2018-01-18 RX ADMIN — VENLAFAXINE HYDROCHLORIDE 225 MG: 150 CAPSULE, EXTENDED RELEASE ORAL at 08:16

## 2018-01-18 RX ADMIN — LORAZEPAM 1 MG: 1 TABLET ORAL at 08:11

## 2018-01-18 RX ADMIN — ENOXAPARIN SODIUM 40 MG: 40 INJECTION SUBCUTANEOUS at 08:12

## 2018-01-18 NOTE — DISCHARGE INSTRUCTIONS
Patient Discharge Instructions    Monico Drake / [id number] : 1960    Admitted 2018 Discharged: 2018     Primary Diagnoses  Problem List as of 2018  Date Reviewed: 2018          Codes Class Noted - Resolved   Lactic acid acidosis   Fever   Sinus tachycardia   * (Principal)Pneumonia   Obesity   Sepsis ([facility] [address]   JING (acute kidney injury) ([facility] [address]   HTN (hypertension)   Anxiety   Tobacco abuse   Hypothyroid   Depression   Hypercholesteremia          Take Home Medications     · It is important that you take the medication exactly as they are prescribed. · Keep your medication in the bottles provided by the pharmacist and keep a list of the medication names, dosages, and times to be taken in your wallet. · Do not take other medications without consulting your doctor. What to do at Home    Recommended diet: Cardiac Diet    Recommended activity: Activity as tolerated, avoid infants for 1 week    If you experience worse symptoms, please follow up with your PCP. Follow-up with your PCP in a few weeks        Information obtained by :  I understand that if any problems occur once I am at home I am to contact my physician. I understand and acknowledge receipt of the instructions indicated above. [de-identified] or R.N.'s Signature                                                                  Date/Time                                                                                                                                              Patient or Representative Signature                                                          Date/Time     Learning About RSV Infection  What is RSV? RSV is short for respiratory syncytial virus infection. It causes the same symptoms as a bad cold. And like a cold, it is very common and spreads easily.  Most children have had it at least once by age 3. There are many kinds of RSV, so your child's body never becomes immune to it. Your child can get it again and again throughout his or her life, sometimes during the same season. What happens when your child has RSV? RSV attacks your child's nose, eyes, throat, and lungs. It spreads when your child coughs, sneezes, or shares food or drinks. RSV can make it hard for a child to breathe. It is important to watch the symptoms, especially in babies. What are the symptoms? Symptoms of RSV include:  · A cough. · A stuffy or runny nose. · A mild sore throat. · An earache. · A fever. Babies with RSV may also have no energy, act fussy or cranky, and be less hungry than usual. Some children have more serious symptoms, like wheezing or trouble breathing. Call your doctor if your child is wheezing or having trouble breathing. How can you prevent RSV infection? It is very hard to keep from catching RSV, just like it is hard to keep from catching a cold. But you can lower the chances by practicing good health habits. Wash your hands often, and teach your child to do the same. See that your child gets all the vaccines your doctor recommends. How is RSV treated? Home treatment is usually all that is needed:  · Raise the head of your child's bed or crib. · Suction your baby's nose if he or she can't breathe well enough to eat or sleep. · Control fever with acetaminophen or ibuprofen. Be safe with medicines. Read and follow all instructions on the label. Do not give aspirin to anyone younger than 20. It has been linked to Reye syndrome, a serious illness. · Give your child lots of fluids, enough so that the urine is light yellow or clear like water. This is very important if your child is vomiting or has diarrhea. Give your child sips of water or drinks such as Pedialyte or Infalyte. These drinks contain a mix of salt, sugar, and minerals.  You can buy them at drugstores or grocery stores. Give these drinks as long as your child is throwing up or has diarrhea. Do not use them as the only source of liquids or food for more than 12 to 24 hours. When a child with RSV is otherwise healthy, symptoms usually get better in a week or two. Follow-up care is a key part of your child's treatment and safety. Be sure to make and go to all appointments, and call your doctor if your child is having problems. It's also a good idea to know your child's test results and keep a list of the medicines your child takes. Where can you learn more? Go to http://lavelle-rhys.info/. Enter T072 in the search box to learn more about \"Learning About RSV Infection in Children. \"  Current as of: May 12, 2017  Content Version: 11.4  © 4540-9445 [employer]. Care instructions adapted under license by [employer] (which disclaims liability or warranty for this information). If you have questions about a medical condition or this instruction, always ask your healthcare professional. [place] [address] any warranty or liability for your use of this information. Stopping Smoking: Care Instructions  Your Care Instructions    Cigarette smokers crave the nicotine in cigarettes. Giving it up is much harder than simply changing a habit. Your body has to stop craving the nicotine. It is hard to quit, but you can do it. There are many tools that people use to quit smoking. You may find that combining tools works best for you. There are several steps to quitting. First you get ready to quit. Then you get support to help you. After that, you learn new skills and behaviors to become a nonsmoker. For many people, a necessary step is getting and using medicine. Your doctor will help you set up the plan that best meets your needs. You may want to attend a smoking cessation program to help you quit smoking.  When you choose a program, look for one that has proven success. Ask your doctor for ideas. You will greatly increase your chances of success if you take medicine as well as get counseling or join a cessation program.  Some of the changes you feel when you first quit tobacco are uncomfortable. Your body will miss the nicotine at first, and you may feel short-tempered and grumpy. You may have trouble sleeping or concentrating. Medicine can help you deal with these symptoms. You may struggle with changing your smoking habits and rituals. The last step is the tricky one: Be prepared for the smoking urge to continue for a time. This is a lot to deal with, but keep at it. You will feel better. Follow-up care is a key part of your treatment and safety. Be sure to make and go to all appointments, and call your doctor if you are having problems. It's also a good idea to know your test results and keep a list of the medicines you take. How can you care for yourself at home? · Ask your family, friends, and coworkers for support. You have a better chance of quitting if you have help and support. · Join a support group, such as Nicotine Anonymous, for people who are trying to quit smoking. · Consider signing up for a smoking cessation program, such as the American Lung Association's Freedom from Smoking program.  · Set a quit date. Pick your date carefully so that it is not right in the middle of a big deadline or stressful time. Once you quit, do not even take a puff. Get rid of all ashtrays and lighters after your last cigarette. Clean your house and your clothes so that they do not smell of smoke. · Learn how to be a nonsmoker. Think about ways you can avoid those things that make you reach for a cigarette. ¨ Avoid situations that put you at greatest risk for smoking. For some people, it is hard to have a drink with friends without smoking. For others, they might skip a coffee break with coworkers who smoke. ¨ Change your daily routine.  Take a different route to work or eat a meal in a different place. · Cut down on stress. Calm yourself or release tension by doing an activity you enjoy, such as reading a book, taking a hot bath, or gardening. · Talk to your doctor or pharmacist about nicotine replacement therapy, which replaces the nicotine in your body. You still get nicotine but you do not use tobacco. Nicotine replacement products help you slowly reduce the amount of nicotine you need. These products come in several forms, many of them available over-the-counter:  ¨ Nicotine patches  ¨ Nicotine gum and lozenges  ¨ Nicotine inhaler  · Ask your doctor about bupropion (Wellbutrin) or varenicline (Chantix), which are prescription medicines. They do not contain nicotine. They help you by reducing withdrawal symptoms, such as stress and anxiety. · Some people find hypnosis, acupuncture, and massage helpful for ending the smoking habit. · Eat a healthy diet and get regular exercise. Having healthy habits will help your body move past its craving for nicotine. · Be prepared to keep trying. Most people are not successful the first few times they try to quit. Do not get mad at yourself if you smoke again. Make a list of things you learned and think about when you want to try again, such as next week, next month, or next year. Where can you learn more? Go to http://lavelle-rhys.info/. Enter Q630 in the search box to learn more about \"Stopping Smoking: Care Instructions. \"  Current as of: March 20, 2017  Content Version: 11.4  © 4150-9575 [employer] Care instructions adapted under license by [employer] (which disclaims liability or warranty for this information). If you have questions about a medical condition or this instruction, always ask your healthcare professional. [place] [address] any warranty or liability for your use of this information.

## 2018-01-18 NOTE — PROGRESS NOTES
Carlos Ryan [facility] [address]  [address] [facility], [address] [place] [address]  [phone]      Medical Progress Note      NAME: Chika Carpenter   :  1960  MRM:  [id number]    Date/Time: 2018  10:11 AM       Assessment and Plan:     RSV Pneumonia - POA. She has cough, fever and slightly abnormal xray. No WBC. Negative blood cx and urine serologies, positive RSV on viral panel. Stop empiric ceftriaxone and azithromycin. Stop steroids and nebs. Discussed RSV precautions regarding infants. Check 6 minute walk prior to discharge. Sepsis / Fever / Sinus tachycardia - POA, due to PNA vs other infection. SEVERE sepsis on admission based on ARF and lactic acid. Sepsis protocol initiated in ER. Now both ARF and lactic acid improved. Now good cap refill and pulses. Can now stop IVF and follow cx. JING (acute kidney injury) / Lactic acid acidosis / Dehydration / hypokalemia - POA, related to illness. Improved with IVF. Low K driven by nebs. Follow. HTN (hypertension) - continue diltiazem for BP and rate control    Hypothyroid - Continue synthroid. Morbid Obesity / ELMER on CPAP - Advise weight loss. Continue home CPAP, which is at bedside. Depression / Anxiety - Continue adderall, ativan, quetiapine, seroquel and venlafaxine    Hypercholesteremia - continue simvastatin    Tobacco abuse - Advised cessation. Spent 5 minutes in addition to all other time spent on admission, noted below. Will provide patch. Subjective:     Chief Complaint:  Breathing is improved. Fever gone    ROS:  (bold if positive, if negative)    CoughSOB/GORDILLO  Tolerating PT  Tolerating Diet        Objective:     Last 24hrs VS reviewed since prior progress note.  Most recent are:    Visit Vitals    /83 (BP 1 Location: Right arm, BP Patient Position: At rest)    Pulse 83    Temp 98 °F (36.7 °C)    Resp 20    Ht 5' 2\" (1.575 m)    Wt 114.8 kg (253 lb 1.4 oz)    SpO2 93%    BMI 46.29 kg/m2 SpO2 Readings from Last 6 Encounters:   01/18/18 93%   10/13/17 95%   10/13/17 95%   10/12/17 96%   10/10/17 93%   09/28/17 93%    O2 Flow Rate (L/min): 2 l/min   No intake or output data in the 24 hours ending 01/18/18 1039     Physical Exam:    Gen:  Morbid obese, in no acute distress  HEENT:  Pink conjunctivae, PERRL, hearing intact to voice, moist mucous membranes  Neck:  Supple, without masses, thyroid non-tender  Resp:  No accessory muscle use, bilateral breath sounds with wheezes and rhonchi  Card:  No murmurs, normal S1, S2 without thrills, bruits or peripheral edema  Abd:  Soft, non-tender, non-distended, normoactive bowel sounds are present, no mass  Lymph:  No cervical or inguinal adenopathy  Musc:  No cyanosis or clubbing  Skin:  No rashes or ulcers, skin turgor is good  Neuro:  Cranial nerves are grossly intact, mild motor weakness, follows commands appropriately  Psych:  Good insight, oriented to person, place and time, alert    Telemetry reviewed:   normal sinus rhythm  __________________________________________________________________  Medications Reviewed: (see below)  Medications:     Current Facility-Administered Medications   Medication Dose Route Frequency    albuterol (PROVENTIL VENTOLIN) nebulizer solution 2.5 mg  2.5 mg Nebulization Q6H PRN    sodium chloride (NS) flush 5-10 mL  5-10 mL IntraVENous Q8H    sodium chloride (NS) flush 5-10 mL  5-10 mL IntraVENous PRN    acetaminophen (TYLENOL) tablet 650 mg  650 mg Oral Q4H PRN    ondansetron (ZOFRAN) injection 4 mg  4 mg IntraVENous Q4H PRN    enoxaparin (LOVENOX) injection 40 mg  40 mg SubCUTAneous Q12H    nicotine (NICODERM CQ) 21 mg/24 hr patch 1 Patch  1 Patch TransDERmal DAILY    dextroamphetamine-amphetamine (ADDERALL) tablet 15 mg  15 mg Oral BID    dilTIAZem CD (CARDIZEM CD) capsule 240 mg  240 mg Oral DAILY    levothyroxine (SYNTHROID) tablet 112 mcg  112 mcg Oral ACB    LORazepam (ATIVAN) tablet 1 mg  1 mg Oral BID PRN  QUEtiapine (SEROquel) tablet 50 mg  50 mg Oral QHS    simvastatin (ZOCOR) tablet 10 mg  10 mg Oral QHS    venlafaxine-SR (EFFEXOR-XR) capsule 225 mg  225 mg Oral DAILY        Lab Data Reviewed: (see below)  Lab Review:     Recent Labs      01/18/18 0449 01/17/18   0032  01/16/18 1927   WBC  8.9  4.7  7.6   HGB  12.5  13.2  13.8   HCT  37.2  40.5  42.0   PLT  201  189  184     Recent Labs      01/18/18 0449 01/17/18   0032  01/16/18 1927   NA  144  140  136   K  4.3  3.4*  3.2*   CL  109*  103  99   CO2  27  24  28   GLU  153*  226*  177*   BUN  13  10  8   CREA  0.89  1.18*  1.33*   CA  8.5  8.9  8.8   MG  2.0   --    --      Lab Results   Component Value Date/Time    Glucose (POC) 174 01/16/2018 07:30 PM    Glucose (POC) 107 08/18/2012 07:11 PM     No results for input(s): PH, PCO2, PO2, HCO3, FIO2 in the last 72 hours. No results for input(s): INR in the last 72 hours.     No lab exists for component: Jon   All Micro Results     Procedure Component Value Units Date/Time    CULTURE, BLOOD, PAIRED [[id number]] Collected:  01/16/18 1927    Order Status:  Completed Specimen:  Blood Updated:  01/18/18 0713     Special Requests: NO SPECIAL REQUESTS        Culture result: NO GROWTH 2 DAYS       RESPIRATORY PANEL,PCR,NASOPHARYNGEAL [[id number]]  (Abnormal) Collected:  01/17/18 1419    Order Status:  Completed Specimen:  Nasopharyngeal Updated:  01/17/18 1904     Adenovirus NOT DETECTED        Coronavirus 229E NOT DETECTED        Coronavirus HKU1 NOT DETECTED        Coronavirus CVNL63 NOT DETECTED        Coronavirus OC43 NOT DETECTED        Metapneumovirus NOT DETECTED        Rhinovirus and Enterovirus NOT DETECTED        Influenza A NOT DETECTED        Influenza A, subtype H1 NOT DETECTED        Influenza A, subtype H3 NOT DETECTED        INFLUENZA A H1N1 PCR NOT DETECTED        Influenza B NOT DETECTED        Parainfluenza 1 NOT DETECTED        Parainfluenza 2 NOT DETECTED        Parainfluenza 3 NOT DETECTED        Parainfluenza virus 4 NOT DETECTED        RSV by PCR DETECTED (A)         CALLED TO AND READ BACK BY  CHRISSY RESENDIZ RN [facility] AT 1902 ON [id number]. Dang 1850          Bordetella pertussis - PCR NOT DETECTED        Chlamydophila pneumoniae DNA, QL, PCR NOT DETECTED        Mycoplasma pneumoniae DNA, QL, PCR NOT DETECTED       Stephany Evansz, UR/CSF [[id number]] Collected:  01/17/18 1419    Order Status:  Completed Specimen:  Other Updated:  01/17/18 1450    LEGIONELLA PNEUMOPHILA AG, URINE [[id number]] Collected:  01/17/18 1419    Order Status:  Completed Specimen:  Urine from Urine Updated:  01/17/18 1448    MYCOPLASMA AB, IGG/IGM [[id number]] Collected:  01/16/18 2149    Order Status:  Completed Specimen:  Serum Updated:  01/16/18 2226    CULTURE, BLOOD [[id number]]     Order Status:  Canceled Specimen:  Blood from Blood     INFLUENZA A & B AG (RAPID TEST) [[id number]] Collected:  01/16/18 1927    Order Status:  Completed Specimen:  Nasopharyngeal from Nasal washing Updated:  01/16/18 2008     Influenza A Antigen NEGATIVE         Influenza B Antigen NEGATIVE              I have reviewed notes of prior 24hr.     Other pertinent lab: none    Total time spent with patient: [address] [place] [address] discussed with: Patient, Care Manager, Nursing Staff, Consultant/Specialist and >50% of time spent in counseling and coordination of care    Discussed:  Care Plan and D/C Planning    Prophylaxis:  H2B/PPI    Disposition:  Home w/Family           ___________________________________________________    Attending Physician: Con Rosas MD

## 2018-01-18 NOTE — DISCHARGE SUMMARY
Physician Discharge Summary     Patient ID:  Charlene Boothe  [id number]  89 y.o.  1960    Admit date: 1/16/2018    Discharge date and time: 1/18/2018    Admission Diagnoses: Pneumonia    Discharge Diagnoses:    Principal Diagnosis   Pneumonia                                             Other Diagnoses  Principal Problem:    Pneumonia (1/16/2018)    Active Problems:    Depression (8/19/2010)    Hypercholesteremia (8/19/2010)    Anxiety ()    Tobacco abuse ()    Hypothyroid ()    Sepsis ([facility] [address] (9/30/2017)    JING (acute kidney injury) ([facility] (9/30/2017)    HTN (hypertension) (9/30/2017)    Obesity (1/16/2018)    Lactic acid acidosis (1/17/2018)    Fever (1/17/2018)    Sinus tachycardia (1/17/2018)    Hospital Course:   RSV Pneumonia - POA. She has cough, fever and slightly abnormal xray. No WBC. Negative blood cx and urine serologies, positive RSV on viral panel. Stop empiric ceftriaxone and azithromycin. Stop steroids and nebs. Discussed RSV precautions regarding infants. Check 6 minute walk prior to discharge.     Sepsis / Fever / Sinus tachycardia - POA, due to PNA vs other infection. SEVERE sepsis on admission based on ARF and lactic acid. Sepsis protocol initiated in ER. Now both ARF and lactic acid improved. Now good cap refill and pulses. Can now stop IVF and follow cx.      JING (acute kidney injury) / Lactic acid acidosis / Dehydration / hypokalemia - POA, related to illness. Improved with IVF. Low K driven by nebs. Follow.      HTN (hypertension) - continue diltiazem for BP and rate control     Hypothyroid - Continue synthroid.     Morbid Obesity / ELMER on CPAP - Advise weight loss. Continue home CPAP, which is at bedside.     Depression / Anxiety - Continue adderall, ativan, quetiapine, seroquel and venlafaxine     Hypercholesteremia - continue simvastatin     Tobacco abuse - Advised cessation. Spent 5 minutes in addition to all other time spent on admission, noted below.   Will provide patch.     PCP: Greyson Roche MD    Consults: None    Significant Diagnostic Studies: See Hospital Course    Discharged home in improved condition. Discharge Exam:   /83 (BP 1 Location: Right arm, BP Patient Position: At rest)    Pulse 83    Temp 98 °F (36.7 °C)    Resp 20    Ht 5' 2\" (1.575 m)    Wt 114.8 kg (253 lb 1.4 oz)    SpO2 93%    BMI 46.29 kg/m2      Gen: Morbid obese, in no acute distress  HEENT:  Pink conjunctivae, PERRL, hearing intact to voice, moist mucous membranes  Neck:  Supple, without masses, thyroid non-tender  Resp:  No accessory muscle use, bilateral breath sounds with wheezes and rhonchi  Card:  No murmurs, normal S1, S2 without thrills, bruits or peripheral edema  Abd:  Soft, non-tender, non-distended, normoactive bowel sounds are present, no mass  Lymph:  No cervical or inguinal adenopathy  Musc:  No cyanosis or clubbing  Skin:  No rashes or ulcers, skin turgor is good  Neuro:  Cranial nerves are grossly intact, mild motor weakness, follows commands appropriately  Psych:  Good insight, oriented to person, place and time, alert    Patient Instructions:   Current Discharge Medication List      CONTINUE these medications which have NOT CHANGED    Details   !! cholecalciferol, vitamin D3, (VITAMIN D3) 2,000 unit tab Take 4,000 Units by mouth two (2) days a week. Saturday and Sunday      venlafaxine-SR (EFFEXOR XR) 75 mg capsule Take 225 mg by mouth daily. QUEtiapine (SEROQUEL) 25 mg tablet Take 50 mg by mouth nightly. dilTIAZem CD (CARDIZEM CD) 240 mg ER capsule Take 1 Cap by mouth daily for 90 days. Indications: hypertension  Qty: 90 Cap, Refills: 0      simvastatin (ZOCOR) 10 mg tablet TAKE 1 TABLET BY MOUTH EVERY NIGHT  Qty: 90 Tab, Refills: 0    Associated Diagnoses: Hypercholesteremia      dextroamphetamine-amphetamine (ADDERALL) 15 mg tablet Take 1 Tab (15 mg total) by mouth two (2) times a day.   Max Daily Amount: 30 mg  Qty: 14 Tab, Refills: 0 LORazepam (ATIVAN) 1 mg tablet Take 1 mg by mouth two (2) times daily as needed for Anxiety. !! cholecalciferol, vitamin D3, (VITAMIN D3) 2,000 unit tab Take 2,000 Units by mouth five (5) days a week. Monday, Tuesday, Wednesday, Thursday, Friday      levothyroxine (SYNTHROID) 112 mcg tablet Take 112 mcg by mouth Daily (before breakfast). !! - Potential duplicate medications found. Please discuss with provider. Activity: Activity as tolerated  Diet: Cardiac Diet  Wound Care: None needed    Follow-up with your PCP in a few weeks.   Follow-up tests/labs - none    Signed:  Eladio Rogers MD  1/18/2018  10:44 AM

## 2018-01-18 NOTE — ROUTINE PROCESS
I called Dr. Cuco Day and notified her that the pt resp panel came back positive for RSV.   No orders received at this time

## 2018-01-18 NOTE — PROGRESS NOTES
2300: When obtaining VS, pt voiced concern about taking the albuterol neb treatments. Causing elevated BP, anxiety, flushness in the face.  Told pt to address with MD in the AM.

## 2018-01-18 NOTE — PROGRESS NOTES
Met with pt for d/c planning. Pt resides with her  in a two story house. She has had HH in the past but is unable to recall the provider. Home DME includes a cane, walker and crutches. Pt has Rx coverage and uses [facility] on [address]. Cm is awaiting results from 6mw to see if pt needs O2. She was offered [facility] visit but declined. Pt's  will provide transportation home. Nurse navigator, Jarrett Meade, was notified that pt is being discharged today. Hina Miller LCSW    Care Management Interventions  PCP Verified by CM:  (Dr. Del Valle Him)  Discharge Durable Medical Equipment: No  Physical Therapy Consult: Yes (For 6 mw)  Occupational Therapy Consult: No  Speech Therapy Consult: No  Current Support Network: Lives with Spouse  Confirm Follow Up Transport: Family  Plan discussed with Pt/Family/Caregiver: Yes  Discharge Location  Discharge Placement: Home      12:50 pm:  Confirmed with pt that her  will be transporting her home today around 2pm.  Pt's nurse notified.   Hina Miller LCSW

## 2018-01-18 NOTE — PROGRESS NOTES
Pt given all prescriptions and paperwork. I went over all of them with her. Iv removed and pt will be taken down by volunteer.

## 2018-01-18 NOTE — PROGRESS NOTES
physical Therapy EVALUATION/DISCHARGE  Patient: Shira Servin (84 y.o. female)  Date: 1/18/2018  Primary Diagnosis: Pneumonia             ASSESSMENT :  Based on the objective data described above, the patient presents with independent with ambulation without assistive device and independent with all functional mobility. Steady on her feet just coughing and wheezing during ambulation out on the [facility] hallway. Reviewed all safety precaution and home exercise program with the patient, verbalized understanding, clear to go home per Physical Therapy perspective. Skilled physical therapy is not indicated at this time. Documentation for home O2:     ROOM AIR    AT REST   O2 SATS  100% HR  70   ROOM AIR WITH ACTIVITY 02 SATS  97% HR  80   ROOM AIR PATIENT LEFT COMFORTABLY  SITTING/SUPINE O2 SATS  98% HR  80          PLAN :  Discharge Recommendations: None  Further Equipment Recommendations for Discharge: none     SUBJECTIVE:   Patient stated I'm still coughing.     OBJECTIVE DATA SUMMARY:   HISTORY:    Past Medical History:   Diagnosis Date    Anxiety and depression     HTN (hypertension)     Hyperlipidemia LDL goal < 130     Hypothyroid     Morbid obesity with BMI of 40.0-44.9, adult ([facility] [address]     Tobacco abuse      Past Surgical History:   Procedure Laterality Date    HX APPENDECTOMY      HX DILATION AND CURETTAGE       Prior Level of Function/Home Situation: Independent community ambulator without assistive device. Personal factors and/or comorbidities impacting plan of care:     Home Situation  Home Environment: Private residence  # Steps to Enter: 3  One/Two Story Residence: Two story, live on 1st floor  Living Alone: No  Support Systems: Family member(s)  Patient Expects to be Discharged to[de-identified] Private residence  Current DME Used/Available at Home: None    EXAMINATION/PRESENTATION/DECISION MAKING:   Critical Behavior:  Neurologic State: Alert           Hearing:   Auditory  Auditory Impairment: None    Range Of Motion:  AROM: Within functional limits           PROM: Within functional limits           Strength:    Strength: Within functional limits                    Tone & Sensation:                                  Coordination:  Coordination: Within functional limits  Vision:      Functional Mobility:  Bed Mobility:  Rolling: Independent  Supine to Sit: Independent  Sit to Supine: Independent  Scooting: Independent  Transfers:  Sit to Stand: Independent  Stand to Sit: Independent  Stand Pivot Transfers: Independent     Bed to Chair: Independent              Balance:   Sitting: Intact  Standing: Intact; Without support  Ambulation/Gait Training:  Distance (ft): 250 Feet (ft)     Ambulation - Level of Assistance: Independent     Gait Description (WDL): Within defined limits                                 Therapeutic Exercises:    Instructed patient to continue active range of motion exercise on both legs while up on chair or on bed. Functional Measure:  Tinetti test:    Sitting Balance: 1  Arises: 2  Attempts to Rise: 2  Immediate Standing Balance: 2  Standing Balance: 2  Nudged: 2  Eyes Closed: 1  Turn 360 Degrees - Continuous/Discontinuous: 1  Turn 360 Degrees - Steady/Unsteady: 1  Sitting Down: 2  Balance Score: 16  Indication of Gait: 1  R Step Length/Height: 1  L Step Length/Height: 1  R Foot Clearance: 1  L Foot Clearance: 1  Step Symmetry: 1  Step Continuity: 1  Path: 2  Trunk: 2  Walking Time: 1  Gait Score: 12  Total Score: 28       Tinetti Test and G-code impairment scale:  Percentage of Impairment CH    0%   CI    1-19% CJ    20-39% CK    40-59% CL    60-79% CM    80-99% CN     100%   Tinetti  Score 0-28 28 23-27 17-22 12-16 6-11 1-5 0       Tinetti Tool Score Risk of Falls  <19 = High Fall Risk  19-24 = Moderate Fall Risk  25-28 = Low Fall Risk  Tinetti ME. Performance-Oriented Assessment of Mobility Problems in Elderly Patients. Blank 66; [id number].  (Scoring Description: PT Bulletin Feb. 10, 1993)    Older adults: (Jason et al, 2009; n = 1000 [facility] elderly evaluated with ABC, LAVON, ADL, and IADL)  · Mean LAVON score for males aged 69-68 years = 26.21(3.40)  · Mean LVAON score for females age 69-68 years = 25.16(4.30)  · Mean LAVON score for males over 80 years = 23.29(6.02)  · Mean LAVON score for females over 80 years = 17.20(8.32)       G codes: In compliance with CMSs Claims Based Outcome Reporting, the following G-code set was chosen for this patient based on their primary functional limitation being treated: The outcome measure chosen to determine the severity of the functional limitation was the tinetti with a score of 28/28 which was correlated with the impairment scale. ? Mobility - Walking and Moving Around:     - CURRENT STATUS: CH - 0% impaired, limited or restricted    - GOAL STATUS: CH - 0% impaired, limited or restricted    - D/C STATUS:  CH - 0% impaired, limited or restricted        Physical Therapy Evaluation Charge Determination   History Examination Presentation Decision-Making   LOW Complexity : Zero comorbidities / personal factors that will impact the outcome / POC LOW Complexity : 1-2 Standardized tests and measures addressing body structure, function, activity limitation and / or participation in recreation  LOW Complexity : Stable, uncomplicated  Other outcome measures tinetti  LOW       Based on the above components, the patient evaluation is determined to be of the following complexity level: LOW     Pain:           Activity Tolerance:   Good. Please refer to the flowsheet for vital signs taken during this treatment.   After treatment:   [x]   Patient left in no apparent distress sitting up in chair  []   Patient left in no apparent distress in bed  [x]   Call bell left within reach  [x]   Nursing notified  []   Caregiver present  []   Bed alarm activated    COMMUNICATION/EDUCATION:   Communication/Collaboration:  [x]   Fall prevention education was provided and the patient/caregiver indicated understanding. [x]   Patient/family have participated as able and agree with findings and recommendations. []   Patient is unable to participate in plan of care at this time. Findings and recommendations were discussed with: Registered Nurse,  and patient    Thank you for this referral.  Gilbert Pruitt, PT,WCC.    Time Calculation: 25 mins

## 2018-01-19 LAB
FLUID CULTURE, SPNG2: NORMAL
L PNEUMO1 AG UR QL IA: NEGATIVE
ORGANISM ID, SPNG3: NORMAL
PLEASE NOTE, SPNG4: NORMAL
S PNEUM AG SPEC QL LA: NEGATIVE
S PNEUM AG SPEC QL LA: NEGATIVE
SPECIMEN SOURCE: NORMAL
SPECIMEN SOURCE: NORMAL
SPECIMEN, SPNG1: NORMAL

## 2018-01-21 LAB
BACTERIA SPEC CULT: NORMAL
SERVICE CMNT-IMP: NORMAL

## 2018-01-22 ENCOUNTER — TELEPHONE (OUTPATIENT)
Dept: INTERNAL MEDICINE CLINIC | Age: 58
End: 2018-01-22

## 2018-01-22 NOTE — TELEPHONE ENCOUNTER
Pt states she believes she has a sinus infection. Pt is requesting an antibiotic be sent to her pharmacy . Explained to pt that she may need to make an apt since we havent seen her in a little bit but patient is requesting the nurse gives her a call back instead. She is  being seen this Wed.  for a [facility] f.u        Pt can be reached at [phone]

## 2018-01-24 ENCOUNTER — OFFICE VISIT (OUTPATIENT)
Dept: INTERNAL MEDICINE CLINIC | Age: 58
End: 2018-01-24

## 2018-01-24 ENCOUNTER — HOSPITAL ENCOUNTER (OUTPATIENT)
Dept: GENERAL RADIOLOGY | Age: 58
Discharge: HOME | End: 2018-01-24
Attending: INTERNAL MEDICINE
Payer: COMMERCIAL

## 2018-01-24 VITALS
SYSTOLIC BLOOD PRESSURE: 137 MMHG | RESPIRATION RATE: 18 BRPM | BODY MASS INDEX: 46.58 KG/M2 | HEIGHT: 62 IN | OXYGEN SATURATION: 96 % | HEART RATE: 97 BPM | DIASTOLIC BLOOD PRESSURE: 82 MMHG | TEMPERATURE: 98.9 F | WEIGHT: 253.13 LBS

## 2018-01-24 DIAGNOSIS — E55.9 VITAMIN D DEFICIENCY: Primary | ICD-10-CM

## 2018-01-24 DIAGNOSIS — R05.9 COUGH: ICD-10-CM

## 2018-01-24 DIAGNOSIS — R73.02 GLUCOSE INTOLERANCE (IMPAIRED GLUCOSE TOLERANCE): ICD-10-CM

## 2018-01-24 PROBLEM — E66.01 OBESITY, MORBID (HCC): Status: ACTIVE | Noted: 2018-01-24

## 2018-01-24 PROCEDURE — 71046 X-RAY EXAM CHEST 2 VIEWS: CPT

## 2018-01-24 RX ORDER — LEVALBUTEROL TARTRATE 45 UG/1
2 AEROSOL, METERED ORAL
Qty: 1 INHALER | Refills: 0 | Status: SHIPPED | OUTPATIENT
Start: 2018-01-24

## 2018-01-24 RX ORDER — GUAIFENESIN 600 MG/1
600 TABLET, EXTENDED RELEASE ORAL 2 TIMES DAILY
Qty: 45 TAB | Refills: 0 | Status: SHIPPED | OUTPATIENT
Start: 2018-01-24 | End: 2018-10-11 | Stop reason: ALTCHOICE

## 2018-01-24 RX ORDER — AZITHROMYCIN 250 MG/1
250 TABLET, FILM COATED ORAL SEE ADMIN INSTRUCTIONS
Qty: 6 TAB | Refills: 0 | Status: SHIPPED | OUTPATIENT
Start: 2018-01-24 | End: 2018-01-29

## 2018-01-24 NOTE — PATIENT INSTRUCTIONS

## 2018-01-24 NOTE — MR AVS SNAPSHOT
[address] [facility] 
 
 
 [address] [place] [address] [facility] 
[phone] Patient: Kiki Jonas MRN: [id number] AKR:[id number] Visit Information Date & Time Provider Department Dept. Phone Encounter #  
 1/24/2018 10:00 AM Graham Doe MD [facility] [address] [id number] Upcoming Health Maintenance Date Due Hepatitis C Screening 1960 Pneumococcal 19-64 Medium Risk (1 of 1 - PPSV23) 1/16/1981 FOBT Q 1 YEAR AGE 50-75 1/16/2012 BREAST CANCER SCRN MAMMOGRAM 6/4/2016 Influenza Age 5 to Adult 8/1/2017 PAP AKA CERVICAL CYTOLOGY 2/2/2020 DTaP/Tdap/Td series (2 - Td) 9/10/2025 Allergies as of 1/24/2018  Review Complete On: 1/24/2018 By: Dawna Fernandez LPN Severity Noted Reaction Type Reactions Egg  06/04/2014    Other (comments) Abdominal pain Current Immunizations  Reviewed on 9/10/2015 Name Date Tdap 9/10/2015 Not reviewed this visit You Were Diagnosed With   
  
 Codes Comments Vitamin D deficiency    -  Primary ICD-10-CM: E55.9 ICD-9-CM: 268.9 Cough     ICD-10-CM: R05 ICD-9-CM: 786.2 Glucose intolerance (impaired glucose tolerance)     ICD-10-CM: R73.02 
ICD-9-CM: 790.22 Vitals BP Pulse Temp Resp Height(growth percentile) Weight(growth percentile) 137/82 (BP 1 Location: Left arm, BP Patient Position: Sitting) 97 98.9 °F (37.2 °C) (Oral) 18 5' 2\" (1.575 m) 253 lb 2 oz (114.8 kg) LMP SpO2 BMI OB Status Smoking Status 07/13/2007 96% 46.3 kg/m2 Postmenopausal Current Every Day Smoker BMI and BSA Data Body Mass Index Body Surface Area  
 46.3 kg/m 2 2.24 m 2 Preferred Pharmacy Pharmacy Name Phone [id number] [place], [address] [facility] [phone] Your Updated Medication List  
  
   
 This list is accurate as of: 1/24/18 11:38 AM.  Always use your most recent med list.  
  
  
  
  
 azithromycin 250 mg tablet Commonly known as:  Arti Bry Take 1 Tab by mouth See Admin Instructions for 5 days. dextroamphetamine-amphetamine 15 mg tablet Commonly known as:  ADDERALL Take 1 Tab (15 mg total) by mouth two (2) times a day. Max Daily Amount: 30 mg  
  
 dilTIAZem  mg ER capsule Commonly known as:  CARDIZEM CD Take 1 Cap by mouth daily for 90 days. Indications: hypertension EFFEXOR XR 75 mg capsule Generic drug:  venlafaxine-SR Take 225 mg by mouth daily. guaiFENesin  mg ER tablet Commonly known as:  Carlin & Carlin Take 1 Tab by mouth two (2) times a day. Take with water  
  
 levalbuterol tartrate 45 mcg/actuation inhaler Commonly known as:  Deidra Hall Take 2 Puffs by inhalation every four (4) hours as needed for Wheezing or Shortness of Breath. LORazepam 1 mg tablet Commonly known as:  ATIVAN Take 1 mg by mouth two (2) times daily as needed for Anxiety. SEROquel 25 mg tablet Generic drug:  QUEtiapine Take 50 mg by mouth nightly. simvastatin 10 mg tablet Commonly known as:  ZOCOR  
TAKE 1 TABLET BY MOUTH EVERY NIGHT SYNTHROID 112 mcg tablet Generic drug:  levothyroxine Take 112 mcg by mouth Daily (before breakfast). VITAMIN D3 2,000 unit Tab Generic drug:  cholecalciferol (vitamin D3) Take 2,000 Units by mouth five (5) days a week. 1 Tablet Monday - Friday. 2 Tablets Sat, Sun.  
  
  
  
  
Prescriptions Sent to Pharmacy Refills  
 azithromycin (ZITHROMAX) 250 mg tablet 0 Sig: Take 1 Tab by mouth See Admin Instructions for 5 days. Class: Normal  
 Pharmacy: [id number], [address] [place] [address] Ph #: [phone]  Route: Oral  
 levalbuterol tartrate (XOPENEX) 45 mcg/actuation inhaler 0  
 Sig: Take 2 Puffs by inhalation every four (4) hours as needed for Wheezing or Shortness of Breath. Class: Normal  
 Pharmacy: [facility]/pharmacy #[id number], [address] [place] [address] Ph #: [phone] Route: Inhalation  
 guaiFENesin ER (MUCINEX) 600 mg ER tablet 0 Sig: Take 1 Tab by mouth two (2) times a day. Take with water Class: Normal  
 Pharmacy: [facility]/pharmacy #[id number]- [place], [address] [place] [address] Ph #: [phone] Route: Oral  
  
We Performed the Following CBC WITH AUTOMATED DIFF [07442 CPT(R)] HEMOGLOBIN A1C WITH EAG [46096 CPT(R)] MAGNESIUM [id number] CPT(R)] METABOLIC PANEL, COMPREHENSIVE [48985 CPT(R)] MYCOPLASMA AB, IGG/IGM [id number] CPT(R)] VITAMIN D, 25 HYDROXY [id number] CPT(R)] To-Do List   
 01/24/2018 Imaging:  XR CHEST PA LAT Patient Instructions Cough: Care Instructions Your Care Instructions A cough is your body's response to something that bothers your throat or airways. Many things can cause a cough. You might cough because of a cold or the flu, bronchitis, or asthma. Smoking, postnasal drip, allergies, and stomach acid that backs up into your throat also can cause coughs. A cough is a symptom, not a disease. Most coughs stop when the cause, such as a cold, goes away. You can take a few steps at home to cough less and feel better. Follow-up care is a key part of your treatment and safety. Be sure to make and go to all appointments, and call your doctor if you are having problems. It's also a good idea to know your test results and keep a list of the medicines you take. How can you care for yourself at home? · Drink lots of water and other fluids. This helps thin the mucus and soothes a dry or sore throat. Honey or lemon juice in hot water or tea may ease a dry cough. · Take cough medicine as directed by your doctor. · Prop up your head on pillows to help you breathe and ease a dry cough. · Try cough drops to soothe a dry or sore throat. Cough drops don't stop a cough. Medicine-flavored cough drops are no better than candy-flavored drops or hard candy. · Do not smoke. Avoid secondhand smoke. If you need help quitting, talk to your doctor about stop-smoking programs and medicines. These can increase your chances of quitting for good. When should you call for help? Call 911 anytime you think you may need emergency care. For example, call if: 
? · You have severe trouble breathing. ?Call your doctor now or seek immediate medical care if: 
? · You cough up blood. ? · You have new or worse trouble breathing. ? · You have a new or higher fever. ? · You have a new rash. ? Watch closely for changes in your health, and be sure to contact your doctor if: 
? · You cough more deeply or more often, especially if you notice more mucus or a change in the color of your mucus. ? · You have new symptoms, such as a sore throat, an earache, or sinus pain. ? · You do not get better as expected. Where can you learn more? Go to http://lavelle-rhys.info/. Enter D279 in the search box to learn more about \"Cough: Care Instructions. \" Current as of: May 12, 2017 Content Version: 11.4 © 7946-3116 [employer]. Care instructions adapted under license by [employer] (which disclaims liability or warranty for this information). If you have questions about a medical condition or this instruction, always ask your healthcare professional. [place] [address] any warranty or liability for your use of this information. Introducing hospitals & HEALTH SERVICES! Dear Teto Cantor: Thank you for requesting a Cor[employer]c account. Our records indicate that you already have an active Correlec account. You can access your account anytime at [email or web address] Did you know that you can access your hospital and ER discharge instructions at any time in Artspace? You can also review all of your test results from your hospital stay or ER visit. Additional Information If you have questions, please visit the Frequently Asked Questions section of the Artspace website at [email or web address] Remember, Artspace is NOT to be used for urgent needs. For medical emergencies, dial 911. Now available from your iPhone and Android! Please provide this summary of care documentation to your next provider. Your primary care clinician is listed as Vini Saucedo. If you have any questions after today's visit, please call [phone].

## 2018-01-24 NOTE — PROGRESS NOTES
Chief Complaint   Patient presents with   [facility] Follow Up     Pt presents for follow up of PNA. She was diagnosed with RSV pneumonia but c/o low grade temps and green sputum/phlegm with cough. She reports she is drinking fluids. No cp or sob. She does feel improved from her ER visit. She can not take albuterol because it makes her jittery. She is using robitussin prn. Glucose intolerance  She has been compliant with heart healthy diabetic diet at home. Cardiovascular Review:  The patient has hypertension and hyperlipidemia. Diet and Lifestyle: generally follows a low fat low cholesterol diet, generally follows a low sodium diet, exercises sporadically  Home BP Monitoring: is not measured at home. Pertinent ROS: taking medications as instructed, no medication side effects noted, no TIA's, no chest pain on exertion, no dyspnea on exertion, no swelling of ankles. Past Medical History:   Diagnosis Date    Anxiety and depression     HTN (hypertension)     Hyperlipidemia LDL goal < 130     Hypothyroid     Morbid obesity with BMI of 40.0-44.9, adult (HCC)     Tobacco abuse      Past Surgical History:   Procedure Laterality Date    HX APPENDECTOMY      HX DILATION AND CURETTAGE       Social History     Social History    Marital status:      Spouse name: N/A    Number of children: N/A    Years of education: N/A     Social History Main Topics    Smoking status: Current Every Day Smoker     Packs/day: 1.00    Smokeless tobacco: Never Used      Comment:  1 ppd/40+ years    Alcohol use No    Drug use: No      Comment: 1 pack a day    Sexual activity: No     Other Topics Concern    None     Social History Narrative            2 children        Not employed/filing for disability    Stopped working after children were born 20 yo and 11 yo     No family history on file.   Current Outpatient Prescriptions   Medication Sig Dispense Refill    azithromycin (ZITHROMAX) 250 mg tablet Take 1 Tab by mouth See Admin Instructions for 5 days. 6 Tab 0    levalbuterol tartrate (XOPENEX) 45 mcg/actuation inhaler Take 2 Puffs by inhalation every four (4) hours as needed for Wheezing or Shortness of Breath. 1 Inhaler 0    guaiFENesin ER (MUCINEX) 600 mg ER tablet Take 1 Tab by mouth two (2) times a day. Take with water 45 Tab 0    venlafaxine-SR (EFFEXOR XR) 75 mg capsule Take 225 mg by mouth daily.  QUEtiapine (SEROQUEL) 25 mg tablet Take 50 mg by mouth nightly.  dilTIAZem CD (CARDIZEM CD) 240 mg ER capsule Take 1 Cap by mouth daily for 90 days. Indications: hypertension 90 Cap 0    simvastatin (ZOCOR) 10 mg tablet TAKE 1 TABLET BY MOUTH EVERY NIGHT 90 Tab 0    dextroamphetamine-amphetamine (ADDERALL) 15 mg tablet Take 1 Tab (15 mg total) by mouth two (2) times a day. Max Daily Amount: 30 mg 14 Tab 0    LORazepam (ATIVAN) 1 mg tablet Take 1 mg by mouth two (2) times daily as needed for Anxiety.  cholecalciferol, vitamin D3, (VITAMIN D3) 2,000 unit tab Take 2,000 Units by mouth five (5) days a week. 1 Tablet Monday - Friday. 2 Tablets Sat, Sun.      levothyroxine (SYNTHROID) 112 mcg tablet Take 112 mcg by mouth Daily (before breakfast). Allergies   Allergen Reactions    Egg Other (comments)     Abdominal pain       Review of Systems - General ROS: positive for  - chills, fatigue, fever and sleep disturbance  negative for - hot flashes or weight loss  Cardiovascular ROS: no chest pain or dyspnea on exertion  Respiratory ROS: positive for - cough and shortness of breath  negative for - stridor or tachypnea    Visit Vitals    /82 (BP 1 Location: Left arm, BP Patient Position: Sitting)    Pulse 97    Temp 98.9 °F (37.2 °C) (Oral)    Resp 18    Ht 5' 2\" (1.575 m)    Wt 253 lb 2 oz (114.8 kg)    LMP 07/13/2007    SpO2 96%    BMI 46.3 kg/m2     General Appearance:  Well developed, well nourished,alert and oriented x 3, and individual in no acute distress. Ears/Nose/Mouth/Throat:   Hearing grossly normal.         Neck: Supple, no lad, no bruits   Chest:   Lungs clear to auscultation bilaterally. Cardiovascular:  Regular rate and rhythm, S1, S2 normal, no murmur. Abdomen:   Soft, non-tender, bowel sounds are active. Extremities: No edema bilaterally. Skin: Warm and dry, no suspicious lesions                 Diagnoses and all orders for this visit:    1. Vitamin D deficiency  Hx of depression  Optimize   -     METABOLIC PANEL, COMPREHENSIVE  -     MAGNESIUM  -     VITAMIN D, 25 HYDROXY    2. Cough  Improving but still with low grade temp /fevers  -     XR CHEST PA LAT; Future  -     MYCOPLASMA AB, IGG/IGM  -     CBC WITH AUTOMATED DIFF  -     levalbuterol tartrate (XOPENEX) 45 mcg/actuation inhaler; Take 2 Puffs by inhalation every four (4) hours as needed for Wheezing or Shortness of Breath.  -     guaiFENesin ER (MUCINEX) 600 mg ER tablet; Take 1 Tab by mouth two (2) times a day. Take with water    3. Glucose intolerance (impaired glucose tolerance)  -     HEMOGLOBIN A1C WITH EAG  -     METABOLIC PANEL, COMPREHENSIVE  -     MAGNESIUM    Other orders  -     azithromycin (ZITHROMAX) 250 mg tablet; Take 1 Tab by mouth See Admin Instructions for 5 days. I spent 25 min with this patient and >50% of the time was spent on counseling and management of current pulm symptoms. I reviewed her labs with her and discussed neg blood cx and + RSV as likely source for her inpt hospitalization. We discussed mycoplasma and would like to recheck as sx are not subsiding very well. This note will not be viewable in [address]

## 2018-01-25 LAB
25(OH)D3+25(OH)D2 SERPL-MCNC: 32.4 NG/ML (ref 30–100)
ALBUMIN SERPL-MCNC: 4.1 G/DL (ref 3.5–5.5)
ALBUMIN/GLOB SERPL: 1.5 {RATIO} (ref 1.2–2.2)
ALP SERPL-CCNC: 115 IU/L (ref 39–117)
ALT SERPL-CCNC: 28 IU/L (ref 0–32)
AST SERPL-CCNC: 37 IU/L (ref 0–40)
BASOPHILS # BLD AUTO: 0 X10E3/UL (ref 0–0.2)
BASOPHILS NFR BLD AUTO: 0 %
BILIRUB SERPL-MCNC: 0.5 MG/DL (ref 0–1.2)
BUN SERPL-MCNC: 9 MG/DL (ref 6–24)
BUN/CREAT SERPL: 10 (ref 9–23)
CALCIUM SERPL-MCNC: 8.9 MG/DL (ref 8.7–10.2)
CHLORIDE SERPL-SCNC: 100 MMOL/L (ref 96–106)
CO2 SERPL-SCNC: 24 MMOL/L (ref 18–29)
CREAT SERPL-MCNC: 0.92 MG/DL (ref 0.57–1)
EOSINOPHIL # BLD AUTO: 0 X10E3/UL (ref 0–0.4)
EOSINOPHIL NFR BLD AUTO: 0 %
ERYTHROCYTE [DISTWIDTH] IN BLOOD BY AUTOMATED COUNT: 14.3 % (ref 12.3–15.4)
EST. AVERAGE GLUCOSE BLD GHB EST-MCNC: 108 MG/DL
GFR SERPLBLD CREATININE-BSD FMLA CKD-EPI: 70 ML/MIN/1.73
GFR SERPLBLD CREATININE-BSD FMLA CKD-EPI: 80 ML/MIN/1.73
GLOBULIN SER CALC-MCNC: 2.8 G/DL (ref 1.5–4.5)
GLUCOSE SERPL-MCNC: 81 MG/DL (ref 65–99)
HBA1C MFR BLD: 5.4 % (ref 4.8–5.6)
HCT VFR BLD AUTO: 41.7 % (ref 34–46.6)
HGB BLD-MCNC: 14.1 G/DL (ref 11.1–15.9)
IMM GRANULOCYTES # BLD: 0.1 X10E3/UL (ref 0–0.1)
IMM GRANULOCYTES NFR BLD: 1 %
LYMPHOCYTES # BLD AUTO: 3.1 X10E3/UL (ref 0.7–3.1)
LYMPHOCYTES NFR BLD AUTO: 29 %
MAGNESIUM SERPL-MCNC: 2.2 MG/DL (ref 1.6–2.3)
MCH RBC QN AUTO: 30.7 PG (ref 26.6–33)
MCHC RBC AUTO-ENTMCNC: 33.8 G/DL (ref 31.5–35.7)
MCV RBC AUTO: 91 FL (ref 79–97)
MONOCYTES # BLD AUTO: 1 X10E3/UL (ref 0.1–0.9)
MONOCYTES NFR BLD AUTO: 9 %
NEUTROPHILS # BLD AUTO: 6.8 X10E3/UL (ref 1.4–7)
NEUTROPHILS NFR BLD AUTO: 61 %
PLATELET # BLD AUTO: 301 X10E3/UL (ref 150–379)
POTASSIUM SERPL-SCNC: 4 MMOL/L (ref 3.5–5.2)
PROT SERPL-MCNC: 6.9 G/DL (ref 6–8.5)
RBC # BLD AUTO: 4.6 X10E6/UL (ref 3.77–5.28)
SODIUM SERPL-SCNC: 140 MMOL/L (ref 134–144)
WBC # BLD AUTO: 11 X10E3/UL (ref 3.4–10.8)

## 2018-01-28 NOTE — PROGRESS NOTES
Please call pt to see how she is doing. Can you also find out what happened to her mycoplasm result?  Thanks, jonathan

## 2018-02-01 ENCOUNTER — TELEPHONE (OUTPATIENT)
Dept: INTERNAL MEDICINE CLINIC | Age: 58
End: 2018-02-01

## 2018-02-01 NOTE — TELEPHONE ENCOUNTER
Pt called back in to say the antibiotic helped so much and she is feeling much better. No more fever or green mucus,  she still has a small cough but so much better. She just heard message from To [place] and wanted to call her back. She wanted to say thank you so much for calling and checking on her.  If would like to call her back the best number is [phone]

## 2018-02-05 NOTE — PROGRESS NOTES
Called pt, she did not answer, [facility] stated they never received the requistion or the blood for the mycoplasm, pt will need to re-do.

## 2018-03-16 DIAGNOSIS — E78.00 PURE HYPERCHOLESTEROLEMIA: Primary | ICD-10-CM

## 2018-03-18 DIAGNOSIS — E78.00 HYPERCHOLESTEREMIA: ICD-10-CM

## 2018-03-19 RX ORDER — SIMVASTATIN 10 MG/1
TABLET, FILM COATED ORAL
Qty: 90 TAB | Refills: 0 | Status: SHIPPED | OUTPATIENT
Start: 2018-03-19 | End: 2018-06-15 | Stop reason: SDUPTHER

## 2018-03-19 RX ORDER — DILTIAZEM HYDROCHLORIDE 240 MG/1
CAPSULE, COATED, EXTENDED RELEASE ORAL
Qty: 90 CAP | Refills: 0 | Status: SHIPPED | OUTPATIENT
Start: 2018-03-19 | End: 2018-06-15 | Stop reason: SDUPTHER

## 2018-06-15 DIAGNOSIS — E78.00 HYPERCHOLESTEREMIA: ICD-10-CM

## 2018-06-15 RX ORDER — SIMVASTATIN 10 MG/1
TABLET, FILM COATED ORAL
Qty: 90 TAB | Refills: 0 | COMMUNITY
Start: 2018-06-15 | End: 2018-06-27 | Stop reason: SDUPTHER

## 2018-06-15 RX ORDER — DILTIAZEM HYDROCHLORIDE 240 MG/1
CAPSULE, COATED, EXTENDED RELEASE ORAL
Qty: 90 CAP | Refills: 0 | COMMUNITY
Start: 2018-06-15 | End: 2018-06-27 | Stop reason: SDUPTHER

## 2018-06-15 NOTE — TELEPHONE ENCOUNTER
Patient needs refills on her dilTIAZem CD (CARDIZEM CD) 240 mg ER capsule  And her simvastatin (ZOCOR) 10 mg tablet  [facility]  [phone]  Her no is [phone]

## 2018-06-17 DIAGNOSIS — E78.00 HYPERCHOLESTEREMIA: ICD-10-CM

## 2018-06-17 RX ORDER — DILTIAZEM HYDROCHLORIDE 240 MG/1
CAPSULE, EXTENDED RELEASE ORAL
Qty: 90 CAP | Refills: 0 | Status: SHIPPED | OUTPATIENT
Start: 2018-06-17 | End: 2018-09-18 | Stop reason: SDUPTHER

## 2018-06-17 RX ORDER — SIMVASTATIN 10 MG/1
TABLET, FILM COATED ORAL
Qty: 90 TAB | Refills: 0 | Status: SHIPPED | OUTPATIENT
Start: 2018-06-17 | End: 2018-06-27 | Stop reason: ALTCHOICE

## 2018-06-27 ENCOUNTER — OFFICE VISIT (OUTPATIENT)
Dept: INTERNAL MEDICINE CLINIC | Age: 58
End: 2018-06-27

## 2018-06-27 VITALS
HEIGHT: 62 IN | RESPIRATION RATE: 18 BRPM | OXYGEN SATURATION: 94 % | SYSTOLIC BLOOD PRESSURE: 150 MMHG | BODY MASS INDEX: 47.66 KG/M2 | DIASTOLIC BLOOD PRESSURE: 98 MMHG | TEMPERATURE: 98.6 F | WEIGHT: 259 LBS | HEART RATE: 89 BPM

## 2018-06-27 DIAGNOSIS — I10 ESSENTIAL HYPERTENSION: Primary | ICD-10-CM

## 2018-06-27 DIAGNOSIS — E78.2 MIXED HYPERLIPIDEMIA: ICD-10-CM

## 2018-06-27 DIAGNOSIS — E55.9 VITAMIN D DEFICIENCY: ICD-10-CM

## 2018-06-27 RX ORDER — FUROSEMIDE 20 MG/1
20 TABLET ORAL DAILY
Qty: 30 TAB | Refills: 2 | Status: SHIPPED | OUTPATIENT
Start: 2018-06-27 | End: 2019-11-13 | Stop reason: ALTCHOICE

## 2018-06-27 RX ORDER — PRAVASTATIN SODIUM 10 MG/1
10 TABLET ORAL
Qty: 90 TAB | Refills: 3 | Status: SHIPPED | OUTPATIENT
Start: 2018-06-27 | End: 2019-06-30 | Stop reason: SDUPTHER

## 2018-06-27 NOTE — PROGRESS NOTES
Chief Complaint   Patient presents with   [facility]     Since last visit she reports she is doing well. Depression and anxiety  Pt is seeing Dr. Fatmata Pastor. She will see pt in 6 weeks. Glucose intolerance  She has been compliant with heart healthy diabetic diet at home. She is on adderall 45 mg and was weaned down from higher dose. She is hoping to go to 60 mg tablet. She has not seen her counselor yet. Cardiovascular Review:  The patient has hypertension and hyperlipidemia. Diet and Lifestyle: generally follows a low fat low cholesterol diet, generally follows a low sodium diet, exercises sporadically  Home BP Monitoring: is not measured at home. Pertinent ROS: taking medications as instructed, no medication side effects noted, no TIA's, no chest pain on exertion, no dyspnea on exertion, no swelling of ankles. She reports her bp is elevated today because she is anxious. She is tearful when discussing her bp because she reports she does not want to have excessive medical issues. Nicotine addiction  Patient reports she is still smoking 1 pack of cigarettes per day. She is not interested in cutting back or stopping her smoking. She reports her stress level is too high.       Past Medical History:   Diagnosis Date    Anxiety and depression     HTN (hypertension)     Hyperlipidemia LDL goal < 130     Hypothyroid     Morbid obesity with BMI of 40.0-44.9, adult (HCC)     Tobacco abuse      Past Surgical History:   Procedure Laterality Date    HX APPENDECTOMY      HX DILATION AND CURETTAGE       Social History     Social History    Marital status:      Spouse name: N/A    Number of children: N/A    Years of education: N/A     Social History Main Topics    Smoking status: Current Every Day Smoker     Packs/day: 1.00    Smokeless tobacco: Never Used      Comment:  1 ppd/40+ years    Alcohol use No    Drug use: No      Comment: 1 pack a day    Sexual activity: No     Other Topics Concern    None     Social History Narrative            2 children        Not employed/filing for disability    Stopped working after children were born 22 yo and 13 yo     History reviewed. No pertinent family history. Current Outpatient Prescriptions   Medication Sig Dispense Refill    diltiazem (TIAZAC) 240 mg SR capsule TAKE ONE CAPSULE BY MOUTH EVERY DAY 90 Cap 0    simvastatin (ZOCOR) 10 mg tablet TAKE 1 TABLET BY MOUTH EVERY NIGHT 90 Tab 0    venlafaxine-SR (EFFEXOR XR) 75 mg capsule Take 225 mg by mouth daily.  QUEtiapine (SEROQUEL) 25 mg tablet Take 50 mg by mouth nightly.  dextroamphetamine-amphetamine (ADDERALL) 15 mg tablet Take 1 Tab (15 mg total) by mouth two (2) times a day. Max Daily Amount: 30 mg 14 Tab 0    LORazepam (ATIVAN) 1 mg tablet Take 1 mg by mouth two (2) times daily as needed for Anxiety.  cholecalciferol, vitamin D3, (VITAMIN D3) 2,000 unit tab Take 2,000 Units by mouth five (5) days a week. 1 Tablet Monday - Friday. 2 Tablets Sat, Sun.      levothyroxine (SYNTHROID) 112 mcg tablet Take 112 mcg by mouth Daily (before breakfast).  levalbuterol tartrate (XOPENEX) 45 mcg/actuation inhaler Take 2 Puffs by inhalation every four (4) hours as needed for Wheezing or Shortness of Breath. 1 Inhaler 0    guaiFENesin ER (MUCINEX) 600 mg ER tablet Take 1 Tab by mouth two (2) times a day.  Take with water 45 Tab 0     Allergies   Allergen Reactions    Egg Other (comments)     Abdominal pain       Review of Systems - General ROS: positive for  - chills, fatigue, fever and sleep disturbance  negative for - hot flashes or weight loss  Cardiovascular ROS: no chest pain or dyspnea on exertion  Respiratory ROS: positive for - cough and shortness of breath  negative for - stridor or tachypnea    Visit Vitals    BP (!) 150/98 (BP 1 Location: Left arm, BP Patient Position: Sitting)    Pulse 89    Temp 98.6 °F (37 °C) (Oral)    Resp 18    Ht 5' 2\" (1.575 m)    Wt 259 lb (117.5 kg)    [facility] 07/13/2007    SpO2 94%    BMI 47.37 kg/m2     General Appearance:  Well developed, well nourished,alert and oriented x 3, and individual in no acute distress. Ears/Nose/Mouth/Throat:   Hearing grossly normal.         Neck: Supple, no lad, no bruits   Chest:   Lungs clear to auscultation bilaterally. Cardiovascular:  Regular rate and rhythm, S1, S2 normal, no murmur. Abdomen:   Soft, non-tender, bowel sounds are active. Extremities: No edema bilaterally. Skin: Warm and dry, no suspicious lesions                 Diagnoses and all orders for this visit:    1. Essential hypertension  Not controlled. Discussed with patient. I will add Lasix to her regimen as she has been on this before in the past.  I would like for her to follow-up in 3 months. She will buy a blood pressure cuff and monitor her blood pressure. I made it clear to her that she needs to return to clinic in 1 month if her blood pressure has not been controlled to 140/90. If her blood pressure stays within a stable range of 140/90 she can follow-up in August for her annual.  -     METABOLIC PANEL, COMPREHENSIVE  -     furosemide (LASIX) 20 mg tablet; Take 1 Tab by mouth daily. 2. Vitamin D deficiency  Patient is taking 2000 international units per day over-the-counter. She has a history of deficiency in the past so we will check her current level. This may be affecting her depression or anxiety. -     VITAMIN D, 25 HYDROXY    3. Mixed hyperlipidemia  She was notified that the combination of diltiazem and simvastatin could increase both levels. Discussed with patient we will get her labs today on the 10 mg of simvastatin. She will start Pravachol and we will check her lipids in 3 months and compare simvastatin 10 mg versus the Pravachol 10 mg. Today's labs will reflect the 10 mg of simvastatin. -     pravastatin (PRAVACHOL) 10 mg tablet; Take 1 Tab by mouth nightly.   -     LIPID PANEL    Noted patient was tearful on exam when discussing her blood pressure issues. Provided support. She prefers not to stop smoking currently. This note will not be viewable in [address]

## 2018-06-27 NOTE — MR AVS SNAPSHOT
[address] [facility] 
 
 
 [address] [place] [address] [facility] 
[phone] Patient: Bob Calvert MRN: [id number] CGA:[birth year] Visit Information Date & Time Provider Department Dept. Phone Encounter #  
 6/27/2018  1:00 PM Gita Burch MD [facility] [address] [id number] Upcoming Health Maintenance Date Due Hepatitis C Screening 1960 Pneumococcal 19-64 Medium Risk (1 of 1 - PPSV23) 1/16/1981 FOBT Q 1 YEAR AGE 50-75 1/16/2012 BREAST CANCER SCRN MAMMOGRAM 6/4/2016 Influenza Age 5 to Adult 8/1/2018 PAP AKA CERVICAL CYTOLOGY 2/2/2020 DTaP/Tdap/Td series (2 - Td) 9/10/2025 Allergies as of 6/27/2018  Review Complete On: 6/27/2018 By: Gita Burch MD  
  
 Severity Noted Reaction Type Reactions Egg  06/04/2014    Other (comments) Abdominal pain Current Immunizations  Reviewed on 9/10/2015 Name Date Tdap 9/10/2015 Not reviewed this visit You Were Diagnosed With   
  
 Codes Comments Essential hypertension    -  Primary ICD-10-CM: I10 
ICD-9-CM: 401.9 Vitamin D deficiency     ICD-10-CM: E55.9 ICD-9-CM: 268.9 Mixed hyperlipidemia     ICD-10-CM: E78.2 ICD-9-CM: 272.2 Vitals BP Pulse Temp Resp Height(growth percentile) Weight(growth percentile) (!) 168/101 (BP 1 Location: Left arm, BP Patient Position: Sitting) 89 98.6 °F (37 °C) (Oral) 18 5' 2\" (1.575 m) 259 lb (117.5 kg) LMP SpO2 BMI OB Status Smoking Status 07/13/2007 94% 47.37 kg/m2 Postmenopausal Current Every Day Smoker Vitals History BMI and BSA Data Body Mass Index Body Surface Area  
 47.37 kg/m 2 2.27 m 2 Preferred Pharmacy Pharmacy Name Phone [id number], [address] [phone] Your Updated Medication List  
  
   
 This list is accurate as of 6/27/18  1:55 PM.  Always use your most recent med list.  
  
  
  
  
 dextroamphetamine-amphetamine 15 mg tablet Commonly known as:  ADDERALL Take 1 Tab (15 mg total) by mouth two (2) times a day. Max Daily Amount: 30 mg  
  
 diltiazem 240 mg SR capsule Commonly known as:  [facility] TAKE ONE CAPSULE BY MOUTH EVERY DAY  
  
 EFFEXOR XR 75 mg capsule Generic drug:  venlafaxine-SR Take 225 mg by mouth daily. furosemide 20 mg tablet Commonly known as:  LASIX Take 1 Tab by mouth daily. guaiFENesin  mg ER tablet Commonly known as:  [employer] Take 1 Tab by mouth two (2) times a day. Take with water  
  
 levalbuterol tartrate 45 mcg/actuation inhaler Commonly known as:  Casimer Simper Take 2 Puffs by inhalation every four (4) hours as needed for Wheezing or Shortness of Breath. LORazepam 1 mg tablet Commonly known as:  ATIVAN Take 1 mg by mouth two (2) times daily as needed for Anxiety. pravastatin 10 mg tablet Commonly known as:  PRAVACHOL Take 1 Tab by mouth nightly. SEROquel 25 mg tablet Generic drug:  QUEtiapine Take 50 mg by mouth nightly. SYNTHROID 112 mcg tablet Generic drug:  levothyroxine Take 112 mcg by mouth Daily (before breakfast). VITAMIN D3 2,000 unit Tab Generic drug:  cholecalciferol (vitamin D3) Take 2,000 Units by mouth five (5) days a week. 1 Tablet Monday - Friday. 2 Tablets Sat, Sun.  
  
  
  
  
Prescriptions Sent to Pharmacy Refills  
 pravastatin (PRAVACHOL) 10 mg tablet 3 Sig: Take 1 Tab by mouth nightly. Class: Normal  
 Pharmacy: [facility]/pharmacy #[id number] Ph #: [phone] Route: Oral  
 furosemide (LASIX) 20 mg tablet 2 Sig: Take 1 Tab by mouth daily. Class: Normal  
 Pharmacy: [facility]/pharmacy #[id number] Ph #: [phone]  Route: Oral  
  
 We Performed the Following LIPID PANEL [57053 CPT(R)] METABOLIC PANEL, COMPREHENSIVE [02545 CPT(R)] VITAMIN D, 25 HYDROXY [id number] CPT(R)] Introducing [facility] SERVICES! Dear Tete Stanton: Thank you for requesting a rPath account. Our records indicate that you already have an active rPath account. You can access your account anytime at [email or web address] Did you know that you can access your hospital and ER discharge instructions at any time in rPath? You can also review all of your test results from your hospital stay or ER visit. Additional Information If you have questions, please visit the Frequently Asked Questions section of the rPath website at [email or web address] Remember, rPath is NOT to be used for urgent needs. For medical emergencies, dial 911. Now available from your iPhone and Android! Please provide this summary of care documentation to your next provider. Your primary care clinician is listed as Kesha Garcia. If you have any questions after today's visit, please call [phone].

## 2018-06-28 LAB
25(OH)D3+25(OH)D2 SERPL-MCNC: 37.8 NG/ML (ref 30–100)
ALBUMIN SERPL-MCNC: 4.3 G/DL (ref 3.5–5.5)
ALBUMIN/GLOB SERPL: 1.7 {RATIO} (ref 1.2–2.2)
ALP SERPL-CCNC: 132 IU/L (ref 39–117)
ALT SERPL-CCNC: 17 IU/L (ref 0–32)
AST SERPL-CCNC: 20 IU/L (ref 0–40)
BILIRUB SERPL-MCNC: 0.5 MG/DL (ref 0–1.2)
BUN SERPL-MCNC: 13 MG/DL (ref 6–24)
BUN/CREAT SERPL: 13 (ref 9–23)
CALCIUM SERPL-MCNC: 9.1 MG/DL (ref 8.7–10.2)
CHLORIDE SERPL-SCNC: 101 MMOL/L (ref 96–106)
CHOLEST SERPL-MCNC: 188 MG/DL (ref 100–199)
CO2 SERPL-SCNC: 24 MMOL/L (ref 20–29)
CREAT SERPL-MCNC: 0.97 MG/DL (ref 0.57–1)
GLOBULIN SER CALC-MCNC: 2.5 G/DL (ref 1.5–4.5)
GLUCOSE SERPL-MCNC: 84 MG/DL (ref 65–99)
HDLC SERPL-MCNC: 41 MG/DL
INTERPRETATION, 910389: NORMAL
LDLC SERPL CALC-MCNC: 125 MG/DL (ref 0–99)
POTASSIUM SERPL-SCNC: 4.5 MMOL/L (ref 3.5–5.2)
PROT SERPL-MCNC: 6.8 G/DL (ref 6–8.5)
SODIUM SERPL-SCNC: 140 MMOL/L (ref 134–144)
TRIGL SERPL-MCNC: 108 MG/DL (ref 0–149)
VLDLC SERPL CALC-MCNC: 22 MG/DL (ref 5–40)

## 2018-10-11 ENCOUNTER — OFFICE VISIT (OUTPATIENT)
Dept: INTERNAL MEDICINE CLINIC | Age: 58
End: 2018-10-11

## 2018-10-11 VITALS
SYSTOLIC BLOOD PRESSURE: 141 MMHG | RESPIRATION RATE: 18 BRPM | DIASTOLIC BLOOD PRESSURE: 82 MMHG | BODY MASS INDEX: 47.71 KG/M2 | OXYGEN SATURATION: 95 % | HEART RATE: 83 BPM | HEIGHT: 62 IN | WEIGHT: 259.25 LBS | TEMPERATURE: 99.2 F

## 2018-10-11 DIAGNOSIS — I10 ESSENTIAL HYPERTENSION: ICD-10-CM

## 2018-10-11 DIAGNOSIS — Z00.00 WELL ADULT EXAM: Primary | ICD-10-CM

## 2018-10-11 DIAGNOSIS — R10.30 LOWER ABDOMINAL PAIN: ICD-10-CM

## 2018-10-11 DIAGNOSIS — F41.9 ANXIETY: ICD-10-CM

## 2018-10-11 DIAGNOSIS — Z11.59 ENCOUNTER FOR HEPATITIS C SCREENING TEST FOR LOW RISK PATIENT: ICD-10-CM

## 2018-10-11 DIAGNOSIS — Z12.4 CERVICAL CANCER SCREENING: ICD-10-CM

## 2018-10-11 DIAGNOSIS — B07.0 PLANTAR WART: ICD-10-CM

## 2018-10-11 DIAGNOSIS — R53.82 CHRONIC FATIGUE: ICD-10-CM

## 2018-10-11 DIAGNOSIS — Z23 IMMUNIZATION DUE: ICD-10-CM

## 2018-10-11 DIAGNOSIS — R39.15 URINARY URGENCY: ICD-10-CM

## 2018-10-11 DIAGNOSIS — Z12.83 SKIN CANCER SCREENING: ICD-10-CM

## 2018-10-11 DIAGNOSIS — E78.2 MIXED HYPERLIPIDEMIA: ICD-10-CM

## 2018-10-11 DIAGNOSIS — N93.9 VAGINAL SPOTTING: ICD-10-CM

## 2018-10-11 PROBLEM — F32.A MILD DEPRESSION: Status: ACTIVE | Noted: 2018-10-11

## 2018-10-11 PROBLEM — E03.4 HYPOTHYROIDISM DUE TO ACQUIRED ATROPHY OF THYROID: Status: ACTIVE | Noted: 2018-10-11

## 2018-10-11 LAB
BILIRUB UR QL STRIP: NEGATIVE
GLUCOSE UR-MCNC: NEGATIVE MG/DL
KETONES P FAST UR STRIP-MCNC: NEGATIVE MG/DL
PH UR STRIP: 6.5 [PH] (ref 4.6–8)
PROT UR QL STRIP: NEGATIVE
SP GR UR STRIP: 1 (ref 1–1.03)
UA UROBILINOGEN AMB POC: NORMAL (ref 0.2–1)
URINALYSIS CLARITY POC: CLEAR
URINALYSIS COLOR POC: YELLOW
URINE BLOOD POC: NORMAL
URINE LEUKOCYTES POC: NEGATIVE
URINE NITRITES POC: NEGATIVE

## 2018-10-11 RX ORDER — CIPROFLOXACIN 500 MG/1
500 TABLET ORAL 2 TIMES DAILY
Qty: 14 TAB | Refills: 0 | Status: SHIPPED | OUTPATIENT
Start: 2018-10-11 | End: 2018-12-07 | Stop reason: ALTCHOICE

## 2018-10-11 RX ORDER — KETOCONAZOLE 20 MG/G
CREAM TOPICAL DAILY
Qty: 15 G | Refills: 0 | Status: SHIPPED | OUTPATIENT
Start: 2018-10-11 | End: 2019-11-13 | Stop reason: ALTCHOICE

## 2018-10-11 NOTE — PROGRESS NOTES
Shira Servin is a 66 y.o. female and presents with Well Shaheed Mckee Patient presents for her well woman visit. Since last visit she reports she was diagnosed with another UTI from urology. She is status post 
Subjective: Antibiotic completion with ciprofloxacin. Cardiovascular Review: 
The patient has hypertension and hyperlipidemia. Diet and Lifestyle: generally follows a low fat low cholesterol diet Home BP Monitoring: is not measured at home. Pertinent ROS: taking medications as instructed, no medication side effects noted, no TIA's, no chest pain on exertion, no dyspnea on exertion, no swelling of ankles. Patient is currently taking pravastatin 10 mg. She was transitioned off her other statin as it interacted with her diltiazem. Thyroid Review: 
Patient is seen for followup of hypothyroidism. Thyroid ROS: denies fatigue, weight changes, heat/cold intolerance, bowel/skin changes or CVS symptoms. Patient is being followed by Dr. Taylor Gallagher. Subjective:  
 
Shira Servin is a 66 y.o. female who complains of frequency for 2 days. Patient also complains of stomach ache. Patient denies fever, headache and vaginal discharge. Patient does have a history of recurrent UTI. Patient does have a history of pyelonephritis. Anxiety/depression/ADD Patient reports she is following with Dr. Clarence Aviles. She has been compliant with medications. She is seeing her every 6 weeks. She would like to be on a higher dose of her Adderall but has not been prescribed this. She reports that when she is on the maximum dose she would be feeling better. She reports she is not exercising because she is sometimes afraid to go outside. Review of Systems Constitutional: negative for fevers, chills, anorexia and weight loss Eyes:   negative for visual disturbance and irritation ENT:   negative for tinnitus,sore throat,nasal congestion,ear pains. hoarseness Respiratory:  negative for cough, hemoptysis, dyspnea,wheezing CV:   negative for chest pain, palpitations, lower extremity edema GI:   negative for nausea, vomiting, diarrhea, abdominal pain,melena Endo:               negative for polyuria,polydipsia,polyphagia,heat intolerance Genitourinary: negative for frequency, dysuria and hematuria Integument:  negative for rash and pruritus Hematologic:  negative for easy bruising and gum/nose bleeding Musculoskel: negative for myalgias, arthralgias, back pain, muscle weakness, joint pain Neurological:  negative for headaches, dizziness, vertigo, memory problems and gait Behavl/Psych: negative for feelings of anxiety, depression, mood changes Past Medical History:  
Diagnosis Date  Anxiety and depression Dr. Ross Sumner  
 HTN (hypertension)  Hyperlipidemia LDL goal < 130  Hypothyroid   
 dr. Magan Saldivar  Morbid obesity with BMI of 40.0-44.9, adult ([facility] [address]  Tobacco abuse Past Surgical History:  
Procedure Laterality Date  HX APPENDECTOMY  HX DILATION AND CURETTAGE Social History Social History  Marital status:  Spouse name: N/A  
 Number of children: N/A  
 Years of education: N/A Social History Main Topics  Smoking status: Current Every Day Smoker Packs/day: 1.00  Smokeless tobacco: Never Used Comment:  1 ppd/40+ years  Alcohol use No  
 Drug use: No  
   Comment: 1 pack a day  Sexual activity: No  
 
Other Topics Concern  None Social History Narrative  2 children Not employed/filing for disability Stopped working after children were born 20 yo and 13 yo No family history on file. Current Outpatient Prescriptions Medication Sig Dispense Refill  diltiazem (TIAZAC) 240 mg SR capsule TAKE 1 CAPSULE BY MOUTH EVERY DAY 90 Cap 0  
 pravastatin (PRAVACHOL) 10 mg tablet Take 1 Tab by mouth nightly.  90 Tab 3  
  venlafaxine-SR (EFFEXOR XR) 75 mg capsule Take 225 mg by mouth daily.  QUEtiapine (SEROQUEL) 25 mg tablet Take 50 mg by mouth nightly.  dextroamphetamine-amphetamine (ADDERALL) 15 mg tablet Take 1 Tab (15 mg total) by mouth two (2) times a day. Max Daily Amount: 30 mg 14 Tab 0  
 LORazepam (ATIVAN) 1 mg tablet Take 1 mg by mouth two (2) times daily as needed for Anxiety.  cholecalciferol, vitamin D3, (VITAMIN D3) 2,000 unit tab Take 2,000 Units by mouth five (5) days a week. 1 Tablet Monday - Friday. 2 Tablets Sat, Sun.    
 levothyroxine (SYNTHROID) 112 mcg tablet Take 112 mcg by mouth Daily (before breakfast).  furosemide (LASIX) 20 mg tablet Take 1 Tab by mouth daily. 30 Tab 2  
 levalbuterol tartrate (XOPENEX) 45 mcg/actuation inhaler Take 2 Puffs by inhalation every four (4) hours as needed for Wheezing or Shortness of Breath. 1 Inhaler 0 Allergies Allergen Reactions  Egg Other (comments) Abdominal pain Objective: 
Visit Vitals  /82 (BP 1 Location: Left arm, BP Patient Position: Sitting)  Pulse 83  Temp 99.2 °F (37.3 °C) (Oral)  Resp 18  Ht 5' 2\" (1.575 m)  Wt 259 lb 4 oz (117.6 kg)  LMP 07/13/2007  SpO2 95%  BMI 47.42 kg/m2 Physical Exam:  
General appearance - alert, well appearing, and in no distress, patient becomes tearful at times which does not appear to be triggered by conversation. In discussing her labs she becomes overwhelmed with abnormal values. Reassurance provided. Mental status - alert, oriented to person, place, and time EYE-ALMA, EOMI, corneas normal, no foreign bodies, visual acuity normal both eyes, no periorbital cellulitis ENT-ENT exam normal, no neck nodes or sinus tenderness Nose - normal and patent, no erythema, discharge or polyps Mouth - mucous membranes moist, pharynx normal without lesions Neck - supple, no significant adenopathy Chest - clear to auscultation, no wheezes, rales or rhonchi, symmetric air entry Heart - normal rate, regular rhythm, normal S1, S2, no murmurs, rubs, clicks or gallops Abdomen - soft, nontender, nondistended, no masses or organomegaly Lymph- no adenopathy palpable Ext-peripheral pulses normal, no pedal edema, no clubbing or cyanosis Skin-Warm and dry. no hyperpigmentation, vitiligo, or suspicious lesions and tan skin, right axilla, mild pink elliptial rash Neuro -alert, oriented, normal speech, no focal findings or movement disorder noted Neck-normal C-spine, no tenderness, full ROM without pain Results for orders placed or performed in visit on 10/11/18 AMB POC URINALYSIS DIP STICK MANUAL W/O MICRO Result Value Ref Range Color (UA POC) Yellow Clarity (UA POC) Clear Glucose (UA POC) Negative Negative Bilirubin (UA POC) Negative Negative Ketones (UA POC) Negative Negative Specific gravity (UA POC) 1.005 1.001 - 1.035 Blood (UA POC) Trace Negative pH (UA POC) 6.5 4.6 - 8.0 Protein (UA POC) Negative Negative Urobilinogen (UA POC) 0.2 mg/dL 0.2 - 1 Nitrites (UA POC) Negative Negative Leukocyte esterase (UA POC) Negative Negative Prevention Cardiovascular profile Family hx Exercising:  Limited by fibromyalgia and her agoraphobia Blood pressure: 
Health healthy diet: 
Diabetes: 
Cholesterol: 
Renal function: 
 
 
Cancer risk profile Mammogram scrreing Lung Colonoscopy 4/2019 Skin nonhealing in 2 weeks Gyn abnormal bleeding/discharge/abd pain/pressure  Follow up with dr. Yee Myra Thyroid sx Dr. Mariya Raymundo Osteopenia prevention Calcium 1000mg/day yes Vitamin D 800iu/day yes Mental health scale: following up with dr. Seretha Dakins, she is seeing counselor too Depression Anxiety Sleep # of hours: 
Energy Level:     
 
Immunizations TDAP Pneumonia vaccine Flu vaccine Shingles vaccine HPV Diagnoses and all orders for this visit: 
 
 1. Well adult exam 
-     [facility] MAMMO BI SCREENING INCL CAD; Future 
-     REFERRAL FOR COLONOSCOPY Aside from patient's well woman visit I spent 25 minutes with this patient and greater than 50% of the time was spent on other medical issues: Urinary symptoms, lower abdominal pain, cholesterol with transition to pravastatin, fatigue and desire to be on a higher dose of Adderall and hypertension management 2. Urinary urgency Patient has completed a prescription for Cipro by her urologist.  She reports she is having some early symptoms of UTI. I will write her for some Cipro to cover her until her cultures come back. -     AMB POC URINALYSIS DIP STICK MANUAL W/O MICRO 
-     CULTURE, URINE 
-     URINALYSIS W/ RFLX MICROSCOPIC 3. Lower abdominal pain  
-     CULTURE, URINE 
-     URINALYSIS W/ RFLX MICROSCOPIC 
-     ciprofloxacin HCl (CIPRO) 500 mg tablet; Take 1 Tab by mouth two (2) times a day. Do not take with seroquel. 4. Essential hypertension Stable continue medications. Her CMP appears within normal limits. 5. Encounter for hepatitis C screening test for low risk patient 
-     HEPATITIS C AB 
 
6. Immunization due -     pneumococcal 23-valent (PNEUMOVAX 23) 25 mcg/0.5 mL injection; 0.5 mL by IntraMUSCular route once for 1 dose. 7. Cervical cancer screening 8. Vaginal spotting Patient reports on exam that she has had some vaginal spotting. I looked up Dr. Leann Gonzales note and he would like to see her back if her symptoms continued. I discussed this with the patient and she will need to follow-up with Dr. Cal Ngo. -     REFERRAL TO OBSTETRICS AND GYNECOLOGY 9. Mixed hyperlipidemia Patient has been on pravastatin. We will see if we need to increase her dose from 10 mg to higher. 
-     LIPID PANEL 
-     METABOLIC PANEL, COMPREHENSIVE 10. Skin cancer screening 
-     REFERRAL TO DERMATOLOGY 11. Chronic fatigue I think this is multifactorial.  Her depression and anxiety are chronic issues and she is working with a psychiatrist on these. I will check her labs to ensure that this is not complicating her underlying mental health. -     CBC W/O DIFF 12. Plantar wart Left foot wart 
-     REFERRAL TO PODIATRY 13. Anxiety Continue on meds and follow-up with psychiatry as scheduled. Right axillary rash We will try ketoconazole for Candida 
-     ketoconazole (NIZORAL) 2 % topical cream; Apply  to affected area daily. This note will not be viewable in [address] Follow-up in 3 months or earlier if needed

## 2018-10-11 NOTE — MR AVS SNAPSHOT
[address] [facility] 
 
 
 [address] [place] [address] Road 
[phone] Patient: Vibha Hudson MRN: [id number] LXT:[birth year]10 Visit Information Date & Time Provider Department Dept. Phone Encounter #  
 10/11/2018  1:00 PM Jose Castillo MD [facility] [address] [facility] [id number] Upcoming Health Maintenance Date Due Hepatitis C Screening 1960 Pneumococcal 19-64 Medium Risk (1 of 1 - PPSV23) 1/16/1981 Shingrix Vaccine Age 50> (1 of 2) 1/16/2012 FOBT Q 1 YEAR AGE 50-75 1/16/2012 Influenza Age 5 to Adult 8/1/2018 BREAST CANCER SCRN MAMMOGRAM 10/16/2019* PAP AKA CERVICAL CYTOLOGY 2/2/2020 DTaP/Tdap/Td series (2 - Td) 9/10/2025 *Topic was postponed. The date shown is not the original due date. Allergies as of 10/11/2018  Review Complete On: 10/11/2018 By: Jose Castillo MD  
  
 Severity Noted Reaction Type Reactions Egg  06/04/2014    Other (comments) Abdominal pain Current Immunizations  Reviewed on 9/10/2015 Name Date Tdap 9/10/2015 Not reviewed this visit You Were Diagnosed With   
  
 Codes Comments Well adult exam    -  Primary ICD-10-CM: Z00.00 ICD-9-CM: V70.0 Urinary urgency     ICD-10-CM: R39.15 ICD-9-CM: 571.35 Lower abdominal pain     ICD-10-CM: R10.30 ICD-9-CM: 789.09 Essential hypertension     ICD-10-CM: I10 
ICD-9-CM: 401.9 Encounter for hepatitis C screening test for low risk patient     ICD-10-CM: Z11.59 
ICD-9-CM: V73.89 Immunization due     ICD-10-CM: Z23 ICD-9-CM: V05.9 Cervical cancer screening     ICD-10-CM: Z12.4 ICD-9-CM: V76.2 Vaginal spotting     ICD-10-CM: N93.9 ICD-9-CM: 623.8 Mixed hyperlipidemia     ICD-10-CM: E78.2 ICD-9-CM: 272.2 Skin cancer screening     ICD-10-CM: Z12.83 ICD-9-CM: V76.43  Chronic fatigue     ICD-10-CM: R53.82 
ICD-9-CM: 780.79   
 Plantar wart     ICD-10-CM: B07.0 ICD-9-CM: 078.12 Vitals BP Pulse Temp Resp Height(growth percentile) Weight(growth percentile) 141/82 (BP 1 Location: Left arm, BP Patient Position: Sitting) 83 99.2 °F (37.3 °C) (Oral) 18 5' 2\" (1.575 m) 259 lb 4 oz (117.6 kg) LMP SpO2 BMI OB Status Smoking Status 07/13/2007 95% 47.42 kg/m2 Postmenopausal Current Every Day Smoker Vitals History BMI and BSA Data Body Mass Index Body Surface Area  
 47.42 kg/m 2 2.27 m 2 Preferred Pharmacy Pharmacy Name Phone [facility]Y #[id number]- Rolando Singh, [address] [phone] Your Updated Medication List  
  
   
This list is accurate as of 10/11/18  2:08 PM.  Always use your most recent med list.  
  
  
  
  
 ciprofloxacin HCl 500 mg tablet Commonly known as:  CIPRO Take 1 Tab by mouth two (2) times a day. Do not take with seroquel. dextroamphetamine-amphetamine 15 mg tablet Commonly known as:  ADDERALL Take 1 Tab (15 mg total) by mouth two (2) times a day. Max Daily Amount: 30 mg  
  
 diltiazem 240 mg SR capsule Commonly known as:  Formerly [facility] TAKE 1 CAPSULE BY MOUTH EVERY DAY  
  
 EFFEXOR XR 75 mg capsule Generic drug:  venlafaxine-SR Take 225 mg by mouth daily. furosemide 20 mg tablet Commonly known as:  LASIX Take 1 Tab by mouth daily. ketoconazole 2 % topical cream  
Commonly known as:  NIZORAL Apply  to affected area daily. levalbuterol tartrate 45 mcg/actuation inhaler Commonly known as:  Hassie Oris Take 2 Puffs by inhalation every four (4) hours as needed for Wheezing or Shortness of Breath. LORazepam 1 mg tablet Commonly known as:  ATIVAN Take 1 mg by mouth two (2) times daily as needed for Anxiety. pneumococcal 23-valent 25 mcg/0.5 mL injection Commonly known as:  PNEUMOVAX 23  
0.5 mL by IntraMUSCular route once for 1 dose. pravastatin 10 mg tablet Commonly known as:  PRAVACHOL Take 1 Tab by mouth nightly. SEROquel 25 mg tablet Generic drug:  QUEtiapine Take 50 mg by mouth nightly. SYNTHROID 112 mcg tablet Generic drug:  levothyroxine Take 112 mcg by mouth Daily (before breakfast). VITAMIN D3 2,000 unit Tab Generic drug:  cholecalciferol (vitamin D3) Take 2,000 Units by mouth five (5) days a week. 1 Tablet Monday - Friday. 2 Tablets Sat, Sun.  
  
  
  
  
Prescriptions Printed Refills  
 pneumococcal 23-valent (PNEUMOVAX 23) 25 mcg/0.5 mL injection 0 Si.5 mL by IntraMUSCular route once for 1 dose. Class: Print Route: IntraMUSCular Prescriptions Sent to Pharmacy Refills  
 ciprofloxacin HCl (CIPRO) 500 mg tablet 0 Sig: Take 1 Tab by mouth two (2) times a day. Do not take with seroquel. Class: Normal  
 Pharmacy: [facility]/pharmacy #[address] [place] [address] Ph #: [phone] Route: Oral  
 ketoconazole (NIZORAL) 2 % topical cream 0 Sig: Apply  to affected area daily. Class: Normal  
 Pharmacy: [facility]/pharmacy #[address] [place] [address] Ph #: [phone] Route: Topical  
  
We Performed the Following AMB POC URINALYSIS DIP STICK MANUAL W/O MICRO [55589 CPT(R)] CBC W/O DIFF [02333 CPT(R)] CULTURE, URINE [id number] CPT(R)] HEPATITIS C AB [81307 CPT(R)] LIPID PANEL [44000 CPT(R)] METABOLIC PANEL, COMPREHENSIVE [85519 CPT(R)] REFERRAL FOR COLONOSCOPY [XNA947 Custom] Comments:  
 Please evaluate patient for screeing colonscopy after 2019 REFERRAL TO DERMATOLOGY [REF19 Custom] REFERRAL TO OBSTETRICS AND GYNECOLOGY [REF51 Custom] Comments: Follow up pap REFERRAL TO PODIATRY [REF90 Custom] URINALYSIS W/ RFLX MICROSCOPIC [44766 CPT(R)] To-Do List   
 10/11/2018 Imaging:  DANYELL MAMMO BI SCREENING INCL CAD Referral Information Referral ID Referred By Referred To  
  
 [id number] LAYNEJENARO, [facility] [address]  [facility] Phone: [phone] Fax: [phone] Visits Status Start Date End Date 1 New Request 10/11/18 10/11/19 If your referral has a status of pending review or denied, additional information will be sent to support the outcome of this decision. Referral ID Referred By Referred To  
 [id number] Milady Jim MD  
   [facility] [address] [facility]en [facility] Phone: [phone] Fax: [phone] Visits Status Start Date End Date 1 New Request 10/11/18 10/11/19 If your referral has a status of pending review or denied, additional information will be sent to support the outcome of this decision. Referral ID Referred By Referred To  
 [id number] Tara Le NP  
   [address] Phone: [phone] Fax: [phone] Visits Status Start Date End Date 1 New Request 10/11/18 10/11/19 If your referral has a status of pending review or denied, additional information will be sent to support the outcome of this decision. Referral ID Referred By Referred To  
 [id number] Tramaine Henson DPM  
   [place] [address] [facility], [address] [place] [address] Phone: [phone] Fax: [phone] Visits Status Start Date End Date 1 New Request 10/11/18 10/11/19 If your referral has a status of pending review or denied, additional information will be sent to support the outcome of this decision. Patient Instructions Well Visit, Women 48 to 72: Care Instructions Your Care Instructions Physical exams can help you stay healthy.  Your doctor has checked your overall health and may have suggested ways to take good care of yourself. He or she also may have recommended tests. At home, you can help prevent illness with healthy eating, regular exercise, and other steps. Follow-up care is a key part of your treatment and safety. Be sure to make and go to all appointments, and call your doctor if you are having problems. It's also a good idea to know your test results and keep a list of the medicines you take. How can you care for yourself at home? · Reach and stay at a healthy weight. This will lower your risk for many problems, such as obesity, diabetes, heart disease, and high blood pressure. · Get at least 30 minutes of exercise on most days of the week. Walking is a good choice. You also may want to do other activities, such as running, swimming, cycling, or playing tennis or team sports. · Do not smoke. Smoking can make health problems worse. If you need help quitting, talk to your doctor about stop-smoking programs and medicines. These can increase your chances of quitting for good. · Protect your skin from too much sun. When you're outdoors from 10 a.m. to 4 p.m., stay in the shade or cover up with clothing and a hat with a wide brim. Wear sunglasses that block UV rays. Even when it's cloudy, put broad-spectrum sunscreen (SPF 30 or higher) on any exposed skin. · See a dentist one or two times a year for checkups and to have your teeth cleaned. · Wear a seat belt in the car. · Limit alcohol to 1 drink a day. Too much alcohol can cause health problems. Follow your doctor's advice about when to have certain tests. These tests can spot problems early. · Cholesterol. Your doctor will tell you how often to have this done based on your age, family history, or other things that can increase your risk for heart attack and stroke. · Blood pressure.  Have your blood pressure checked during a routine doctor visit. Your doctor will tell you how often to check your blood pressure based on your age, your blood pressure results, and other factors. · Mammogram. Ask your doctor how often you should have a mammogram, which is an X-ray of your breasts. A mammogram can spot breast cancer before it can be felt and when it is easiest to treat. · Pap test and pelvic exam. Ask your doctor how often you should have a Pap test. You may not need to have a Pap test as often as you used to. · Vision. Have your eyes checked every year or two or as often as your doctor suggests. Some experts recommend that you have yearly exams for glaucoma and other age-related eye problems starting at age 48. · Hearing. Tell your doctor if you notice any change in your hearing. You can have tests to find out how well you hear. · Diabetes. Ask your doctor whether you should have tests for diabetes. · Colon cancer. You should begin tests for colon cancer at age 48. You may have one of several tests. Your doctor will tell you how often to have tests based on your age and risk. Risks include whether you already had a precancerous polyp removed from your colon or whether your parents, sisters and brothers, or children have had colon cancer. · Thyroid disease. Talk to your doctor about whether to have your thyroid checked as part of a regular physical exam. Women have an increased chance of a thyroid problem. · Osteoporosis. You should begin tests for bone density at age 72. If you are younger than 72, ask your doctor whether you have factors that may increase your risk for this disease. You may want to have this test before age 72. · Heart attack and stroke risk. At least every 4 to 6 years, you should have your risk for heart attack and stroke assessed. Your doctor uses factors such as your age, blood pressure, cholesterol, and whether you smoke or have diabetes to show what your risk for a heart attack or stroke is over the next 10 years. When should you call for help? Watch closely for changes in your health, and be sure to contact your doctor if you have any problems or symptoms that concern you. Where can you learn more? Go to http://lavelle-rhys.info/. Enter X985 in the search box to learn more about \"Well Visit, Women 50 to 72: Care Instructions. \" Current as of: March 29, 2018 Content Version: 11.8 © 1869-8369 [employer]. Care instructions adapted under license by [employer] (which disclaims liability or warranty for this information). If you have questions about a medical condition or this instruction, always ask your healthcare professional. [address] any warranty or liability for your use of this information. Introducing [facility]! Deasofia Lobo: Thank you for requesting a [employer] account. Our records indicate that you already have an active [employer] account. You can access your account anytime at [email or web address] Did you know that you can access your hospital and ER discharge instructions at any time in [employer]? You can also review all of your test results from your hospital stay or ER visit. Additional Information If you have questions, please visit the Frequently Asked Questions section of the [employer] website at [email or web address] [email or web address] Remember, [employer] is NOT to be used for urgent needs. For medical emergencies, dial 911. Now available from your iPhone and Android! Please provide this summary of care documentation to your next provider. Your primary care clinician is listed as Marisela Brito. If you have any questions after today's visit, please call [phone].

## 2018-10-11 NOTE — PATIENT INSTRUCTIONS
Well Visit, Women 48 to 72: Care Instructions Your Care Instructions Physical exams can help you stay healthy. Your doctor has checked your overall health and may have suggested ways to take good care of yourself. He or she also may have recommended tests. At home, you can help prevent illness with healthy eating, regular exercise, and other steps. Follow-up care is a key part of your treatment and safety. Be sure to make and go to all appointments, and call your doctor if you are having problems. It's also a good idea to know your test results and keep a list of the medicines you take. How can you care for yourself at home? · Reach and stay at a healthy weight. This will lower your risk for many problems, such as obesity, diabetes, heart disease, and high blood pressure. · Get at least 30 minutes of exercise on most days of the week. Walking is a good choice. You also may want to do other activities, such as running, swimming, cycling, or playing tennis or team sports. · Do not smoke. Smoking can make health problems worse. If you need help quitting, talk to your doctor about stop-smoking programs and medicines. These can increase your chances of quitting for good. · Protect your skin from too much sun. When you're outdoors from 10 a.m. to 4 p.m., stay in the shade or cover up with clothing and a hat with a wide brim. Wear sunglasses that block UV rays. Even when it's cloudy, put broad-spectrum sunscreen (SPF 30 or higher) on any exposed skin. · See a dentist one or two times a year for checkups and to have your teeth cleaned. · Wear a seat belt in the car. · Limit alcohol to 1 drink a day. Too much alcohol can cause health problems. Follow your doctor's advice about when to have certain tests. These tests can spot problems early. · Cholesterol.  Your doctor will tell you how often to have this done based on your age, family history, or other things that can increase your risk for heart attack and stroke. · Blood pressure. Have your blood pressure checked during a routine doctor visit. Your doctor will tell you how often to check your blood pressure based on your age, your blood pressure results, and other factors. · Mammogram. Ask your doctor how often you should have a mammogram, which is an X-ray of your breasts. A mammogram can spot breast cancer before it can be felt and when it is easiest to treat. · Pap test and pelvic exam. Ask your doctor how often you should have a Pap test. You may not need to have a Pap test as often as you used to. · Vision. Have your eyes checked every year or two or as often as your doctor suggests. Some experts recommend that you have yearly exams for glaucoma and other age-related eye problems starting at age 48. · Hearing. Tell your doctor if you notice any change in your hearing. You can have tests to find out how well you hear. · Diabetes. Ask your doctor whether you should have tests for diabetes. · Colon cancer. You should begin tests for colon cancer at age 48. You may have one of several tests. Your doctor will tell you how often to have tests based on your age and risk. Risks include whether you already had a precancerous polyp removed from your colon or whether your parents, sisters and brothers, or children have had colon cancer. · Thyroid disease. Talk to your doctor about whether to have your thyroid checked as part of a regular physical exam. Women have an increased chance of a thyroid problem. · Osteoporosis. You should begin tests for bone density at age 72. If you are younger than 72, ask your doctor whether you have factors that may increase your risk for this disease. You may want to have this test before age 72. · Heart attack and stroke risk. At least every 4 to 6 years, you should have your risk for heart attack and stroke assessed.  Your doctor uses factors such as your age, blood pressure, cholesterol, and whether you smoke or have diabetes to show what your risk for a heart attack or stroke is over the next 10 years. When should you call for help? Watch closely for changes in your health, and be sure to contact your doctor if you have any problems or symptoms that concern you. Where can you learn more? Go to http://lavelle-rhys.info/. Enter E953 in the search box to learn more about \"Well Visit, Women 50 to 72: Care Instructions. \" Current as of: March 29, 2018 Content Version: 11.8 © 2758-2282 [employer]. Care instructions adapted under license by [employer] (which disclaims liability or warranty for this information). If you have questions about a medical condition or this instruction, always ask your healthcare professional. [address] any warranty or liability for your use of this information.

## 2018-10-12 LAB
ALBUMIN SERPL-MCNC: 4.3 G/DL (ref 3.5–5.5)
ALBUMIN/GLOB SERPL: 1.6 {RATIO} (ref 1.2–2.2)
ALP SERPL-CCNC: 124 IU/L (ref 39–117)
ALT SERPL-CCNC: 10 IU/L (ref 0–32)
AST SERPL-CCNC: 16 IU/L (ref 0–40)
BILIRUB SERPL-MCNC: 0.4 MG/DL (ref 0–1.2)
BUN SERPL-MCNC: 12 MG/DL (ref 6–24)
BUN/CREAT SERPL: 12 (ref 9–23)
CALCIUM SERPL-MCNC: 8.8 MG/DL (ref 8.7–10.2)
CHLORIDE SERPL-SCNC: 100 MMOL/L (ref 96–106)
CHOLEST SERPL-MCNC: 185 MG/DL (ref 100–199)
CO2 SERPL-SCNC: 24 MMOL/L (ref 20–29)
CREAT SERPL-MCNC: 1.01 MG/DL (ref 0.57–1)
ERYTHROCYTE [DISTWIDTH] IN BLOOD BY AUTOMATED COUNT: 14.2 % (ref 12.3–15.4)
GLOBULIN SER CALC-MCNC: 2.7 G/DL (ref 1.5–4.5)
GLUCOSE SERPL-MCNC: 79 MG/DL (ref 65–99)
HCT VFR BLD AUTO: 43.6 % (ref 34–46.6)
HCV AB S/CO SERPL IA: <0.1 S/CO RATIO (ref 0–0.9)
HDLC SERPL-MCNC: 40 MG/DL
HGB BLD-MCNC: 15 G/DL (ref 11.1–15.9)
INTERPRETATION, 910389: NORMAL
LDLC SERPL CALC-MCNC: 122 MG/DL (ref 0–99)
MCH RBC QN AUTO: 31.1 PG (ref 26.6–33)
MCHC RBC AUTO-ENTMCNC: 34.4 G/DL (ref 31.5–35.7)
MCV RBC AUTO: 90 FL (ref 79–97)
PLATELET # BLD AUTO: 220 X10E3/UL (ref 150–379)
POTASSIUM SERPL-SCNC: 4.3 MMOL/L (ref 3.5–5.2)
PROT SERPL-MCNC: 7 G/DL (ref 6–8.5)
RBC # BLD AUTO: 4.83 X10E6/UL (ref 3.77–5.28)
SODIUM SERPL-SCNC: 138 MMOL/L (ref 134–144)
TRIGL SERPL-MCNC: 114 MG/DL (ref 0–149)
VLDLC SERPL CALC-MCNC: 23 MG/DL (ref 5–40)
WBC # BLD AUTO: 8.1 X10E3/UL (ref 3.4–10.8)

## 2018-10-17 ENCOUNTER — TELEPHONE (OUTPATIENT)
Dept: OBGYN CLINIC | Age: 58
End: 2018-10-17

## 2018-10-17 NOTE — TELEPHONE ENCOUNTER
Call received at 311PM    66year old patient last seen in the office on 2/17/17., Pap done at that visit. Patient recently went to her PCP on 10/11/18 and is being referred back to see you for vaginal bleeding, pelvic pain      Patient has history of recurrent uti and sees a urologist.         Patient has not see GI and did not have the endometrial biopsy recommended. Please advised to how to proceed with type of appointment      ? Ultrasound and follow up     ? Problem visit only    ?  Procedure      Please advise

## 2018-10-17 NOTE — TELEPHONE ENCOUNTER
She needs an US and an endometrial biopsy.  She should take 600 mg of ibuprofen an hour before her visit

## 2018-10-17 NOTE — TELEPHONE ENCOUNTER
Patient advised of MD recommendations and was placed on the schedule to have ultrasound at 2:45PM and biopsy at 3:20PM( ok per Dr. Adelaide Valentin)    Patient verbalized understanding regarding instructions prior to the procedure.

## 2018-10-22 ENCOUNTER — OFFICE VISIT (OUTPATIENT)
Dept: OBGYN CLINIC | Age: 58
End: 2018-10-22

## 2018-10-22 DIAGNOSIS — N95.0 POSTMENOPAUSAL BLEEDING: Primary | ICD-10-CM

## 2018-10-22 NOTE — PROGRESS NOTES
Postmenopausal bleeding note      Arnulfo Hidalgo is a 66 y.o. female who complains of vaginal bleeding after menopause. She became menopausal approximately several years ago. She has had vaginal bleeding which she describes as light. She has a h/o renal stones and is not positive whether the blood is from her urine or vagina. She had a similar problem last year and US was normal.    Associated symptoms include none. Alleviating factors: none    Aggravating factors: none      The patient is sexually active. Last Pap smear:was normal.    US today - UTERUS IS ANTEVERTED, NORMAL IN SIZE AND ECHOGENICITY. ENDOMETRIUM MEASURES 3-4MM IN THICKNESS. NO EVIDENCE OF MASS OR ABNORMALITY SEEN  WITHIN THE ENDOMETRIAL CAVITY. RIGHT OVARY APPEARS WITHIN NORMAL LIMITS. LEFT OVARY APPEARS WITHIN NORMAL LIMITS. NO FREE FLUID SEEN IN THE CDS. Her relevant past medical history:   Past Medical History:   Diagnosis Date    Anxiety and depression     Dr. Paresh Saucedo    HTN (hypertension)     Hyperlipidemia LDL goal < 130     Hypothyroid     dr. Keren Gotti obesity with BMI of 40.0-44.9, adult ([facility] [address]     Tobacco abuse         Past Surgical History:   Procedure Laterality Date    HX APPENDECTOMY      HX DILATION AND CURETTAGE       Social History     Occupational History    Not on file   Tobacco Use    Smoking status: Current Every Day Smoker     Packs/day: 1.00    Smokeless tobacco: Never Used    Tobacco comment:  1 ppd/40+ years   Substance and Sexual Activity    Alcohol use: No    Drug use: No     Comment: 1 pack a day    Sexual activity: No     No family history on file. Allergies   Allergen Reactions    Egg Other (comments)     Abdominal pain     Prior to Admission medications    Medication Sig Start Date End Date Taking? Authorizing Provider   ciprofloxacin HCl (CIPRO) 500 mg tablet Take 1 Tab by mouth two (2) times a day. Do not take with seroquel.  10/11/18 Nellie Su MD   ketoconazole (NIZORAL) 2 % topical cream Apply  to affected area daily. 10/11/18   Nellie Su MD   diltiazem [facility]) 240 mg SR capsule TAKE 1 CAPSULE BY MOUTH EVERY DAY 9/18/18   Nellie Su MD   pravastatin (PRAVACHOL) 10 mg tablet Take 1 Tab by mouth nightly. 6/27/18   Nellie Su MD   furosemide (LASIX) 20 mg tablet Take 1 Tab by mouth daily. 6/27/18   Nellie Su MD   levalbuterol tartrate Darol [facility]) 45 mcg/actuation inhaler Take 2 Puffs by inhalation every four (4) hours as needed for Wheezing or Shortness of Breath. 1/24/18   Dahiana Fontaine MD   venlafaxine-SR (EFFEXOR XR) 75 mg capsule Take 225 mg by mouth daily. Provider, Historical   QUEtiapine (SEROQUEL) 25 mg tablet Take 50 mg by mouth nightly. Provider, Historical   dextroamphetamine-amphetamine (ADDERALL) 15 mg tablet Take 1 Tab (15 mg total) by mouth two (2) times a day. Max Daily Amount: 30 mg 10/6/17   Rodrigo Roman MD   LORazepam (ATIVAN) 1 mg tablet Take 1 mg by mouth two (2) times daily as needed for Anxiety. Provider, Historical   cholecalciferol, vitamin D3, (VITAMIN D3) 2,000 unit tab Take 2,000 Units by mouth five (5) days a week. 1 Tablet Monday - Friday. 2 Tablets Sat, Sun.    Provider, Historical   levothyroxine (SYNTHROID) 112 mcg tablet Take 112 mcg by mouth Daily (before breakfast).     Provider, Historical        Review of Systems - History obtained from the patient  Constitutional: negative for weight loss, fever, night sweats  HEENT: negative for hearing loss, earache, congestion, snoring, sorethroat  CV: negative for chest pain, palpitations, edema  Resp: negative for cough, shortness of breath, wheezing  Breast: negative for breast lumps, nipple discharge, galactorrhea  GI: negative for change in bowel habits, abdominal pain, black or bloody stools  : negative for frequency, dysuria, hematuria  MSK: negative for back pain, joint pain, muscle pain  Skin: negative for itching, rash, hives  Neuro: negative for dizziness, headache, confusion, weakness  Psych: negative for anxiety, depression, change in mood  Heme/lymph: negative for bleeding, bruising, pallor      Objective:  Visit Vitals  LMP 07/13/2007       Physical Exam:   PHYSICAL EXAMINATION    Constitutional  · Appearance: well-nourished, well developed, alert, in no acute distress    Gastrointestinal  · Abdominal Examination: abdomen non-tender to palpation, normal bowel sounds, no masses present  · Liver and spleen: no hepatomegaly present, spleen not palpable  · Hernias: no hernias identified    Genitourinary  · External Genitalia: normal appearance for age, no discharge present, no tenderness present, no inflammatory lesions present, no masses present, no atrophy present  · Vagina: normal vaginal vault without central or paravaginal defects, no discharge present, no inflammatory lesions present, no masses present  · Bladder: non-tender to palpation  · Urethra: appears normal  · Cervix: normal   · Uterus: normal size, shape and consistency  · Adnexa: no adnexal tenderness present, no adnexal masses present  · Perineum: perineum within normal limits, no evidence of trauma, no rashes or skin lesions present  · Anus: anus within normal limits, no hemorrhoids present  · Inguinal Lymph Nodes: no lymphadenopathy present    Skin  · General Inspection: no rash, no lesions identified    Neurologic/Psychiatric  · Mental Status:  · Orientation: grossly oriented to person, place and time  · Mood and Affect: mood normal, affect appropriate    Assessment:   Postmenopausal bleeding    Plan:   Due to recurrent bleeding she will have an endometrial biopsy today. Instructions given to pt. Handouts given to pt.               [facility]  OFFICE PROCEDURE PROGRESS NOTE        Chart reviewed for the following:   Audrey CARRASCO, have reviewed the History, Physical and updated the Allergic reactions for Monico Drake     TIME OUT performed immediately prior to start of procedure:   I, Ricky Sanders, have performed the following reviews on Monico Drake prior to the start of the procedure:            * Patient was identified by name and date of birth   * Agreement on procedure being performed was verified  * Risks and Benefits explained to the patient  * Consent was signed and verified     Time: 430 pm    Date of procedure: 10/22/2018    Procedure performed by: Fatou Elaine MD    Provider assisted by: Ck Rodriguez MA    Patient assisted by: self    How tolerated by patient: tolerated the procedure well with no complications    Post Procedural Pain Scale: 2 - Hurts Little Bit    Comments: none      Procedure note: Endometrial biopsy    Monico Drake is a ,  66 y.o. female [facility] Patient's last menstrual period was 2007. The patient has a history of There were no encounter diagnoses. and presents for an endometrial biopsy. Indications:   After the indications, risks, benefits, and alternatives to performing an endometrial biopsy were explained to the patient, her questions were answered and informed consent was obtained. Procedure: The patient was placed on the table in the dorsal lithotomy position. A bimanual exam showed the uterus to be anterior. The uterus was not enlarged. A speculum was placed in the vagina. The cervix was visualized and prepped with zephrin. A tenaculum was  placed on the anterior lip of the cervix for traction. It was not necessary to dilate the cervix. A pipelle was passed through the endocervical canal without difficulty. The uterus was sounded to 8 cm's. A small amount of tissue was returned. This tissue was placed in formalin and sent to pathology. It was felt that an adequate sample was obtained. The patient tolerated the procedure well and she reported mild cramping. The tenaculum and speculum were removed. Post Procedural Status: The patient was observed for 10 minutes after the procedure. She had mild cramping at the time of discharge. There were no complications. The patient was discharged in stable condition. If the biopsy is normal then she will observe.  Advised to use a tampon to see if the bleeding is vaginal.

## 2018-10-23 ENCOUNTER — HOSPITAL ENCOUNTER (OUTPATIENT)
Dept: MAMMOGRAPHY | Age: 58
Discharge: HOME | End: 2018-10-23
Attending: INTERNAL MEDICINE
Payer: COMMERCIAL

## 2018-10-23 DIAGNOSIS — Z00.00 WELL ADULT EXAM: ICD-10-CM

## 2018-10-23 PROCEDURE — 77067 SCR MAMMO BI INCL CAD: CPT

## 2018-10-29 ENCOUNTER — TELEPHONE (OUTPATIENT)
Dept: OBGYN CLINIC | Age: 58
End: 2018-10-29

## 2018-10-29 NOTE — TELEPHONE ENCOUNTER
Patient has been advised of delay of results, verbalized understanding. She had the test done due to vaginal bleeding and frequent UTI/kidney stones. She had bleeding after the biopsy and she used a tampon to collect the blood like  advised. She had a little remaining blood the remainder of the week. The tampon hurt severely to place it. Today she is having bright red fresh blood on tampon. Not enough of blood to soak. Only soaked half the tip of tampon. She wants to know if that is normal?  She is especially concerned with the severity of pain.

## 2018-10-29 NOTE — TELEPHONE ENCOUNTER
Patient is looking for endometrial biopsy results. If not calling back on 10/29/18, may use cell phone.

## 2018-10-31 LAB
DX ICD CODE: NORMAL
DX ICD CODE: NORMAL
PATH REPORT.FINAL DX SPEC: NORMAL
PATH REPORT.GROSS SPEC: NORMAL
PATH REPORT.SITE OF ORIGIN SPEC: NORMAL
PATHOLOGIST NAME: NORMAL
PAYMENT PROCEDURE: NORMAL

## 2018-11-02 NOTE — TELEPHONE ENCOUNTER
11/2/18       9:16 am            Left message for patient to call, no emergency. No information left.

## 2018-11-07 NOTE — TELEPHONE ENCOUNTER
This nurse attempted to reach the patient and left a message for the patient to call the office back

## 2018-11-08 NOTE — TELEPHONE ENCOUNTER
Patient advised of MD reviewed labs and recommendations and verbalized understanding. Patient advised to be seen again in 2/19 unless she is having problems. Patient reports the vaginal bleeding has stopped since the procedure.

## 2018-11-30 NOTE — TELEPHONE ENCOUNTER
Online Visit    Date/Time: 10/23/2018 10:25:14 AM  To: JHONYARI  From: HULESCH, WILLIAM  Subject:    As discussed you do not have gonorrhea but she did have chlamydia a prescription has been sent in for you to take advised continue use of condoms in the future, advised informing your partner of this diagnosis so she can also be treated.  Please call with any questions    Verified Results  CHLAMYDIA / GC BY NUCLEIC ACID AMPLIFICATION 20Oct2018 10:30AM HULESCH, WILLIAM     Test Name Result Flag Reference   CHLAMYDIA TRACHOMATIS BY NUCLEIC ACID AMPLIFICATION POSITIVE A NEGATIVE   Positive results are reported to the [facility] Department of Public Health.  The Aptima Combo 2 Assay is not intended for the evaluation of suspected sexual abuse or for other medico-legal indications, nor has it been evaluated in adolescents less than 14 years of age.   In these scenarios, and in clinical settings where the prevalence of infection is low, confirmatory testing on positive results is recommended.     The Aptima Combo 2 Assay is not FDA approved for testing on throat, rectal and female urine specimens. [employer] has internally validated these specimen sources. The expected normal reference range is negative.   NEISSERIA GONORRHOEAE BY NUCLEIC ACID AMPLIFICATION NEGATIVE  NEGATIVE   Positive results are reported to the [facility] Department of Public Health.  The Aptima Combo 2 Assay is not intended for the evaluation of suspected sexual abuse or for other medico-legal indications, nor has it been evaluated in adolescents less than 14 years of age.   In these scenarios, and in clinical settings where the prevalence of infection is low, confirmatory testing on positive results is recommended.     The Aptima Combo 2 Assay is not FDA approved for testing on throat, rectal and female urine specimens. [employer] has internally validated these specimen sources. The expected normal reference range is negative.   SOURCE URINE  Patient wants to know why was her medication denied for her blood pressure medication her no is [phone]  Cell [phone] leave message on her cell phone

## 2018-12-07 ENCOUNTER — OFFICE VISIT (OUTPATIENT)
Dept: INTERNAL MEDICINE CLINIC | Age: 58
End: 2018-12-07

## 2018-12-07 ENCOUNTER — HOSPITAL ENCOUNTER (OUTPATIENT)
Dept: GENERAL RADIOLOGY | Age: 58
Discharge: HOME | End: 2018-12-07
Attending: INTERNAL MEDICINE
Payer: COMMERCIAL

## 2018-12-07 VITALS
WEIGHT: 262.4 LBS | HEIGHT: 62 IN | DIASTOLIC BLOOD PRESSURE: 85 MMHG | TEMPERATURE: 98 F | SYSTOLIC BLOOD PRESSURE: 141 MMHG | OXYGEN SATURATION: 100 % | HEART RATE: 82 BPM | BODY MASS INDEX: 48.29 KG/M2 | RESPIRATION RATE: 14 BRPM

## 2018-12-07 DIAGNOSIS — R09.81 SINUS CONGESTION: ICD-10-CM

## 2018-12-07 DIAGNOSIS — R05.9 COUGH: ICD-10-CM

## 2018-12-07 DIAGNOSIS — R30.9 URINARY PAIN: Primary | ICD-10-CM

## 2018-12-07 LAB
BILIRUB UR QL STRIP: NEGATIVE
GLUCOSE UR-MCNC: NEGATIVE MG/DL
KETONES P FAST UR STRIP-MCNC: NEGATIVE MG/DL
PH UR STRIP: 6.5 [PH] (ref 4.6–8)
PROT UR QL STRIP: NEGATIVE
SP GR UR STRIP: 1.01 (ref 1–1.03)
UA UROBILINOGEN AMB POC: NORMAL (ref 0.2–1)
URINALYSIS CLARITY POC: NORMAL
URINALYSIS COLOR POC: NORMAL
URINE BLOOD POC: NEGATIVE
URINE LEUKOCYTES POC: NEGATIVE
URINE NITRITES POC: NEGATIVE

## 2018-12-07 PROCEDURE — 71046 X-RAY EXAM CHEST 2 VIEWS: CPT

## 2018-12-07 NOTE — PROGRESS NOTES
Discussed the patient's BMI with her. The BMI follow up plan is as follows:  
 
dietary management education, guidance, and counseling 
encourage exercise 
monitor weight 
prescribed dietary intake This note will not be viewable in [address] An After Visit Summary was printed and given to the patient.

## 2018-12-07 NOTE — PROGRESS NOTES
HISTORY OF PRESENT ILLNESS Jin Segovia is a 66 y.o. female. HPI Urinary Pain: Pt reports bladder pain when bending over and mild lower abdominal discomfort when walking. Denies abdominal pain when twisting in bed or lying down. She also c/o malaise. She endorses stable bowels and appetite. She followed up for urinalysis today in clinic which didn't detect UTI. Cough: Pt confirms difficulty with deep breathing. She smokes regularly. Sinus Congestion: Pt experienced sinus HA, sinus congestion, and tinnitus in left ear couple of weeks ago. She experienced right ear pain after irrigating right ear. Review of Systems All other systems reviewed and are negative. Physical Exam  
Constitutional: She is oriented to person, place, and time. She appears well-developed and well-nourished. HENT:  
Head: Normocephalic and atraumatic. Right Ear: External ear normal.  
Left Ear: External ear normal.  
Nose: Nose normal.  
Mouth/Throat: Oropharynx is clear and moist.  
Left ear impacted with cerumen. Eyes: Conjunctivae and EOM are normal.  
Neck: Normal range of motion. Neck supple. Carotid bruit is not present. No thyroid mass and no thyromegaly present. Cardiovascular: Normal rate, regular rhythm, S1 normal, S2 normal, normal heart sounds and intact distal pulses. Pulmonary/Chest: Effort normal and breath sounds normal.  
Abdominal: Soft. Normal appearance and bowel sounds are normal. There is no hepatosplenomegaly. There is no tenderness. Musculoskeletal: Normal range of motion. Neurological: She is alert and oriented to person, place, and time. She has normal strength. No cranial nerve deficit or sensory deficit. Coordination normal.  
Skin: Skin is warm, dry and intact. No abrasion and no rash noted. Psychiatric: She has a normal mood and affect. Her behavior is normal. Judgment and thought content normal.  
Nursing note and vitals reviewed.  
 
 
ASSESSMENT and PLAN 
 Diagnoses and all orders for this visit: 
 
1. Urinary pain Stable condition. Will monitor for any changes or improvements. -     AMB POC URINALYSIS DIP STICK AUTO W/O MICRO 
-     CULTURE, URINE 2. Sinus congestion Stable, resolved condition. Will monitor for any changes or improvements. 3. Cough Pt reports difficulty with deep breathing. She will f/u for CXR, to r/o underlying etiologies. -     XR CHEST PA LAT; Future Additional Comments: Advised pt to f/u with ENT to remove cerumen from left ear. This note will not be viewable in [address] Lab results and schedule of future lab studies reviewed with patient. Reviewed diet, exercise and weight control. Written by James Chaney, as dictated by Joon Boucher MD.  
 
Current diagnosis and concerns discussed with pt at length. Understands risks and benefits or current treatment plan and medications and accepts the treatment and medication with any possible risks. Pt asks appropriate questions which were answered. Pt instructed to call with any concerns or problems.

## 2018-12-07 NOTE — PATIENT INSTRUCTIONS
Body Mass Index: Care Instructions Your Care Instructions Body mass index (BMI) can help you see if your weight is raising your risk for health problems. It uses a formula to compare how much you weigh with how tall you are. · A BMI lower than 18.5 is considered underweight. · A BMI between 18.5 and 24.9 is considered healthy. · A BMI between 25 and 29.9 is considered overweight. A BMI of 30 or higher is considered obese. If your BMI is in the normal range, it means that you have a lower risk for weight-related health problems. If your BMI is in the overweight or obese range, you may be at increased risk for weight-related health problems, such as high blood pressure, heart disease, stroke, arthritis or joint pain, and diabetes. If your BMI is in the underweight range, you may be at increased risk for health problems such as fatigue, lower protection (immunity) against illness, muscle loss, bone loss, hair loss, and hormone problems. BMI is just one measure of your risk for weight-related health problems. You may be at higher risk for health problems if you are not active, you eat an unhealthy diet, or you drink too much alcohol or use tobacco products. Follow-up care is a key part of your treatment and safety. Be sure to make and go to all appointments, and call your doctor if you are having problems. It's also a good idea to know your test results and keep a list of the medicines you take. How can you care for yourself at home? · Practice healthy eating habits. This includes eating plenty of fruits, vegetables, whole grains, lean protein, and low-fat dairy. · If your doctor recommends it, get more exercise. Walking is a good choice. Bit by bit, increase the amount you walk every day. Try for at least 30 minutes on most days of the week. · Do not smoke. Smoking can increase your risk for health problems.  If you need help quitting, talk to your doctor about stop-smoking programs and medicines. These can increase your chances of quitting for good. · Limit alcohol to 2 drinks a day for men and 1 drink a day for women. Too much alcohol can cause health problems. If you have a BMI higher than 25 · Your doctor may do other tests to check your risk for weight-related health problems. This may include measuring the distance around your waist. A waist measurement of more than 40 inches in men or 35 inches in women can increase the risk of weight-related health problems. · Talk with your doctor about steps you can take to stay healthy or improve your health. You may need to make lifestyle changes to lose weight and stay healthy, such as changing your diet and getting regular exercise. If you have a BMI lower than 18.5 · Your doctor may do other tests to check your risk for health problems. · Talk with your doctor about steps you can take to stay healthy or improve your health. You may need to make lifestyle changes to gain or maintain weight and stay healthy, such as getting more healthy foods in your diet and doing exercises to build muscle. Where can you learn more? Go to http://lavelle-rhys.info/. Enter S176 in the search box to learn more about \"Body Mass Index: Care Instructions. \" Current as of: October 13, 2016 Content Version: 11.4 © 5829-1687 [employer]. Care instructions adapted under license by [employer] (which disclaims liability or warranty for this information). If you have questions about a medical condition or this instruction, always ask your healthcare professional. [address] any warranty or liability for your use of this information.

## 2018-12-12 LAB
BACTERIA UR CULT: ABNORMAL
BACTERIA UR CULT: ABNORMAL

## 2018-12-31 RX ORDER — DILTIAZEM HYDROCHLORIDE 240 MG/1
CAPSULE, EXTENDED RELEASE ORAL
Qty: 90 CAP | Refills: 0 | Status: SHIPPED | OUTPATIENT
Start: 2018-12-31 | End: 2018-12-31 | Stop reason: ALTCHOICE

## 2018-12-31 RX ORDER — DILTIAZEM HYDROCHLORIDE 240 MG/1
CAPSULE, EXTENDED RELEASE ORAL
Qty: 90 CAP | Refills: 0 | Status: SHIPPED | OUTPATIENT
Start: 2018-12-31 | End: 2019-07-01 | Stop reason: ALTCHOICE

## 2018-12-31 NOTE — TELEPHONE ENCOUNTER
[facility]olly [facility]             Pt requesting a refill on prescription: \"Diltazem\" pt blood pressure medication. [facility] pharmacy is on file [phone]. Pt best contact number is [phone].       [id number]- [place], [address] [facility] [address] [facility] [phone]

## 2019-02-22 ENCOUNTER — TELEPHONE (OUTPATIENT)
Dept: INTERNAL MEDICINE CLINIC | Age: 59
End: 2019-02-22

## 2019-02-22 NOTE — TELEPHONE ENCOUNTER
Pts mother called stating daughter has a fever and flu like sx. I advise she needs to be seen, pt states she doesn't want to go to urgent care. I advise they have [facility] come to her home for evaluation and treatment today. [facility] was called and they will come see pt today, they will call pts mother to confirm appt time.

## 2019-04-03 RX ORDER — DILTIAZEM HYDROCHLORIDE 240 MG/1
CAPSULE, EXTENDED RELEASE ORAL
Qty: 90 CAP | Refills: 0 | Status: SHIPPED | OUTPATIENT
Start: 2019-04-03 | End: 2019-07-01 | Stop reason: SDUPTHER

## 2019-05-15 ENCOUNTER — TELEPHONE (OUTPATIENT)
Dept: INTERNAL MEDICINE CLINIC | Age: 59
End: 2019-05-15

## 2019-05-15 NOTE — TELEPHONE ENCOUNTER
Patient called requesting DR. Charleen Robins take on her daughter Polly Ross as a new patient. Patient reports that DR. Charleen Roibns sees her and her other daughter and really only trust her. Patient reports that daughter got back from [place] after studying abroad 3 weeks ago & is not feeling herself. Very sluggish per patient. Patient is requesting DR. Charleen Robins see her daughter as soon as possible. Please advise PSR if DR. Charleen Robins can take daughter as a new patient anytime soon? Patient requested that DR. Charleen Robins please call her back regarding. Please call cell and leave message if unable to answer per patient.  Patient asked that her home number be tried as well. [phone]

## 2019-05-16 NOTE — TELEPHONE ENCOUNTER
Returned call, advised of other practices in [facility] that are accepting new patients and advised her to call to schedule an appt. She thanks and states she will call them.  She was thankful for the call and disconnected the line,

## 2019-05-16 NOTE — TELEPHONE ENCOUNTER
Can you please call the patient and advise her who DR. Carrillo Villalobos would recommend for her daughter. Patient requested speaking with DR. Carrillo Villalobos if she is unable to take on daughter as a new patient to discuss who she should see since everyone else in family is a current patient of hers.      Thank you

## 2019-06-30 DIAGNOSIS — E78.2 MIXED HYPERLIPIDEMIA: ICD-10-CM

## 2019-07-01 RX ORDER — DILTIAZEM HYDROCHLORIDE 240 MG/1
CAPSULE, EXTENDED RELEASE ORAL
Qty: 90 CAP | Refills: 0 | Status: SHIPPED | OUTPATIENT
Start: 2019-07-01 | End: 2019-09-25 | Stop reason: SDUPTHER

## 2019-07-01 RX ORDER — PRAVASTATIN SODIUM 10 MG/1
TABLET ORAL
Qty: 90 TAB | Refills: 3 | Status: SHIPPED | OUTPATIENT
Start: 2019-07-01 | End: 2022-02-27 | Stop reason: SDUPTHER

## 2019-11-13 ENCOUNTER — OFFICE VISIT (OUTPATIENT)
Dept: INTERNAL MEDICINE CLINIC | Age: 59
End: 2019-11-13

## 2019-11-13 ENCOUNTER — HOSPITAL ENCOUNTER (OUTPATIENT)
Dept: LAB | Age: 59
Discharge: HOME | End: 2019-11-13

## 2019-11-13 VITALS
WEIGHT: 276.8 LBS | HEART RATE: 78 BPM | SYSTOLIC BLOOD PRESSURE: 150 MMHG | OXYGEN SATURATION: 98 % | BODY MASS INDEX: 50.94 KG/M2 | TEMPERATURE: 98.2 F | DIASTOLIC BLOOD PRESSURE: 86 MMHG | HEIGHT: 62 IN | RESPIRATION RATE: 12 BRPM

## 2019-11-13 DIAGNOSIS — E78.2 MIXED HYPERLIPIDEMIA: ICD-10-CM

## 2019-11-13 DIAGNOSIS — E78.2 MIXED HYPERLIPIDEMIA: Primary | ICD-10-CM

## 2019-11-13 DIAGNOSIS — I10 ESSENTIAL HYPERTENSION: ICD-10-CM

## 2019-11-13 LAB
ALBUMIN SERPL-MCNC: 4 G/DL (ref 3.5–5)
ALBUMIN/GLOB SERPL: 1.3 {RATIO} (ref 1.1–2.2)
ALP SERPL-CCNC: 130 U/L (ref 45–117)
ALT SERPL-CCNC: 31 U/L (ref 12–78)
ANION GAP SERPL CALC-SCNC: 5 MMOL/L (ref 5–15)
AST SERPL-CCNC: 24 U/L (ref 15–37)
BILIRUB SERPL-MCNC: 0.6 MG/DL (ref 0.2–1)
BUN SERPL-MCNC: 12 MG/DL (ref 6–20)
BUN/CREAT SERPL: 13 (ref 12–20)
CALCIUM SERPL-MCNC: 9 MG/DL (ref 8.5–10.1)
CHLORIDE SERPL-SCNC: 103 MMOL/L (ref 97–108)
CHOLEST SERPL-MCNC: 196 MG/DL
CK SERPL-CCNC: 304 U/L (ref 26–192)
CO2 SERPL-SCNC: 30 MMOL/L (ref 21–32)
CREAT SERPL-MCNC: 0.91 MG/DL (ref 0.55–1.02)
GLOBULIN SER CALC-MCNC: 3.1 G/DL (ref 2–4)
GLUCOSE SERPL-MCNC: 86 MG/DL (ref 65–100)
HDLC SERPL-MCNC: 48 MG/DL
HDLC SERPL: 4.1 {RATIO} (ref 0–5)
LDLC SERPL CALC-MCNC: 128.2 MG/DL (ref 0–100)
LIPID PROFILE,FLP: ABNORMAL
MAGNESIUM SERPL-MCNC: 2.1 MG/DL (ref 1.6–2.4)
POTASSIUM SERPL-SCNC: 4.3 MMOL/L (ref 3.5–5.1)
PROT SERPL-MCNC: 7.1 G/DL (ref 6.4–8.2)
SODIUM SERPL-SCNC: 138 MMOL/L (ref 136–145)
TRIGL SERPL-MCNC: 99 MG/DL (ref ?–150)
VLDLC SERPL CALC-MCNC: 19.8 MG/DL

## 2019-11-13 RX ORDER — FUROSEMIDE 20 MG/1
20 TABLET ORAL
Qty: 36 TAB | Refills: 1
Start: 2019-11-13 | End: 2022-06-07

## 2019-11-13 NOTE — PROGRESS NOTES
Tamar Gresham is a 64 y.o. female and presents with Medication Refill; Urinary Frequency; Fever (intermittently for 2-3 weeks); Ear Fullness (left ear); Cough; Sinus Pain; and Rash (underarms- bilateral, onset 1 year)  . Patient presents after not being seen for a year. Patient presents with follow-up of issues that have been going on over the year. She reports having urinary frequency and has been followed by urology. She denies urinary or UTI symptoms today and does not feel like she needs to have labs checked. She reports cough and sinus pain. Cardiovascular Review:  The patient has hypertension and hyperlipidemia. Diet and Lifestyle: generally follows a low fat low cholesterol diet  Home BP Monitoring: is not measured at home. Pertinent ROS: taking medications as instructed, no medication side effects noted, no TIA's, no chest pain on exertion, no dyspnea on exertion, no swelling of ankles. Patient is currently taking pravastatin 10 mg. Thyroid Review:  Patient is seen for followup of hypothyroidism. Thyroid ROS: denies fatigue, weight changes, heat/cold intolerance, bowel/skin changes or CVS symptoms. Patient is being followed by Dr. Rick Carl. Anxiety/depression/ADD  Patient reports she is following with Dr. Meghan Melgar. She reports she has decreased her Adderall prescription. She notes that her psychiatrist is very strict with her. She does not have any room for play with the medication. She is interested in pursuing a new psychiatrist if she can be followed with her insurance. Right leg trauma  Patient reports injuring right leg and blister forming at prior site of infection. No symptoms now. Review of Systems  Constitutional: negative for fevers, chills, anorexia and weight loss  Eyes:   negative for visual disturbance and irritation  ENT:   negative for tinnitus,sore throat,nasal congestion,ear pains. hoarseness  Respiratory:  negative for cough, hemoptysis, dyspnea,wheezing  CV:   negative for chest pain, palpitations, lower extremity edema  GI:   negative for nausea, vomiting, diarrhea, abdominal pain,melena  Endo:               negative for polyuria,polydipsia,polyphagia,heat intolerance  Genitourinary: negative for frequency, dysuria and hematuria  Integument:  negative for rash and pruritus  Hematologic:  negative for easy bruising and gum/nose bleeding  Musculoskel: negative for myalgias, arthralgias, back pain, muscle weakness, joint pain  Neurological:  negative for headaches, dizziness, vertigo, memory problems and gait   Behavl/Psych: negative for feelings of anxiety, depression, mood changes    Past Medical History:   Diagnosis Date    Anxiety and depression     Dr. Silvio Perez    HTN (hypertension)     Hyperlipidemia LDL goal < 130     Hypothyroid     dr. Aram Roger obesity with BMI of 40.0-44.9, adult ([facility] [address]     Tobacco abuse      Past Surgical History:   Procedure Laterality Date    HX APPENDECTOMY      HX BREAST BIOPSY Left 2012    Stereotactic-Fibrocystic change usual ductal hyperplasia    HX COLONOSCOPY  06/25/2019    HX DILATION AND CURETTAGE       Social History     Socioeconomic History    Marital status:      Spouse name: Not on file    Number of children: Not on file    Years of education: Not on file    Highest education level: Not on file   Tobacco Use    Smoking status: Current Every Day Smoker     Packs/day: 1.00    Smokeless tobacco: Never Used    Tobacco comment:  1 ppd/40+ years   Substance and Sexual Activity    Alcohol use: No    Drug use: No     Comment: 1 pack a day    Sexual activity: Never   Social History Narrative            2 children        Not employed/filing for disability    Stopped working after children were born 22 yo and 13 yo     Family History   Family history unknown: Yes     Current Outpatient Medications   Medication Sig Dispense Refill    diltiazem (TIAZAC) 240 mg SR capsule TAKE 1 CAPSULE BY MOUTH EVERY DAY 90 Cap 0    pravastatin (PRAVACHOL) 10 mg tablet TAKE 1 TABLET BY MOUTH EVERY DAY AT NIGHT 90 Tab 3    venlafaxine-SR (EFFEXOR XR) 75 mg capsule Take 225 mg by mouth daily.  QUEtiapine (SEROQUEL) 25 mg tablet Take 50 mg by mouth nightly.  dextroamphetamine-amphetamine (ADDERALL) 15 mg tablet Take 1 Tab (15 mg total) by mouth two (2) times a day. Max Daily Amount: 30 mg (Patient taking differently: Take 20 mg by mouth two (2) times a day.) 14 Tab 0    LORazepam (ATIVAN) 1 mg tablet Take 1 mg by mouth two (2) times daily as needed for Anxiety.  cholecalciferol, vitamin D3, (VITAMIN D3) 2,000 unit tab Take 2,000 Units by mouth five (5) days a week. 1 Tablet Monday - Friday. 2 Tablets Sat, Sun.      levothyroxine (SYNTHROID) 112 mcg tablet Take 112 mcg by mouth Daily (before breakfast).  levalbuterol tartrate (XOPENEX) 45 mcg/actuation inhaler Take 2 Puffs by inhalation every four (4) hours as needed for Wheezing or Shortness of Breath. (Patient taking differently: Take 2 Puffs by inhalation every four (4) hours as needed for Wheezing or Shortness of Breath. Indications: not taking) 1 Inhaler 0     Allergies   Allergen Reactions    Egg Other (comments)     Abdominal pain       Objective:  Visit Vitals  /77 (BP 1 Location: Left arm, BP Patient Position: Sitting)   Pulse 78   Temp 98.2 °F (36.8 °C) (Oral)   Resp 12   Ht 5' 2\" (1.575 m)   Wt 276 lb 12.8 oz (125.6 kg)   LMP 07/13/2007   SpO2 98%   BMI 50.63 kg/m²     Physical Exam:   General appearance - alert, well appearing, and in no distress, patient becomes tearful at times which does not appear to be triggered by conversation. In discussing her labs she becomes overwhelmed with abnormal values. Reassurance provided.   Mental status - alert, oriented to person, place, and time  EYE-ALMA, EOMI, corneas normal, no foreign bodies, visual acuity normal both eyes, no periorbital cellulitis  ENT-ENT exam normal, no neck nodes or sinus tenderness  Nose - normal and patent, no erythema, discharge or polyps  Mouth - mucous membranes moist, pharynx normal without lesions  Neck - supple, no significant adenopathy   Chest - clear to auscultation, no wheezes, rales or rhonchi, symmetric air entry   Heart - normal rate, regular rhythm, normal S1, S2, no murmurs, rubs, clicks or gallops   Abdomen - soft, nontender, nondistended, no masses or organomegaly  Lymph- no adenopathy palpable  Ext-peripheral pulses normal, no pedal edema, no clubbing or cyanosis  Skin-Warm and dry.  no hyperpigmentation, vitiligo, or suspicious lesions and tan skin, right axilla, mild pink elliptial rash  Neuro -alert, oriented, normal speech, no focal findings or movement disorder noted  Neck-normal C-spine, no tenderness, full ROM without pain      Results for orders placed or performed in visit on 12/07/18   CULTURE, URINE   Result Value Ref Range    Urine Culture, Routine (A)      Enterococcus species  25,000-50,000 colony forming units per mL      Urine Culture, Routine       Viridans streptococcus group  25,000-50,000 colony forming units per mL         Susceptibility    Enterococcus  species -  (no method available)*     Ciprofloxacin ($) S Susceptible ug/mL     Levofloxacin ($) S Susceptible ug/mL     Nitrofurantoin S Susceptible ug/mL     Penicillin S Susceptible ug/mL     Tetracycline R Resistant ug/mL     Vancomycin ($) S Susceptible ug/mL     * Performed at:  [address] [place] [address] [address] [place] [address]  [id number] Director: Ethan Colón MD, Phone:  [phone]   AMB POC URINALYSIS DIP STICK AUTO W/O MICRO   Result Value Ref Range    Color (UA POC)      Clarity (UA POC)      Glucose (UA POC) Negative Negative    Bilirubin (UA POC) Negative Negative    Ketones (UA POC) Negative Negative    Specific gravity (UA POC) 1.010 1.001 - 1.035    Blood (UA POC) Negative Negative    pH (UA POC) 6.5 4.6 - 8.0    Protein (UA POC) Negative Negative    Urobilinogen (UA POC) 0.2 mg/dL 0.2 - 1    Nitrites (UA POC) Negative Negative    Leukocyte esterase (UA POC) Negative Negative     Prevention    Cardiovascular profile  Family hx  Exercising:  Limited by fibromyalgia and her agoraphobia  Blood pressure:  Health healthy diet:  Diabetes:  Cholesterol:  Renal function:      Cancer risk profile  Mammogram scrreing  Lung  Colonoscopy 4/2019  Skin nonhealing in 2 weeks  Gyn abnormal bleeding/discharge/abd pain/pressure  Follow up with dr. Jorje Zhang    Osteopenia prevention  Calcium 1000mg/day yes  Vitamin D 800iu/day yes    Mental health scale: following up with dr. Josh Jones, she is seeing counselor too  Depression  Anxiety  Sleep # of hours:  Energy Level:        Immunizations  TDAP  Pneumonia vaccine  Flu vaccine  Shingles vaccine  HPV      Diagnoses and all orders for this visit:    1. Mixed hyperlipidemia   Continue cholesterol medication    -     LIPID PANEL; Future  -     CK; Future    2. Essential hypertension  Not controlled  We will check labs  Will take Lasix 4 times per week. -     furosemide (LASIX) 20 mg tablet; Take 1 Tab by mouth every Monday, Wednesday, Friday, Saturday. -     METABOLIC PANEL, COMPREHENSIVE; Future  -     MAGNESIUM; Future    Patient does need her immunizations. I recommend flu vaccine however she has egg allergy so will try local pharmacy. Attention deficit  Continue meds and follow-up with psychiatry.     Thyroid  Follow-up with Dr. Xiomara Zhang    Follow-up in 3 months or earlier if needed

## 2020-03-16 RX ORDER — DILTIAZEM HYDROCHLORIDE 240 MG/1
CAPSULE, EXTENDED RELEASE ORAL
Qty: 90 CAP | Refills: 0 | Status: SHIPPED | OUTPATIENT
Start: 2020-03-16 | End: 2020-06-19

## 2020-08-04 ENCOUNTER — OFFICE VISIT (OUTPATIENT)
Dept: INTERNAL MEDICINE CLINIC | Age: 60
End: 2020-08-04
Payer: COMMERCIAL

## 2020-08-04 VITALS — HEIGHT: 62 IN | BODY MASS INDEX: 50.79 KG/M2 | WEIGHT: 276 LBS

## 2020-08-04 DIAGNOSIS — R19.7 DIARRHEA, UNSPECIFIED TYPE: ICD-10-CM

## 2020-08-04 DIAGNOSIS — E78.2 MIXED HYPERLIPIDEMIA: ICD-10-CM

## 2020-08-04 DIAGNOSIS — R10.30 LOWER ABDOMINAL PAIN: Primary | ICD-10-CM

## 2020-08-04 DIAGNOSIS — I10 ESSENTIAL HYPERTENSION: ICD-10-CM

## 2020-08-04 DIAGNOSIS — F43.9 SITUATIONAL STRESS: ICD-10-CM

## 2020-08-04 PROCEDURE — 99214 OFFICE O/P EST MOD 30 MIN: CPT | Performed by: INTERNAL MEDICINE

## 2020-08-04 NOTE — PROGRESS NOTES
Consent: Blaire Yung, who was seen by synchronous (real-time) audio-video technology, and/or her healthcare decision maker, is aware that this patient-initiated, Telehealth encounter on 8/4/2020 is a billable service, with coverage as determined by her insurance carrier. She is aware that she may receive a bill and has provided verbal consent to proceed: YES      712  Assessment & Plan:   Diagnoses and all orders for this visit:    1. Lower abdominal pain  Patient with persistent lower abdominal pain  History of diverticulosis noted on colonoscopy 2019  Will get CT and if diverticulitis will initiate antibiotics. Given her history of diarrhea and long term antibiotics from UTI concerning for C. difficile  -     CT PELV W WO CONT; Future  -     CT ABD W WO CONT; Future  -     CBC W/O DIFF; Future    2. Diarrhea, unspecified type  Patient with history of antibiotics  Will assess for C. difficile  -     C. DIFFICILE AG & TOXIN A/B; Future  -     CBC W/O DIFF; Future    3. Mixed hyperlipidemia  Patient is currently off cholesterol medication due to side effects and elevated CK myalgias  We will recheck levels  -     LIPID PANEL; Future  -     CK; Future    4. Essential hypertension  Continue medicine  -     METABOLIC PANEL, COMPREHENSIVE; Future    5. Situational stress  Patient concerned with diagnosis of borderline personality disorder  She would like to talk to counselor-     REFERRAL TO PSYCHIATRY              Subjective:   Blaire Yung is a 64 y.o. female who was seen for Medication Refill; Abdominal Pain (onset Jan- lower abd); Constipation; Diarrhea; and Bloated      Lower abdominal pain  Patient reports that she has been having symptoms of  Constipation diarrhea, bloating gassiness for over 7 months. June 2019, colonsopcy with DR. Lund significant for diverticulosis. She denies blood in her stool. She notes pain in the mid abdomen. She also notes incomplete evacuation.   She has history of stool incontinence. She is able to make formed stool occasionally. family denies hx of diarrhea recently    Recurrent UTIs  Patient has been followed by urology. She had another UTI in January 2020 and was on antibiotics for 10 days. She was initiated on prophylactic antibiotics for several months. She stopped her antibiotics and thought she had another UTI but was told her urine was negative for infection. She has persistent frequency. Another uti in Jan  abx x 10 days, initiated daily prophylactic abx x few months  abx stopped and thought another uti--  Was placed on abx before June, incomplete evacuation        Subjective:   Newton Kennedy is a 64 y.o. female with hypertension. Hypertension ROS: taking medications as instructed, no medication side effects noted, no TIA's, no chest pain on exertion, no dyspnea on exertion, no swelling of ankles. New concerns: None. Current Outpatient Medications   Medication Sig    dilTIAZem ER (TIAZAC) 240 mg capsule TAKE 1 CAPSULE BY MOUTH EVERY DAY    venlafaxine-SR (EFFEXOR XR) 75 mg capsule Take 225 mg by mouth daily.  QUEtiapine (SEROQUEL) 25 mg tablet Take 50 mg by mouth nightly.  dextroamphetamine-amphetamine (ADDERALL) 15 mg tablet Take 1 Tab (15 mg total) by mouth two (2) times a day. Max Daily Amount: 30 mg (Patient taking differently: Take 20 mg by mouth two (2) times a day.)    LORazepam (ATIVAN) 1 mg tablet Take 1 mg by mouth two (2) times daily as needed for Anxiety.  cholecalciferol, vitamin D3, (VITAMIN D3) 2,000 unit tab Take 2,000 Units by mouth five (5) days a week. 1 Tablet Monday - Friday. 2 Tablets Sat, Sun.    levothyroxine (SYNTHROID) 112 mcg tablet Take 112 mcg by mouth Daily (before breakfast).  ketoconazole (NIZORAL) 2 % topical cream Apply  to affected area two (2) times a day.  APPLY TO rash under axilla and breast    Nursing, document site in comments (Patient taking differently: Apply  to affected area two (2) times a day. APPLY TO rash under axilla and breast    Nursing, document site in comments  Indications: not using)    furosemide (LASIX) 20 mg tablet Take 1 Tab by mouth every Monday, Wednesday, Friday, Saturday. (Patient taking differently: Take 20 mg by mouth every Monday, Wednesday, Friday, Saturday. Indications: not taking)    pravastatin (PRAVACHOL) 10 mg tablet TAKE 1 TABLET BY MOUTH EVERY DAY AT NIGHT (Patient taking differently: Indications: not taking)    levalbuterol tartrate (XOPENEX) 45 mcg/actuation inhaler Take 2 Puffs by inhalation every four (4) hours as needed for Wheezing or Shortness of Breath. (Patient taking differently: Take 2 Puffs by inhalation every four (4) hours as needed for Wheezing or Shortness of Breath. Indications: not taking)     No current facility-administered medications for this visit.         Allergies   Allergen Reactions    Egg Other (comments)     Abdominal pain     Subjective    Past Medical History:   Diagnosis Date    Anxiety and depression     Dr. Aidan Aleman    HTN (hypertension)     Hyperlipidemia LDL goal < 130     Hypothyroid     dr. Aster Ch obesity with BMI of 40.0-44.9, adult ([facility] [address]     Tobacco abuse        ROS  All other systems reviewed and negative, unless mentioned in HPI    Objective:   Vital Signs: (As obtained by patient/caregiver at home)  Visit Vitals  Ht 5' 2\" (1.575 m)   Wt 276 lb (125.2 kg)   LMP 07/13/2007   BMI 50.48 kg/m²        [INSTRUCTIONS:  \"[x]\" Indicates a positive item  \"[]\" Indicates a negative item  -- DELETE ALL ITEMS NOT EXAMINED]    Constitutional: [x] Appears well-developed and well-nourished [x] No apparent distress      [] Abnormal -     Mental status: [x] Alert and awake  [x] Oriented to person/place/time [x] Able to follow commands    [] Abnormal -     Eyes:   EOM    [x]  Normal    [] Abnormal -   Sclera  [x]  Normal    [] Abnormal -          Discharge [x]  None visible   [] Abnormal -     HENT: [x] Normocephalic, atraumatic  [] Abnormal -   [x] Mouth/Throat: Mucous membranes are moist    External Ears [x] Normal  [] Abnormal -    Neck: [x] No visualized mass [] Abnormal -     Pulmonary/Chest: [x] Respiratory effort normal   [x] No visualized signs of difficulty breathing or respiratory distress        [] Abnormal -      Musculoskeletal:   [x] Normal gait with no signs of ataxia         [x] Normal range of motion of neck        [] Abnormal -     Neurological:        [x] No Facial Asymmetry (Cranial nerve 7 motor function) (limited exam due to video visit)          [x] No gaze palsy        [] Abnormal -          Skin:        [x] No significant exanthematous lesions or discoloration noted on facial skin         [] Abnormal -            Psychiatric:       [x] Normal Affect [] Abnormal -        [x] No Hallucinations    Other pertinent observable physical exam findings:-      We discussed the expected course, resolution and complications of the diagnosis(es) in detail. Medication risks, benefits, costs, interactions, and alternatives were discussed as indicated. I advised her to contact the office if her condition worsens, changes or fails to improve as anticipated. She expressed understanding with the diagnosis(es) and plan. Shukri Rai is a 64 y.o. female being evaluated by a video visit encounter for concerns as above. A caregiver was present when appropriate. Due to this being a TeleHealth encounter (During OCIQS-60 public health emergency), evaluation of the following organ systems was limited: Vitals/Constitutional/EENT/Resp/CV/GI//MS/Neuro/Skin/Heme-Lymph-Imm.   Pursuant to the emergency declaration under the [facility] waiver authority and the [employer] Act, this Virtual  Visit was conducted, with patient's (and/or legal guardian's) consent, to reduce the patient's risk of exposure to COVID-19 and provide necessary medical care. Services were provided through a video synchronous discussion virtually to substitute for in-person clinic visit. Patient and provider were located at their individual homes.     Cherie Esparza MD

## 2020-08-22 RX ORDER — DILTIAZEM HYDROCHLORIDE 240 MG/1
CAPSULE, EXTENDED RELEASE ORAL
Qty: 30 CAP | Refills: 0 | Status: SHIPPED | OUTPATIENT
Start: 2020-08-22 | End: 2020-09-15

## 2020-09-15 RX ORDER — DILTIAZEM HYDROCHLORIDE 240 MG/1
CAPSULE, EXTENDED RELEASE ORAL
Qty: 30 CAP | Refills: 0 | Status: SHIPPED | OUTPATIENT
Start: 2020-09-15 | End: 2020-10-08

## 2020-10-08 RX ORDER — DILTIAZEM HYDROCHLORIDE 240 MG/1
CAPSULE, EXTENDED RELEASE ORAL
Qty: 30 CAP | Refills: 0 | Status: SHIPPED | OUTPATIENT
Start: 2020-10-08 | End: 2020-10-16 | Stop reason: SDUPTHER

## 2020-10-16 RX ORDER — DILTIAZEM HYDROCHLORIDE 240 MG/1
CAPSULE, EXTENDED RELEASE ORAL
Qty: 30 CAP | Refills: 2 | Status: SHIPPED | OUTPATIENT
Start: 2020-10-16 | End: 2021-01-12

## 2021-03-05 RX ORDER — DILTIAZEM HYDROCHLORIDE 240 MG/1
CAPSULE, EXTENDED RELEASE ORAL
Qty: 30 CAP | Refills: 0 | Status: SHIPPED | OUTPATIENT
Start: 2021-03-05 | End: 2021-03-28

## 2021-04-17 RX ORDER — DILTIAZEM HYDROCHLORIDE 240 MG/1
CAPSULE, EXTENDED RELEASE ORAL
Qty: 30 CAP | Refills: 0 | Status: SHIPPED | OUTPATIENT
Start: 2021-04-17 | End: 2021-07-05

## 2021-04-20 NOTE — MR AVS SNAPSHOT
JUAN C Schreiber - Please advise pt message below. Change US to stat?   Visit Information Date & Time Provider Department Dept. Phone Encounter #  
 6/8/2017  2:45 PM Maribeth Villagomez MD [facility] [address] [id number] Upcoming Health Maintenance Date Due Hepatitis C Screening 1960 Pneumococcal 19-64 Medium Risk (1 of 1 - PPSV23) 1/16/1981 FOBT Q 1 YEAR AGE 50-75 1/16/2012 BREAST CANCER SCRN MAMMOGRAM 6/4/2016 INFLUENZA AGE 9 TO ADULT 8/1/2017 PAP AKA CERVICAL CYTOLOGY 2/2/2020 DTaP/Tdap/Td series (2 - Td) 9/10/2025 Allergies as of 6/8/2017  Review Complete On: 6/8/2017 By: Maribeth Villagomez MD  
  
 Severity Noted Reaction Type Reactions Egg  06/04/2014    Other (comments) Abdominal pain Current Immunizations  Reviewed on 9/10/2015 Name Date Tdap 9/10/2015 Not reviewed this visit You Were Diagnosed With   
  
 Codes Comments Bladder irritability    -  Primary ICD-10-CM: N32.89 ICD-9-CM: 596.89 Anxiety     ICD-10-CM: F41.9 ICD-9-CM: 300.00 Hypercholesteremia     ICD-10-CM: E78.00 ICD-9-CM: 272.0 Vitals BP Pulse Temp Resp Height(growth percentile) Weight(growth percentile) 114/68 85 99 °F (37.2 °C) (Oral) 18 5' 1\" (1.549 m) 277 lb (125.6 kg) LMP SpO2 BMI OB Status Smoking Status 07/13/2007 95% 52.34 kg/m2 Postmenopausal Current Every Day Smoker Vitals History BMI and BSA Data Body Mass Index Body Surface Area  
 52.34 kg/m 2 2.33 m 2 Preferred Pharmacy Pharmacy Name Phone [id number] [place], [address] [facility] [phone] Your Updated Medication List  
  
   
This list is accurate as of: 6/8/17  4:04 PM.  Always use your most recent med list.  
  
  
  
  
 * VITAMIN D3 2,000 unit Tab Generic drug:  cholecalciferol (vitamin D3) Take  by mouth. * Cholecalciferol (Vitamin D3) 50,000 unit Cap Take 1 Cap by mouth every seven (7) days. ciprofloxacin HCl 500 mg tablet Commonly known as:  CIPRO Take 1 Tab by mouth two (2) times a day. Do not take with celexa  
  
 citalopram 20 mg tablet Commonly known as:  CELEXA  
  
 dextroamphetamine-amphetamine 20 mg tablet Commonly known as:  ADDERALL Indications: ATTENTION-DEFICIT HYPERACTIVITY DISORDER. Take 1-2 tablet po tid. Take only as needed for add  
  
 dilTIAZem  mg ER capsule Commonly known as:  CARDIZEM CD Take 1 Cap by mouth daily. LORazepam 1 mg tablet Commonly known as:  ATIVAN Take 1 Tab by mouth every eight (8) hours as needed. QUEtiapine 25 mg tablet Commonly known as:  SEROquel Take 1 Tab by mouth nightly. simvastatin 10 mg tablet Commonly known as:  ZOCOR  
TAKE 1 TABLET BY MOUTH NIGHTLY  
  
 SYNTHROID 112 mcg tablet Generic drug:  levothyroxine Take  by mouth Daily (before breakfast). venlafaxine- mg capsule Commonly known as:  EFFEXOR-XR  
TAKE ONE CAPSULE BY MOUTH EVERY DAY  
  
 * Notice: This list has 2 medication(s) that are the same as other medications prescribed for you. Read the directions carefully, and ask your doctor or other care provider to review them with you. Prescriptions Sent to Pharmacy Refills  
 ciprofloxacin HCl (CIPRO) 500 mg tablet 0 Sig: Take 1 Tab by mouth two (2) times a day. Do not take with celexa Class: Normal  
 Pharmacy: [facility]/pharmacy [address] [place] [address] Ph #: [phone] Route: Oral  
  
We Performed the Following AMB POC URINALYSIS DIP STICK AUTO W/O MICRO [19862 CPT(R)] To-Do List   
 06/08/2017 Lab:  LIPID PANEL   
  
 06/08/2017 Lab:  METABOLIC PANEL, COMPREHENSIVE Patient Instructions Urinary Tract Infection in Women: Care Instructions Your Care Instructions A urinary tract infection, or UTI, is a general term for an infection anywhere between the kidneys and the urethra (where urine comes out). Most UTIs are bladder infections. They often cause pain or burning when you urinate. UTIs are caused by bacteria and can be cured with antibiotics. Be sure to complete your treatment so that the infection goes away. Follow-up care is a key part of your treatment and safety. Be sure to make and go to all appointments, and call your doctor if you are having problems. It's also a good idea to know your test results and keep a list of the medicines you take. How can you care for yourself at home? · Take your antibiotics as directed. Do not stop taking them just because you feel better. You need to take the full course of antibiotics. · Drink extra water and other fluids for the next day or two. This may help wash out the bacteria that are causing the infection. (If you have kidney, heart, or liver disease and have to limit fluids, talk with your doctor before you increase your fluid intake.) · Avoid drinks that are carbonated or have caffeine. They can irritate the bladder. · Urinate often. Try to empty your bladder each time. · To relieve pain, take a hot bath or lay a heating pad set on low over your lower belly or genital area. Never go to sleep with a heating pad in place. To prevent UTIs · Drink plenty of water each day. This helps you urinate often, which clears bacteria from your system. (If you have kidney, heart, or liver disease and have to limit fluids, talk with your doctor before you increase your fluid intake.) · Urinate when you need to. · Urinate right after you have sex. · Change sanitary pads often. · Avoid douches, bubble baths, feminine hygiene sprays, and other feminine hygiene products that have deodorants. · After going to the bathroom, wipe from front to back. When should you call for help? Call your doctor now or seek immediate medical care if: · Symptoms such as fever, chills, nausea, or vomiting get worse or appear for the first time. · You have new pain in your back just below your rib cage. This is called flank pain. · There is new blood or pus in your urine. · You have any problems with your antibiotic medicine. Watch closely for changes in your health, and be sure to contact your doctor if: 
· You are not getting better after taking an antibiotic for 2 days. · Your symptoms go away but then come back. Where can you learn more? Go to http://lavelle-rhys.info/. Enter M752 in the search box to learn more about \"Urinary Tract Infection in Women: Care Instructions. \" Current as of: November 28, 2016 Content Version: 11.2 © 2297-5008 [employer]. Care instructions adapted under license by [employer] (which disclaims liability or warranty for this information). If you have questions about a medical condition or this instruction, always ask your healthcare professional. [place] [address] any warranty or liability for your use of this information. Introducing [facility]! Dear Anita Ramesh: Thank you for requesting a [employer] account. Our records indicate that you already have an active [employer] account. You can access your account anytime at [email or web address] Did you know that you can access your hospital and ER discharge instructions at any time in [employer]? You can also review all of your test results from your hospital stay or ER visit. Additional Information If you have questions, please visit the Frequently Asked Questions section of the [employer] website at [email or web address] Remember, [employer] is NOT to be used for urgent needs. For medical emergencies, dial 911. Now available from your iPhone and Android! Please provide this summary of care documentation to your next provider. Your primary care clinician is listed as Rose Jones. If you have any questions after today's visit, please call [phone].

## 2021-05-13 ENCOUNTER — OFFICE VISIT (OUTPATIENT)
Dept: INTERNAL MEDICINE CLINIC | Age: 61
End: 2021-05-13
Payer: COMMERCIAL

## 2021-05-13 VITALS
OXYGEN SATURATION: 98 % | DIASTOLIC BLOOD PRESSURE: 84 MMHG | BODY MASS INDEX: 46.74 KG/M2 | HEART RATE: 81 BPM | WEIGHT: 254 LBS | RESPIRATION RATE: 14 BRPM | TEMPERATURE: 97.7 F | SYSTOLIC BLOOD PRESSURE: 145 MMHG | HEIGHT: 62 IN

## 2021-05-13 DIAGNOSIS — L98.8 SKIN MACULE: ICD-10-CM

## 2021-05-13 DIAGNOSIS — I10 ESSENTIAL HYPERTENSION: Primary | ICD-10-CM

## 2021-05-13 DIAGNOSIS — F32.89 OTHER DEPRESSION: ICD-10-CM

## 2021-05-13 DIAGNOSIS — E55.9 VITAMIN D DEFICIENCY: ICD-10-CM

## 2021-05-13 DIAGNOSIS — E53.8 LOW SERUM VITAMIN B12: ICD-10-CM

## 2021-05-13 PROCEDURE — 99214 OFFICE O/P EST MOD 30 MIN: CPT | Performed by: INTERNAL MEDICINE

## 2021-05-13 RX ORDER — CLONIDINE HYDROCHLORIDE 0.1 MG/1
0.05 TABLET ORAL 2 TIMES DAILY
Qty: 60 TAB | Refills: 0 | Status: SHIPPED | OUTPATIENT
Start: 2021-05-13 | End: 2021-07-09

## 2021-05-13 NOTE — PROGRESS NOTES
Consent: Esperanza Collins, who was seen by synchronous (real-time) audio-video technology, and/or her healthcare decision maker, is aware that this patient-initiated, Telehealth encounter on 5/13/2021 is a billable service, with coverage as determined by her insurance carrier. She is aware that she may receive a bill and has provided verbal consent to proceed: YES      712  Assessment & Plan:   Diagnoses and all orders for this visit:      1. Essential hypertension  Not optimally controlled   Psychiatrist was considering adding clonidine for anxiety control  Will add for her blood pressure and hopefully will benefit some anxiety symptoms  -     METABOLIC PANEL, COMPREHENSIVE; Future  -     cloNIDine HCL (CATAPRES) 0.1 mg tablet; Take 0.5 Tabs by mouth two (2) times a day. -     CK; Future    2. Low serum vitamin B12  Replete as needed  -     VITAMIN B12 & FOLATE; Future    3. Other depression  She is being followed by nurse practitioner Nancy Paiz  Reached out to [facility] outpt services/mailbox full  -     CBC W/O DIFF; Future    4. Vitamin D deficiency  Replete as needed  -     VITAMIN D, 25 HYDROXY; Future    5. Body mass index (BMI) 50.0-59.9, adult [facility])  She is being followed by endocrinology for her thyroid    6. Skin macule  -     REFERRAL TO DERMATOLOGY      Follow up 1 month  Called [facility] behavioral services re: pt but mailbox full  attempted  reach out to nurse practitioner to let him know that I started clonidine and needs reassessment with regards to anxiety. She may need to take 1-1/2 tabs of lorazepam to slow wean. Subjective:   Esperanza Collins is a 63 y.o. female who was seen for Follow-up (and labs )    Anxiety  Pt is seeing NP, Deanna Bush  she reports her lorazepam was decreased from 2 tablets to 1 tablet daily. She is taking hydroxyzine but this medication seems to make her oversedated and very groggy. She reports lots of situational stress.   Her daughter, Prince Aguila, will be getting  in July. Anxiety  1/2 ativan helps her with restless legs      Subjective:   Victorino Pierce is a 63 y.o. female with hypertension. Hypertension ROS: taking medications as instructed, no medication side effects noted, no TIA's, no chest pain on exertion, no dyspnea on exertion, no swelling of ankles. New concerns: None. Current Outpatient Medications   Medication Sig    dilTIAZem ER (Tiadylt ER) 240 mg capsule TAKE 1 CAPSULE BY MOUTH EVERY DAY    venlafaxine-SR (EFFEXOR XR) 75 mg capsule Take 225 mg by mouth daily.  QUEtiapine (SEROQUEL) 25 mg tablet Take 50 mg by mouth nightly.  dextroamphetamine-amphetamine (ADDERALL) 15 mg tablet Take 1 Tab (15 mg total) by mouth two (2) times a day. Max Daily Amount: 30 mg (Patient taking differently: Take 20 mg by mouth two (2) times a day.)    LORazepam (ATIVAN) 1 mg tablet Take 1 mg by mouth two (2) times daily as needed for Anxiety.  cholecalciferol, vitamin D3, (VITAMIN D3) 2,000 unit tab Take 2,000 Units by mouth five (5) days a week. 1 Tablet Monday - Friday. 2 Tablets Sat, Sun.    levothyroxine (SYNTHROID) 112 mcg tablet Take 112 mcg by mouth Daily (before breakfast). 125mcg    ketoconazole (NIZORAL) 2 % topical cream Apply  to affected area two (2) times a day. APPLY TO rash under axilla and breast    Nursing, document site in comments (Patient taking differently: Apply  to affected area two (2) times a day. APPLY TO rash under axilla and breast    Nursing, document site in comments  Indications: not using)    furosemide (LASIX) 20 mg tablet Take 1 Tab by mouth every Monday, Wednesday, Friday, Saturday. (Patient taking differently: Take 20 mg by mouth every Monday, Wednesday, Friday, Saturday.  Indications: not taking)    pravastatin (PRAVACHOL) 10 mg tablet TAKE 1 TABLET BY MOUTH EVERY DAY AT NIGHT (Patient taking differently: Indications: not taking)    levalbuterol tartrate (XOPENEX) 45 mcg/actuation inhaler Take 2 Puffs by inhalation every four (4) hours as needed for Wheezing or Shortness of Breath. (Patient taking differently: Take 2 Puffs by inhalation every four (4) hours as needed for Wheezing or Shortness of Breath. Indications: not taking)     No current facility-administered medications for this visit. Allergies   Allergen Reactions    Egg Other (comments)     Abdominal pain     Subjective    Past Medical History:   Diagnosis Date    Anxiety and depression     Dr. Claire Martinez    HTN (hypertension)     Hyperlipidemia LDL goal < 130     Hypothyroid     dr. Vale Hatch obesity with BMI of 40.0-44.9, adult ([facility] [address]     Tobacco abuse        ROS  All other systems reviewed and negative, unless mentioned in HPI    Objective:   Vital Signs: (As obtained by patient/caregiver at home)  Visit Vitals  BP (!) 145/84 (BP 1 Location: Left upper arm, BP Patient Position: Sitting, BP Cuff Size: Adult)   Pulse 81   Temp 97.7 °F (36.5 °C) (Oral)   Resp 14   Ht 5' 2\" (1.575 m)   Wt 254 lb (115.2 kg)   LMP 07/13/2007   SpO2 98%   BMI 46.46 kg/m²        [INSTRUCTIONS:  \"[x]\" Indicates a positive item  \"[]\" Indicates a negative item  -- DELETE ALL ITEMS NOT EXAMINED]    Constitutional: [x] Appears well-developed and well-nourished [x] No apparent distress      [] Abnormal -     Mental status: [x] Alert and awake  [x] Oriented to person/place/time [x] Able to follow commands tearful on exam but then corrects himself.   Notable for low self-esteem and confidence  [] Abnormal -     Eyes:   EOM    [x]  Normal    [] Abnormal -   Sclera  [x]  Normal    [] Abnormal -          Discharge [x]  None visible   [] Abnormal -     HENT: [x] Normocephalic, atraumatic  [] Abnormal -   [x] Mouth/Throat: Mucous membranes are moist    External Ears [x] Normal  [] Abnormal -    Neck: [x] No visualized mass [] Abnormal -     Pulmonary/Chest: [x] Respiratory effort normal   [x] No visualized signs of difficulty breathing or respiratory distress        [] Abnormal -      Musculoskeletal:   [x] Normal gait with no signs of ataxia         [x] Normal range of motion of neck        [] Abnormal -     Neurological:        [x] No Facial Asymmetry (Cranial nerve 7 motor function) (limited exam due to video visit)          [x] No gaze palsy        [] Abnormal -          Skin:        [x] No significant exanthematous lesions or discoloration noted on facial skin         [] Abnormal -            Psychiatric:       [x] Normal Affect [] Abnormal -        [x] No Hallucinations    Other pertinent observable physical exam findings:-    Elle Roper MD

## 2021-05-13 NOTE — Clinical Note
Reach out to [employer]   Started clonidine. Patient may need to take lorazepam 1-1/2 tablets rather than from 2-1 given increased situational stress in her life right now.

## 2021-05-14 LAB
25(OH)D3 SERPL-MCNC: 35.7 NG/ML (ref 30–100)
ALBUMIN SERPL-MCNC: 3.9 G/DL (ref 3.5–5)
ALBUMIN/GLOB SERPL: 1.2 {RATIO} (ref 1.1–2.2)
ALP SERPL-CCNC: 154 U/L (ref 45–117)
ALT SERPL-CCNC: 27 U/L (ref 12–78)
ANION GAP SERPL CALC-SCNC: 4 MMOL/L (ref 5–15)
AST SERPL-CCNC: 26 U/L (ref 15–37)
BILIRUB SERPL-MCNC: 0.4 MG/DL (ref 0.2–1)
BUN SERPL-MCNC: 14 MG/DL (ref 6–20)
BUN/CREAT SERPL: 17 (ref 12–20)
CALCIUM SERPL-MCNC: 8.8 MG/DL (ref 8.5–10.1)
CHLORIDE SERPL-SCNC: 102 MMOL/L (ref 97–108)
CK SERPL-CCNC: 302 U/L (ref 26–192)
CO2 SERPL-SCNC: 29 MMOL/L (ref 21–32)
CREAT SERPL-MCNC: 0.84 MG/DL (ref 0.55–1.02)
ERYTHROCYTE [DISTWIDTH] IN BLOOD BY AUTOMATED COUNT: 12.9 % (ref 11.5–14.5)
FOLATE SERPL-MCNC: 8.1 NG/ML (ref 5–21)
GLOBULIN SER CALC-MCNC: 3.2 G/DL (ref 2–4)
GLUCOSE SERPL-MCNC: 79 MG/DL (ref 65–100)
HCT VFR BLD AUTO: 45.5 % (ref 35–47)
HGB BLD-MCNC: 15.4 G/DL (ref 11.5–16)
MCH RBC QN AUTO: 33 PG (ref 26–34)
MCHC RBC AUTO-ENTMCNC: 33.8 G/DL (ref 30–36.5)
MCV RBC AUTO: 97.4 FL (ref 80–99)
NRBC # BLD: 0 K/UL (ref 0–0.01)
NRBC BLD-RTO: 0 PER 100 WBC
PLATELET # BLD AUTO: 208 K/UL (ref 150–400)
PMV BLD AUTO: 10.6 FL (ref 8.9–12.9)
POTASSIUM SERPL-SCNC: 4.4 MMOL/L (ref 3.5–5.1)
PROT SERPL-MCNC: 7.1 G/DL (ref 6.4–8.2)
RBC # BLD AUTO: 4.67 M/UL (ref 3.8–5.2)
SODIUM SERPL-SCNC: 135 MMOL/L (ref 136–145)
VIT B12 SERPL-MCNC: 232 PG/ML (ref 193–986)
WBC # BLD AUTO: 9 K/UL (ref 3.6–11)

## 2021-05-15 RX ORDER — LANOLIN ALCOHOL/MO/W.PET/CERES
1000 CREAM (GRAM) TOPICAL DAILY
Qty: 90 TAB | Refills: 0 | Status: SHIPPED | OUTPATIENT
Start: 2021-05-15 | End: 2021-06-28

## 2021-05-18 ENCOUNTER — TELEPHONE (OUTPATIENT)
Dept: INTERNAL MEDICINE CLINIC | Age: 61
End: 2021-05-18

## 2021-05-18 NOTE — TELEPHONE ENCOUNTER
----- Message from Ellis Willoughby sent at 5/18/2021  1:42 PM EDT -----  Regarding: telephone  General Message/Vendor Calls    Caller's first and last name:  PT       Reason for call: PT has questions on when she should take her vitamin B12 medication       Callback required yes/no and why: yes to go over the medication       Best contact number(s): [phone]      Details to clarify the request: n/a       Ellis Willoughby

## 2021-05-18 NOTE — TELEPHONE ENCOUNTER
I called pt back to advise of result note that was written by pcp. Pt asked how should she take vit b12, I advise take both tablets at the same time daily.  Pt wants to know should she take vitb12 in the AM or PM?  she read on google that different sites recommend different times of the day    3wk f/up appt schedule with pcp

## 2021-07-05 RX ORDER — DILTIAZEM HYDROCHLORIDE 240 MG/1
CAPSULE, EXTENDED RELEASE ORAL
Qty: 30 CAPSULE | Refills: 0 | Status: SHIPPED | OUTPATIENT
Start: 2021-07-05 | End: 2021-07-29

## 2021-07-08 DIAGNOSIS — I10 ESSENTIAL HYPERTENSION: ICD-10-CM

## 2021-07-09 RX ORDER — CLONIDINE HYDROCHLORIDE 0.1 MG/1
0.05 TABLET ORAL 2 TIMES DAILY
Qty: 60 TABLET | Refills: 0 | Status: SHIPPED | OUTPATIENT
Start: 2021-07-09 | End: 2021-07-14 | Stop reason: SDUPTHER

## 2021-07-14 DIAGNOSIS — I10 ESSENTIAL HYPERTENSION: ICD-10-CM

## 2021-07-14 RX ORDER — CLONIDINE HYDROCHLORIDE 0.1 MG/1
0.05 TABLET ORAL 2 TIMES DAILY
Qty: 180 TABLET | Refills: 0 | Status: SHIPPED | OUTPATIENT
Start: 2021-07-14 | End: 2022-06-07

## 2021-10-24 RX ORDER — DILTIAZEM HYDROCHLORIDE 240 MG/1
CAPSULE, EXTENDED RELEASE ORAL
Qty: 90 CAPSULE | Refills: 0 | Status: SHIPPED | OUTPATIENT
Start: 2021-10-24 | End: 2022-01-20

## 2021-10-24 NOTE — TELEPHONE ENCOUNTER
Please call patient, no further refills until office visit. If bridge fill is needed due to NOV > 30 days, please place order.  (Adam Griggs)

## 2022-02-11 ENCOUNTER — TELEPHONE (OUTPATIENT)
Dept: INTERNAL MEDICINE CLINIC | Age: 62
End: 2022-02-11

## 2022-02-11 NOTE — TELEPHONE ENCOUNTER
Patient would like a call back letting her know whether or not she can come in the office for her appointment on Monday as she was around her daughter last Sunday who got sick on Monday and has tested positive for Covid. Please call back and advise.

## 2022-02-11 NOTE — TELEPHONE ENCOUNTER
I spoke with patient, she was with her daughter on Sunday 2/9/22; her daughter tested positive on Wednesday 2/9/22 with pcr test for covid. Tuesday rapid was negative. Pt has both vaccines but not booster. I advise per cdc she should do a covid test on day 5 even if she isn't having sx. quarantined for 5 days but take precautionary measures until day 10 ( social distance, wear mask) pt verbalized understanding. We r/s her office visit until the following week.

## 2022-02-11 NOTE — TELEPHONE ENCOUNTER
----- Message from Hunter Guevara sent at 2/10/2022  3:47 PM EST -----  Subject: Message to Provider    QUESTIONS  Information for Provider? Patient would like to know how long she should   quarantine? Patient has appt scheduled with Michelle Serrano MD   on 2/14/2022 1:40 PM she was exposed to covid on Tuesday by daughter she   want's to know if she can still come to her appt.   ---------------------------------------------------------------------------  --------------  CALL BACK INFO  What is the best way for the office to contact you? OK to leave message on   voicemail  Preferred Call Back Phone Number? [phone]  ---------------------------------------------------------------------------  --------------  SCRIPT ANSWERS  Relationship to Patient?  Self

## 2022-02-20 RX ORDER — DILTIAZEM HYDROCHLORIDE 240 MG/1
CAPSULE, EXTENDED RELEASE ORAL
Qty: 30 CAPSULE | Refills: 0 | Status: SHIPPED | OUTPATIENT
Start: 2022-02-20 | End: 2022-02-27

## 2022-02-23 ENCOUNTER — OFFICE VISIT (OUTPATIENT)
Dept: INTERNAL MEDICINE CLINIC | Age: 62
End: 2022-02-23
Payer: COMMERCIAL

## 2022-02-23 VITALS
SYSTOLIC BLOOD PRESSURE: 124 MMHG | HEIGHT: 62 IN | BODY MASS INDEX: 49.69 KG/M2 | HEART RATE: 85 BPM | WEIGHT: 270 LBS | DIASTOLIC BLOOD PRESSURE: 77 MMHG | OXYGEN SATURATION: 91 % | TEMPERATURE: 98.6 F | RESPIRATION RATE: 14 BRPM

## 2022-02-23 DIAGNOSIS — L57.0 ACTINIC KERATOSIS: ICD-10-CM

## 2022-02-23 DIAGNOSIS — I10 ESSENTIAL HYPERTENSION: Primary | ICD-10-CM

## 2022-02-23 DIAGNOSIS — E66.01 MORBID OBESITY WITH BMI OF 45.0-49.9, ADULT (HCC): ICD-10-CM

## 2022-02-23 DIAGNOSIS — F32.A MILD DEPRESSION: ICD-10-CM

## 2022-02-23 DIAGNOSIS — Z12.39 BREAST SCREENING: ICD-10-CM

## 2022-02-23 DIAGNOSIS — Z87.891 SMOKING HISTORY: ICD-10-CM

## 2022-02-23 DIAGNOSIS — E03.4 HYPOTHYROIDISM DUE TO ACQUIRED ATROPHY OF THYROID: ICD-10-CM

## 2022-02-23 DIAGNOSIS — E53.8 LOW SERUM VITAMIN B12: ICD-10-CM

## 2022-02-23 PROCEDURE — 99214 OFFICE O/P EST MOD 30 MIN: CPT | Performed by: INTERNAL MEDICINE

## 2022-02-23 RX ORDER — LEVOTHYROXINE SODIUM 125 UG/1
112 TABLET ORAL
Qty: 90 TABLET | Refills: 0
Start: 2022-02-23

## 2022-02-23 NOTE — PROGRESS NOTES
Diagnoses and all orders for this visit:    1. Essential hypertension  Patient blood pressure was rechecked again and also controlled  She is able to get into a meditative type state and on recheck within normal limits  Continue meds  Check labs  -     METABOLIC PANEL, COMPREHENSIVE; Future  -     LIPID PANEL; Future    2. Breast screening  Patient has not done her screening. She shares that she has underlying anxiety and this keeps her from going to medical visits. -     DANYELL 3D MYLES W MAMMO BI SCREENING INCL CAD; Future    3. Mild depression ([address]  She is being followed by nurse practitioner psychiatry [facility]  Continue meds  Her depression and anxiety are impeding her behavioral changes for better health. Empathetic listening provided continue support for patient    4. Actinic keratosis  Left temporal actinic keratosis and skin cancer screening  -     REFERRAL TO DERMATOLOGY    5. Hypothyroidism due to acquired atrophy of thyroid  Patient being followed by Dr. Nelson Listen meds for now she appears to be euthyroid  -     levothyroxine (Synthroid) 125 mcg tablet; Take 1 Tablet by mouth Daily (before breakfast). Dr. Matthew Abbott Rxing    6. Morbid obesity with BMI of 45.0-49.9, adult [facility])  Patient is not motivated to exercise because her gait is unsteady and arches is collapsed  Encouraged exercise of with sitting    7. Low serum vitamin B12  Replete as needed  -     VITAMIN B12; Future    8. Smoking history  She is smoking 1 pack/day. She is not interested in quitting right now. Follow-up with specialists and follow-up in 6 months, August 2022 or earlier if needed.       Chief Complaint   Patient presents with    Follow-up       Anxiety/depression and ADD  She is being followed by nurse practitioner Gerardo Morin  She will follow up in March early  venlafexine 225 mg/day  adderall 20 mg TID  Lorazepam 1mg as needed  quetipine 50 mg qhs  She has a lot of anxiety and depression prior to coming in to visits. She feels her anxiety keeps her from going to her medical visits      Subjective:   Kathe Bryant is a 63 y.o. female with hypertension. Hypertension ROS: taking medications as instructed, no medication side effects noted, no TIA's, no chest pain on exertion, no dyspnea on exertion, no swelling of ankles. New concerns: None. She is not exercising. She is still smoking about a pack a day. She is not interested in quitting right now. .       SUBJECTIVE: Kathe Bryant is a 63 y.o. female here for follow up of hypothyroidism. Lab Results   Component Value Date/Time    TSH 3.27 10/02/2017 04:51 AM     Thyroid ROS: denies fatigue, weight changes, heat/cold intolerance, bowel/skin changes or CVS symptoms.          Past Medical History:   Diagnosis Date    Anxiety and depression     Dr. Yesi Sibley    HTN (hypertension)     Hyperlipidemia LDL goal < 130     Hypothyroid     dr. Abhinav Harris Morbid obesity with BMI of 40.0-44.9, adult ([facility] [address]     Tobacco abuse      Past Surgical History:   Procedure Laterality Date    HX APPENDECTOMY      HX BREAST BIOPSY Left 2012    Stereotactic-Fibrocystic change usual ductal hyperplasia    HX COLONOSCOPY  06/25/2019    HX DILATION AND CURETTAGE       Social History     Socioeconomic History    Marital status:    Tobacco Use    Smoking status: Current Every Day Smoker     Packs/day: 1.00    Smokeless tobacco: Never Used    Tobacco comment:  1 ppd/40+ years   Substance and Sexual Activity    Alcohol use: No    Drug use: No     Comment: 1 pack a day    Sexual activity: Never   Social History Narrative            2 children        Not employed/filing for disability    Stopped working after children were born 20 yo and 11 yo     Family History   Family history unknown: Yes     Current Outpatient Medications   Medication Sig Dispense Refill    Tiadylt  mg capsule TAKE 1 CAPSULE BY MOUTH EVERY DAY 30 Capsule 0    cyanocobalamin (VITAMIN B12) 500 mcg tablet TAKE 1 TABLET BY MOUTH EVERY  Tablet 0    cloNIDine HCL (CATAPRES) 0.1 mg tablet Take 0.5 Tablets by mouth two (2) times a day. 180 Tablet 0    venlafaxine-SR (EFFEXOR XR) 75 mg capsule Take 225 mg by mouth daily.  QUEtiapine (SEROQUEL) 25 mg tablet Take 50 mg by mouth nightly.  LORazepam (ATIVAN) 1 mg tablet Take 1 mg by mouth two (2) times daily as needed for Anxiety.  cholecalciferol, vitamin D3, (VITAMIN D3) 2,000 unit tab Take 2,000 Units by mouth five (5) days a week. 1 Tablet Monday - Friday. 2 Tablets Sat, Sun.      levothyroxine (SYNTHROID) 112 mcg tablet Take 112 mcg by mouth Daily (before breakfast). 125mcg      ketoconazole (NIZORAL) 2 % topical cream Apply  to affected area two (2) times a day. APPLY TO rash under axilla and breast    Nursing, document site in comments (Patient taking differently: Apply  to affected area two (2) times a day. APPLY TO rash under axilla and breast    Nursing, document site in comments  Indications: not using) 15 g 0    furosemide (LASIX) 20 mg tablet Take 1 Tab by mouth every Monday, Wednesday, Friday, Saturday. (Patient taking differently: Take 20 mg by mouth every Monday, Wednesday, Friday, Saturday. Indications: not taking) 36 Tab 1    pravastatin (PRAVACHOL) 10 mg tablet TAKE 1 TABLET BY MOUTH EVERY DAY AT NIGHT (Patient taking differently: Indications: not taking) 90 Tab 3    levalbuterol tartrate (XOPENEX) 45 mcg/actuation inhaler Take 2 Puffs by inhalation every four (4) hours as needed for Wheezing or Shortness of Breath. (Patient taking differently: Take 2 Puffs by inhalation every four (4) hours as needed for Wheezing or Shortness of Breath. Indications: not taking) 1 Inhaler 0    dextroamphetamine-amphetamine (ADDERALL) 15 mg tablet Take 1 Tab (15 mg total) by mouth two (2) times a day.   Max Daily Amount: 30 mg (Patient taking differently: Take 20 mg by mouth two (2) times a day.) 14 Tab 0     Allergies   Allergen Reactions    Egg Other (comments)     Abdominal pain       Review of Systems - General ROS: negative for - chills or fever  Cardiovascular ROS: no chest pain or dyspnea on exertion  Respiratory ROS: no cough, shortness of breath, or wheezing    Visit Vitals  /77 (BP 1 Location: Left upper arm, BP Patient Position: Sitting, BP Cuff Size: Adult)   Pulse 85   Temp 98.6 °F (37 °C) (Oral)   Resp 14   Ht 5' 2\" (1.575 m)   Wt 270 lb (122.5 kg)   LMP 07/13/2007   SpO2 91%   BMI 49.38 kg/m²     Constitutional: [x] Appears well-developed and well-nourished [x] No apparent distress      [] Abnormal -     Mental status: [x] Alert and awake  [x] Oriented to person/place/time [x] Able to follow commands    [] Abnormal -     Eyes:   EOM    [x]  Normal    [] Abnormal -   Sclera  [x]  Normal    [] Abnormal -          Discharge [x]  None visible   [] Abnormal -     HENT: [x] Normocephalic, atraumatic  [] Abnormal -   [x] Mouth/Throat: Mucous membranes are moist    External Ears [x] Normal  [] Abnormal -    Neck: [x] No visualized mass [] Abnormal -     Pulmonary/Chest: [x] Respiratory effort normal   [x] No visualized signs of difficulty breathing or respiratory distress        [] Abnormal -      Musculoskeletal:   [x] Normal gait with no signs of ataxia         [x] Normal range of motion of neck        [] Abnormal -     Neurological:        [x] No Facial Asymmetry (Cranial nerve 7 motor function) (limited exam due to video visit)          [x] No gaze palsy        [] Abnormal -          Skin:        [x] No significant exanthematous lesions or discoloration noted on facial skin         [] Abnormal -            Psychiatric:       [x] Normal Affect [] Abnormal -        [x] No Hallucinations      ATTENTION:   This medical record was transcribed using an electronic medical records/speech recognition system. Although proofread, it may and can contain electronic, spelling and other errors. Corrections may be executed at a later time. Please contact us for any clarifications as needed.

## 2022-02-24 LAB
ALBUMIN SERPL-MCNC: 3.8 G/DL (ref 3.5–5)
ALBUMIN/GLOB SERPL: 1.3 {RATIO} (ref 1.1–2.2)
ALP SERPL-CCNC: 152 U/L (ref 45–117)
ALT SERPL-CCNC: 21 U/L (ref 12–78)
ANION GAP SERPL CALC-SCNC: 4 MMOL/L (ref 5–15)
AST SERPL-CCNC: 24 U/L (ref 15–37)
BILIRUB SERPL-MCNC: 0.3 MG/DL (ref 0.2–1)
BUN SERPL-MCNC: 11 MG/DL (ref 6–20)
BUN/CREAT SERPL: 12 (ref 12–20)
CALCIUM SERPL-MCNC: 8.9 MG/DL (ref 8.5–10.1)
CHLORIDE SERPL-SCNC: 103 MMOL/L (ref 97–108)
CHOLEST SERPL-MCNC: 217 MG/DL
CO2 SERPL-SCNC: 29 MMOL/L (ref 21–32)
CREAT SERPL-MCNC: 0.93 MG/DL (ref 0.55–1.02)
GLOBULIN SER CALC-MCNC: 3 G/DL (ref 2–4)
GLUCOSE SERPL-MCNC: 93 MG/DL (ref 65–100)
HDLC SERPL-MCNC: 45 MG/DL
HDLC SERPL: 4.8 {RATIO} (ref 0–5)
LDLC SERPL CALC-MCNC: 154.4 MG/DL (ref 0–100)
POTASSIUM SERPL-SCNC: 4.5 MMOL/L (ref 3.5–5.1)
PROT SERPL-MCNC: 6.8 G/DL (ref 6.4–8.2)
SODIUM SERPL-SCNC: 136 MMOL/L (ref 136–145)
TRIGL SERPL-MCNC: 88 MG/DL (ref ?–150)
VIT B12 SERPL-MCNC: 1370 PG/ML (ref 193–986)
VLDLC SERPL CALC-MCNC: 17.6 MG/DL

## 2022-02-27 DIAGNOSIS — E78.2 MIXED HYPERLIPIDEMIA: ICD-10-CM

## 2022-02-27 RX ORDER — PRAVASTATIN SODIUM 10 MG/1
10 TABLET ORAL
Qty: 90 TABLET | Refills: 0 | Status: SHIPPED | OUTPATIENT
Start: 2022-02-27 | End: 2022-05-29

## 2022-03-18 PROBLEM — E03.4 HYPOTHYROIDISM DUE TO ACQUIRED ATROPHY OF THYROID: Status: ACTIVE | Noted: 2018-10-11

## 2022-03-18 PROBLEM — E66.9 OBESITY: Status: ACTIVE | Noted: 2018-01-16

## 2022-03-19 PROBLEM — N17.9 AKI (ACUTE KIDNEY INJURY) (HCC): Status: ACTIVE | Noted: 2017-09-30

## 2022-03-19 PROBLEM — E87.20 LACTIC ACID ACIDOSIS: Status: ACTIVE | Noted: 2018-01-17

## 2022-03-19 PROBLEM — R50.9 FEVER: Status: ACTIVE | Noted: 2018-01-17

## 2022-03-19 PROBLEM — I10 HTN (HYPERTENSION): Status: ACTIVE | Noted: 2017-09-30

## 2022-03-19 PROBLEM — E66.01 OBESITY, MORBID (HCC): Status: ACTIVE | Noted: 2018-01-24

## 2022-03-19 PROBLEM — R00.0 SINUS TACHYCARDIA: Status: ACTIVE | Noted: 2018-01-17

## 2022-03-20 PROBLEM — J18.9 PNEUMONIA: Status: ACTIVE | Noted: 2018-01-16

## 2022-03-20 PROBLEM — A41.9 SEPSIS (HCC): Status: ACTIVE | Noted: 2017-09-30

## 2022-03-20 PROBLEM — F32.A MILD DEPRESSION: Status: ACTIVE | Noted: 2018-10-11

## 2022-05-29 DIAGNOSIS — E78.2 MIXED HYPERLIPIDEMIA: ICD-10-CM

## 2022-05-29 RX ORDER — PRAVASTATIN SODIUM 10 MG/1
10 TABLET ORAL
Qty: 90 TABLET | Refills: 0 | Status: SHIPPED | OUTPATIENT
Start: 2022-05-29 | End: 2022-06-07

## 2022-06-07 ENCOUNTER — HOSPITAL ENCOUNTER (OUTPATIENT)
Dept: GENERAL RADIOLOGY | Age: 62
Discharge: HOME | End: 2022-06-07
Payer: COMMERCIAL

## 2022-06-07 ENCOUNTER — OFFICE VISIT (OUTPATIENT)
Dept: INTERNAL MEDICINE CLINIC | Age: 62
End: 2022-06-07
Payer: COMMERCIAL

## 2022-06-07 VITALS
OXYGEN SATURATION: 95 % | SYSTOLIC BLOOD PRESSURE: 138 MMHG | RESPIRATION RATE: 14 BRPM | HEIGHT: 62 IN | HEART RATE: 85 BPM | WEIGHT: 263 LBS | BODY MASS INDEX: 48.4 KG/M2 | DIASTOLIC BLOOD PRESSURE: 86 MMHG | TEMPERATURE: 98.9 F

## 2022-06-07 DIAGNOSIS — R05.9 COUGH: Primary | ICD-10-CM

## 2022-06-07 DIAGNOSIS — I10 ESSENTIAL HYPERTENSION: ICD-10-CM

## 2022-06-07 DIAGNOSIS — E78.2 MIXED HYPERLIPIDEMIA: ICD-10-CM

## 2022-06-07 DIAGNOSIS — R05.9 COUGH: ICD-10-CM

## 2022-06-07 DIAGNOSIS — J44.9 CHRONIC OBSTRUCTIVE PULMONARY DISEASE, UNSPECIFIED COPD TYPE (HCC): ICD-10-CM

## 2022-06-07 DIAGNOSIS — R74.8 ELEVATED ALKALINE PHOSPHATASE LEVEL: ICD-10-CM

## 2022-06-07 PROCEDURE — 71046 X-RAY EXAM CHEST 2 VIEWS: CPT

## 2022-06-07 PROCEDURE — 99214 OFFICE O/P EST MOD 30 MIN: CPT | Performed by: INTERNAL MEDICINE

## 2022-06-07 RX ORDER — BUDESONIDE AND FORMOTEROL FUMARATE DIHYDRATE 160; 4.5 UG/1; UG/1
1 AEROSOL RESPIRATORY (INHALATION) 2 TIMES DAILY
Qty: 10.2 G | Refills: 1 | Status: SHIPPED | OUTPATIENT
Start: 2022-06-07 | End: 2022-08-09

## 2022-06-07 RX ORDER — PRAVASTATIN SODIUM 20 MG/1
20 TABLET ORAL
Qty: 90 TABLET | Refills: 1 | Status: SHIPPED | OUTPATIENT
Start: 2022-06-07

## 2022-06-07 NOTE — PATIENT INSTRUCTIONS
Deciding About Using Medicines To Quit Smoking  How can you decide about using medicines to quit smoking? What are the medicines you can use? Your doctor may prescribe varenicline (Chantix) or bupropion (Zyban). These medicines can help you cope with cravings for tobacco. They are pills that don't contain nicotine. You also can use nicotine replacement products. These do contain nicotine. There are many types. · Gum and lozenges slowly release nicotine into your mouth. · Patches stick to your skin. They slowly release nicotine into your bloodstream.  · An inhaler has a crowell that contains nicotine. You breathe in a puff of nicotine vapor through your mouth and throat. · Nasal spray releases a mist that contains nicotine. What are key points about this decision? · Using medicines can double your chances of quitting smoking. They can ease cravings and withdrawal symptoms. · Getting counseling along with using medicine can raise your chances of quitting even more. · If you smoke fewer than 5 cigarettes a day, you may not need medicines to help you quit smoking. · These medicines have less nicotine than cigarettes. And by itself, nicotine is not nearly as harmful as smoking. The tars, carbon monoxide, and other toxic chemicals in tobacco cause the harmful effects. · The side effects of nicotine replacement products depend on the type of product. For example, a patch can make your skin red and itchy. Medicines in pill form can make you sick to your stomach. They can also cause dry mouth and trouble sleeping. For most people, the side effects are not bad enough to make them stop using the products. Why might you choose to use medicines to quit smoking? · You have tried on your own to stop smoking, but you were not able to stop. · You smoke more than 5 cigarettes a day. · You want to increase your chances of quitting smoking. · You want to reduce your cravings and withdrawal symptoms.   · You feel the benefits of medicine outweigh the side effects. Why might you choose not to use medicine? · You want to try quitting on your own by stopping all at once (\"cold turkey\"). · You want to cut back slowly on the number of cigarettes you smoke. · You smoke fewer than 5 cigarettes a day. · You do not like using medicine. · You feel the side effects of medicines outweigh the benefits. · You are worried about the cost of medicines. Your decision  Thinking about the facts and your feelings can help you make a decision that is right for you. Be sure you understand the benefits and risks of your options, and think about what else you need to do before you make the decision. Where can you learn more? Go to http://www.gray.com/  Enter I075 in the search box to learn more about \"Deciding About Using Medicines To Quit Smoking. \"  Current as of: October 28, 2021               Content Version: 13.2  © 8104-6473 [employer]. Care instructions adapted under license by [employer] (which disclaims liability or warranty for this information). If you have questions about a medical condition or this instruction, always ask your healthcare professional. [place] [address] any warranty or liability for your use of this information.

## 2022-06-07 NOTE — PROGRESS NOTES
Diagnoses and all orders for this visit:    1. Cough  History of smoking for 45+ years  Currently smoking about 15 cigarettes/day  Likely underlying COPD  Continue albuterol as needed  Initiate Symbicort  -     XR CHEST PA LAT; Future  -     budesonide-formoteroL (SYMBICORT) 160-4.5 mcg/actuation HFAA; Take 1 Puff by inhalation two (2) times a day. Rinse mouth after use    When patient is ready to stop smoking we will transition her to patch  Patients smoking >10 cigarettes/day: Begin with step 1 (21 mg/day) for 6 weeks, followed by step 2 (14 mg/day) for 2 weeks; finish with step 3 (7 mg/day) for 2 weeks. 2. Mixed hyperlipidemia  Not optimally controlled  Did not receive letter so printed for her and discussed  Will increase pravastatin to 20 mg and recheck in 6 months  -     pravastatin (PRAVACHOL) 20 mg tablet; Take 1 Tablet by mouth nightly. -     METABOLIC PANEL, COMPREHENSIVE; Future  -     LIPID PANEL; Future    3. Elevated alkaline phosphatase level  We will get labs checked in 6 months  -     ALK PHOS ISOENZYMES; Future  -     GGT; Future    4. Chronic obstructive pulmonary disease, unspecified COPD type ([facility] [address]  Likely underlying COPD      5. Hypertension  Stable  Continue diltiazem    Wound right leg  Healed  Does not appear to be infected          Follow-up in December or earlier if needed. Chief Complaint   Patient presents with    Follow-up       Right wound contusion  Bumped leg on cardboard box corrigaety    Hyperlipidemia  Did not get letter from feb with cholesterol  She is not aware to increase her pravastatin to 20 mg. She is trying to eat a heart healthy diet. Weight is stable. covid +  Monday May 2nd  Sx for 1 week improved and then developed low grade temp/covid sx again. She does not feel she has symptoms now      Smoking cessation  She is intersted in patches but not committed to smoking cessation just yet.   She is smoking 15 cigrates /day      Anxiety  She is being followed by nurse practitioner Nikhil Macdonald  She will follow up in March early  venlafexine 225 mg/day  adderall 20 mg TID  Lorazepam 1mg as needed  quetipine 50 mg qhs  She has a lot of anxiety and depression prior to coming in to visits. She feels her anxiety keeps her from going to her medical visits      Subjective:   Leeroy Jimenez is a 63 y.o. female with hypertension. Hypertension ROS: taking medications as instructed, no medication side effects noted, no TIA's, no chest pain on exertion, no dyspnea on exertion, no swelling of ankles. New concerns: None. She is not exercising. She has not had any Lasix today    SUBJECTIVE: Leeroy Jimenez is a 63 y.o. female here for follow up of hypothyroidism.   Dr. Arlet Chaudhary is following        Past Medical History:   Diagnosis Date    Anxiety and depression     Dr. Kenia Argueta    HTN (hypertension)     Hyperlipidemia LDL goal < 130     Hypothyroid     dr. Fabio Graf Morbid obesity with BMI of 40.0-44.9, adult ([facility] [address]     Tobacco abuse      Past Surgical History:   Procedure Laterality Date    HX APPENDECTOMY      HX BREAST BIOPSY Left 2012    Stereotactic-Fibrocystic change usual ductal hyperplasia    HX COLONOSCOPY  06/25/2019    HX DILATION AND CURETTAGE       Social History     Socioeconomic History    Marital status:    Tobacco Use    Smoking status: Current Every Day Smoker     Packs/day: 1.00    Smokeless tobacco: Never Used    Tobacco comment:  1 ppd/40+ years   Substance and Sexual Activity    Alcohol use: No    Drug use: No     Comment: 1 pack a day    Sexual activity: Never   Social History Narrative            2 children        Not employed/filing for disability    Stopped working after children were born 22 yo and 11 yo     Family History   Family history unknown: Yes     Current Outpatient Medications   Medication Sig Dispense Refill    pravastatin (PRAVACHOL) 10 mg tablet TAKE 1 TABLET BY MOUTH NIGHTLY 90 Tablet 0    Tiadylt  mg capsule TAKE 1 CAPSULE BY MOUTH EVERY DAY 90 Capsule 3    levothyroxine (Synthroid) 125 mcg tablet Take 1 Tablet by mouth Daily (before breakfast). Dr. Luis Manuel Simental Rxing 90 Tablet 0    cyanocobalamin (VITAMIN B12) 500 mcg tablet TAKE 1 TABLET BY MOUTH EVERY  Tablet 0    levalbuterol tartrate (XOPENEX) 45 mcg/actuation inhaler Take 2 Puffs by inhalation every four (4) hours as needed for Wheezing or Shortness of Breath. (Patient taking differently: Take 2 Puffs by inhalation every four (4) hours as needed for Wheezing or Shortness of Breath. Indications: not taking) 1 Inhaler 0    venlafaxine-SR (EFFEXOR XR) 75 mg capsule Take 225 mg by mouth daily.  QUEtiapine (SEROQUEL) 25 mg tablet Take 50 mg by mouth nightly.  LORazepam (ATIVAN) 1 mg tablet Take 1 mg by mouth two (2) times daily as needed for Anxiety.  cholecalciferol, vitamin D3, (VITAMIN D3) 2,000 unit tab Take 2,000 Units by mouth five (5) days a week. 1 Tablet Monday - Friday. 2 Tablets Sat, Sun.      ketoconazole (NIZORAL) 2 % topical cream Apply  to affected area two (2) times a day. APPLY TO rash under axilla and breast    Nursing, document site in comments (Patient not taking: Reported on 6/7/2022) 15 g 0    dextroamphetamine-amphetamine (ADDERALL) 15 mg tablet Take 1 Tab (15 mg total) by mouth two (2) times a day.   Max Daily Amount: 30 mg (Patient taking differently: Take 20 mg by mouth two (2) times a day.) 14 Tab 0     Allergies   Allergen Reactions    Egg Other (comments)     Abdominal pain       Review of Systems - General ROS: negative for - chills or fever  Cardiovascular ROS: no chest pain or dyspnea on exertion  Respiratory ROS: no cough, shortness of breath, or wheezing    Visit Vitals  BP (!) 144/78 (BP 1 Location: Left upper arm, BP Patient Position: Sitting, BP Cuff Size: Adult)   Pulse 85   Temp 98.9 °F (37.2 °C) (Oral)   Resp 14   Ht 5' 2\" (1.575 m)   Wt 263 lb (119.3 kg) LMP 07/13/2007   SpO2 95%   BMI 48.10 kg/m²     Constitutional: [x] Appears well-developed and well-nourished [x] No apparent distress      [] Abnormal -     Mental status: [x] Alert and awake  [x] Oriented to person/place/time [x] Able to follow commands    [] Abnormal -     Eyes:   EOM    [x]  Normal    [] Abnormal -   Sclera  [x]  Normal    [] Abnormal -          Discharge [x]  None visible   [] Abnormal -     HENT: [x] Normocephalic, atraumatic  [] Abnormal -   [x] Mouth/Throat: Mucous membranes are moist    External Ears [x] Normal  [] Abnormal -    Neck: [x] No visualized mass [] Abnormal -     Pulmonary/Chest: [x] Respiratory effort normal   [x] No visualized signs of difficulty breathing or respiratory distress        [] Abnormal -      Musculoskeletal:   [x] Normal gait with no signs of ataxia         [x] Normal range of motion of neck        [] Abnormal -     Neurological:        [x] No Facial Asymmetry (Cranial nerve 7 motor function) (limited exam due to video visit)          [x] No gaze palsy        [] Abnormal -          Skin:        [x] No significant exanthematous lesions or discoloration noted on facial skin         [] Abnormal -            Psychiatric:       [x] Normal Affect [] Abnormal -        [x] No Hallucinations      ATTENTION:   This medical record was transcribed using an electronic medical records/speech recognition system. Although proofread, it may and can contain electronic, spelling and other errors. Corrections may be executed at a later time. Please contact us for any clarifications as needed.

## 2022-06-08 ENCOUNTER — TELEPHONE (OUTPATIENT)
Dept: INTERNAL MEDICINE CLINIC | Age: 62
End: 2022-06-08

## 2022-06-08 NOTE — TELEPHONE ENCOUNTER
I spoke with patient to advise of pcp's message in other telephone encounter. Pt verbalized understanding. She hasn't gotten a phone call from the pharmacy if her scripts are ready. I advised pt to call the pharmacy to f/up if meds are ready, if they dont have them then please call us back.  Pt verbalized understanding

## 2022-06-08 NOTE — TELEPHONE ENCOUNTER
----- Message from Jojo Diana MD sent at 6/7/2022 11:58 PM EDT -----  Please let pt know her xray was overall normal. The radiologist noted that she may not have had a full expansion of her lungs when xray done. Please try the inhalers and can monitor her cough. If sx not improving make an appt.  Thank you

## 2022-06-08 NOTE — TELEPHONE ENCOUNTER
I called pts cell phone, no answer. Lm on vm to call back for test results. I called pts home #, no answer.  LM on VM to call back for test results

## 2022-08-09 DIAGNOSIS — R05.9 COUGH: ICD-10-CM

## 2022-08-09 RX ORDER — BUDESONIDE AND FORMOTEROL FUMARATE DIHYDRATE 160; 4.5 UG/1; UG/1
1 AEROSOL RESPIRATORY (INHALATION) 2 TIMES DAILY
Qty: 10.2 EACH | Refills: 1 | Status: SHIPPED | OUTPATIENT
Start: 2022-08-09

## 2022-10-10 NOTE — ED TRIAGE NOTES
Pt reports recent UTI, sent by doctor to come here. Pt has generalized pain, flank pain, and fever for several days.
Yes

## 2022-12-03 DIAGNOSIS — E78.2 MIXED HYPERLIPIDEMIA: ICD-10-CM

## 2022-12-03 RX ORDER — PRAVASTATIN SODIUM 20 MG/1
20 TABLET ORAL
Qty: 90 TABLET | Refills: 1 | Status: SHIPPED | OUTPATIENT
Start: 2022-12-03

## 2023-01-22 DIAGNOSIS — R05.9 COUGH: ICD-10-CM

## 2023-01-22 RX ORDER — BUDESONIDE AND FORMOTEROL FUMARATE DIHYDRATE 160; 4.5 UG/1; UG/1
1 AEROSOL RESPIRATORY (INHALATION) 2 TIMES DAILY
Qty: 10.2 EACH | Refills: 1 | Status: SHIPPED | OUTPATIENT
Start: 2023-01-22

## 2023-06-02 RX ORDER — DILTIAZEM HYDROCHLORIDE 240 MG/1
240 CAPSULE, EXTENDED RELEASE ORAL DAILY
Qty: 90 CAPSULE | Refills: 0 | Status: SHIPPED | OUTPATIENT
Start: 2023-06-02

## 2023-06-02 NOTE — TELEPHONE ENCOUNTER
Msg placed on rx sig to remind pt she needs to call to make an appt for cpe to prevent a delay in future refill requests

## 2023-06-09 DIAGNOSIS — E78.2 MIXED HYPERLIPIDEMIA: ICD-10-CM

## 2023-06-09 RX ORDER — PRAVASTATIN SODIUM 20 MG
TABLET ORAL
Qty: 90 TABLET | Refills: 1 | Status: SHIPPED | OUTPATIENT
Start: 2023-06-09

## 2023-08-30 RX ORDER — DILTIAZEM HYDROCHLORIDE 240 MG/1
CAPSULE, EXTENDED RELEASE ORAL
Qty: 90 CAPSULE | Refills: 0 | OUTPATIENT
Start: 2023-08-30

## 2023-08-30 NOTE — TELEPHONE ENCOUNTER
I called pt, Pt's name and  verified. I advised an appt needs to be schedule so we can send in a short supply of medication until she is seen. Messages were placed on 2 rx's in  reminding pt an appt was needed, last seen . Pt states she hasn't been sick, I reminded pt we have to see her at least on a yearly basis for medication management/follow up. We cant prescribe meds without pt being seen. Pt states she didn't get the notifications from . She just woke up and states she will call back to make an appt. Pt states she isn't out of her medication right now. I advised we will send in refill when she calls back to make an appt.

## 2023-09-19 RX ORDER — DILTIAZEM HYDROCHLORIDE 240 MG/1
240 CAPSULE, EXTENDED RELEASE ORAL DAILY
Qty: 90 CAPSULE | Refills: 0 | Status: SHIPPED | OUTPATIENT
Start: 2023-09-19 | End: 2023-09-20

## 2023-09-19 NOTE — TELEPHONE ENCOUNTER
Medication refill     dilTIAZem (TIADYLT ER) 240 MG extended release capsule     [id number] [phone] [place] [phone]    She states she really needs her medication

## 2023-09-20 RX ORDER — DILTIAZEM HYDROCHLORIDE 240 MG/1
CAPSULE, EXTENDED RELEASE ORAL
Qty: 90 CAPSULE | Refills: 1 | Status: SHIPPED | OUTPATIENT
Start: 2023-09-20

## 2023-10-17 ENCOUNTER — OFFICE VISIT (OUTPATIENT)
Age: 63
End: 2023-10-17
Payer: COMMERCIAL

## 2023-10-17 VITALS
SYSTOLIC BLOOD PRESSURE: 138 MMHG | RESPIRATION RATE: 14 BRPM | HEIGHT: 62 IN | TEMPERATURE: 97.9 F | HEART RATE: 64 BPM | BODY MASS INDEX: 48.1 KG/M2 | DIASTOLIC BLOOD PRESSURE: 86 MMHG | OXYGEN SATURATION: 89 %

## 2023-10-17 DIAGNOSIS — Z12.83 SKIN CANCER SCREENING: ICD-10-CM

## 2023-10-17 DIAGNOSIS — I10 ESSENTIAL (PRIMARY) HYPERTENSION: ICD-10-CM

## 2023-10-17 DIAGNOSIS — G89.29 CHRONIC PAIN OF BOTH ANKLES: ICD-10-CM

## 2023-10-17 DIAGNOSIS — E78.2 MIXED HYPERLIPIDEMIA: ICD-10-CM

## 2023-10-17 DIAGNOSIS — R73.09 ELEVATED GLUCOSE: ICD-10-CM

## 2023-10-17 DIAGNOSIS — M25.571 CHRONIC PAIN OF BOTH ANKLES: ICD-10-CM

## 2023-10-17 DIAGNOSIS — M25.572 CHRONIC PAIN OF BOTH ANKLES: ICD-10-CM

## 2023-10-17 DIAGNOSIS — J44.9 CHRONIC OBSTRUCTIVE PULMONARY DISEASE, UNSPECIFIED COPD TYPE (HCC): Primary | ICD-10-CM

## 2023-10-17 DIAGNOSIS — F41.9 ANXIETY: ICD-10-CM

## 2023-10-17 DIAGNOSIS — F33.1 MODERATE EPISODE OF RECURRENT MAJOR DEPRESSIVE DISORDER (HCC): ICD-10-CM

## 2023-10-17 LAB
ERYTHROCYTE [DISTWIDTH] IN BLOOD BY AUTOMATED COUNT: 13.6 % (ref 11.5–14.5)
HCT VFR BLD AUTO: 43.9 % (ref 35–47)
HGB BLD-MCNC: 13.7 G/DL (ref 11.5–16)
MCH RBC QN AUTO: 27.6 PG (ref 26–34)
MCHC RBC AUTO-ENTMCNC: 31.2 G/DL (ref 30–36.5)
MCV RBC AUTO: 88.5 FL (ref 80–99)
NRBC # BLD: 0 K/UL (ref 0–0.01)
NRBC BLD-RTO: 0 PER 100 WBC
PLATELET # BLD AUTO: 247 K/UL (ref 150–400)
PMV BLD AUTO: 9.7 FL (ref 8.9–12.9)
RBC # BLD AUTO: 4.96 M/UL (ref 3.8–5.2)
WBC # BLD AUTO: 9.6 K/UL (ref 3.6–11)

## 2023-10-17 PROCEDURE — 3078F DIAST BP <80 MM HG: CPT | Performed by: INTERNAL MEDICINE

## 2023-10-17 PROCEDURE — 99215 OFFICE O/P EST HI 40 MIN: CPT | Performed by: INTERNAL MEDICINE

## 2023-10-17 PROCEDURE — 3075F SYST BP GE 130 - 139MM HG: CPT | Performed by: INTERNAL MEDICINE

## 2023-10-17 PROCEDURE — 99417 PROLNG OP E/M EACH 15 MIN: CPT | Performed by: INTERNAL MEDICINE

## 2023-10-17 RX ORDER — TRAZODONE HYDROCHLORIDE 50 MG/1
50 TABLET ORAL DAILY PRN
COMMUNITY
Start: 2023-07-25

## 2023-10-17 RX ORDER — BUDESONIDE AND FORMOTEROL FUMARATE DIHYDRATE 160; 4.5 UG/1; UG/1
AEROSOL RESPIRATORY (INHALATION)
COMMUNITY
Start: 2023-09-11

## 2023-10-17 SDOH — ECONOMIC STABILITY: FOOD INSECURITY: WITHIN THE PAST 12 MONTHS, THE FOOD YOU BOUGHT JUST DIDN'T LAST AND YOU DIDN'T HAVE MONEY TO GET MORE.: NEVER TRUE

## 2023-10-17 SDOH — ECONOMIC STABILITY: FOOD INSECURITY: WITHIN THE PAST 12 MONTHS, YOU WORRIED THAT YOUR FOOD WOULD RUN OUT BEFORE YOU GOT MONEY TO BUY MORE.: NEVER TRUE

## 2023-10-17 SDOH — ECONOMIC STABILITY: INCOME INSECURITY: HOW HARD IS IT FOR YOU TO PAY FOR THE VERY BASICS LIKE FOOD, HOUSING, MEDICAL CARE, AND HEATING?: NOT HARD AT ALL

## 2023-10-17 SDOH — ECONOMIC STABILITY: HOUSING INSECURITY
IN THE LAST 12 MONTHS, WAS THERE A TIME WHEN YOU DID NOT HAVE A STEADY PLACE TO SLEEP OR SLEPT IN A SHELTER (INCLUDING NOW)?: NO

## 2023-10-17 ASSESSMENT — PATIENT HEALTH QUESTIONNAIRE - PHQ9
SUM OF ALL RESPONSES TO PHQ QUESTIONS 1-9: 6
SUM OF ALL RESPONSES TO PHQ QUESTIONS 1-9: 6
1. LITTLE INTEREST OR PLEASURE IN DOING THINGS: 3
SUM OF ALL RESPONSES TO PHQ9 QUESTIONS 1 & 2: 6
SUM OF ALL RESPONSES TO PHQ QUESTIONS 1-9: 6
2. FEELING DOWN, DEPRESSED OR HOPELESS: 3
SUM OF ALL RESPONSES TO PHQ QUESTIONS 1-9: 6

## 2023-10-17 NOTE — PROGRESS NOTES
healthy diet. Weight is stable. covid +  Monday May 2nd  Sx for 1 week improved and then developed low grade temp/covid sx again. She does not feel she has symptoms now      Smoking cessation  She is intersted in patches but not committed to smoking cessation just yet. She is smoking 15 cigrates /day      Anxiety  She is being followed by nurse practitioner Tete Colmenares  She will follow up in March early  venlafexine 225 mg/day  adderall 20 mg TID  Lorazepam 1mg as needed  quetipine 50 mg qhs  She has a lot of anxiety and depression prior to coming in to visits. She feels her anxiety keeps her from going to her medical visits      Subjective:   Bridget Estevez is a 63 y.o. female with hypertension. Hypertension ROS: taking medications as instructed, no medication side effects noted, no TIA's, no chest pain on exertion, no dyspnea on exertion, no swelling of ankles. New concerns: None. She is not exercising. She has not had any Lasix today    SUBJECTIVE: Bridget Estevez is a 63 y.o. female here for follow up of hypothyroidism.   Dr. Marianne Mccray is following        Past Medical History:   Diagnosis Date    Anxiety and depression     Dr. Lisa Penny    HTN (hypertension)     Hyperlipidemia LDL goal < 130     Hypothyroid     dr. Nara Aguilar obesity with BMI of 40.0-44.9, adult ([facility])     Tobacco abuse      Past Surgical History:   Procedure Laterality Date    HX APPENDECTOMY      HX BREAST BIOPSY Left 2012    Stereotactic-Fibrocystic change usual ductal hyperplasia    HX COLONOSCOPY  06/25/2019    HX DILATION AND CURETTAGE       Social History     Socioeconomic History    Marital status:    Tobacco Use    Smoking status: Current Every Day Smoker     Packs/day: 1.00    Smokeless tobacco: Never Used    Tobacco comment:  1 ppd/40+ years   Substance and Sexual Activity    Alcohol use: No    Drug use: No     Comment: 1 pack a day    Sexual activity: Never   Social History Narrative

## 2023-10-18 LAB
ALBUMIN SERPL-MCNC: 3.5 G/DL (ref 3.5–5)
ALBUMIN/GLOB SERPL: 1.1 (ref 1.1–2.2)
ALP SERPL-CCNC: 172 U/L (ref 45–117)
ALT SERPL-CCNC: 22 U/L (ref 12–78)
ANION GAP SERPL CALC-SCNC: 4 MMOL/L (ref 5–15)
AST SERPL-CCNC: 31 U/L (ref 15–37)
BILIRUB SERPL-MCNC: 0.4 MG/DL (ref 0.2–1)
BUN SERPL-MCNC: 10 MG/DL (ref 6–20)
BUN/CREAT SERPL: 11 (ref 12–20)
CALCIUM SERPL-MCNC: 9.2 MG/DL (ref 8.5–10.1)
CHLORIDE SERPL-SCNC: 100 MMOL/L (ref 97–108)
CHOLEST SERPL-MCNC: 143 MG/DL
CO2 SERPL-SCNC: 33 MMOL/L (ref 21–32)
CREAT SERPL-MCNC: 0.91 MG/DL (ref 0.55–1.02)
EST. AVERAGE GLUCOSE BLD GHB EST-MCNC: 114 MG/DL
GLOBULIN SER CALC-MCNC: 3.2 G/DL (ref 2–4)
GLUCOSE SERPL-MCNC: 98 MG/DL (ref 65–100)
HBA1C MFR BLD: 5.6 % (ref 4–5.6)
HDLC SERPL-MCNC: 44 MG/DL
HDLC SERPL: 3.3 (ref 0–5)
LDLC SERPL CALC-MCNC: 85.4 MG/DL (ref 0–100)
POTASSIUM SERPL-SCNC: 4.3 MMOL/L (ref 3.5–5.1)
PROT SERPL-MCNC: 6.7 G/DL (ref 6.4–8.2)
SODIUM SERPL-SCNC: 137 MMOL/L (ref 136–145)
TRIGL SERPL-MCNC: 68 MG/DL
VLDLC SERPL CALC-MCNC: 13.6 MG/DL

## 2023-10-20 DIAGNOSIS — R74.8 ALKALINE PHOSPHATASE ELEVATION: Primary | ICD-10-CM

## 2023-10-23 DIAGNOSIS — M25.572 CHRONIC PAIN OF BOTH ANKLES: Primary | ICD-10-CM

## 2023-10-23 DIAGNOSIS — G89.29 CHRONIC PAIN OF BOTH ANKLES: Primary | ICD-10-CM

## 2023-10-23 DIAGNOSIS — M25.571 CHRONIC PAIN OF BOTH ANKLES: Primary | ICD-10-CM

## 2023-12-04 DIAGNOSIS — R05.9 COUGH, UNSPECIFIED: ICD-10-CM

## 2023-12-04 RX ORDER — BUDESONIDE AND FORMOTEROL FUMARATE DIHYDRATE 160; 4.5 UG/1; UG/1
AEROSOL RESPIRATORY (INHALATION)
Qty: 10.2 EACH | Refills: 1 | Status: SHIPPED | OUTPATIENT
Start: 2023-12-04

## 2024-01-19 ENCOUNTER — HOSPITAL ENCOUNTER (OUTPATIENT)
Facility: HOSPITAL | Age: 64
Discharge: HOME | End: 2024-01-19
Attending: INTERNAL MEDICINE
Payer: COMMERCIAL

## 2024-01-19 DIAGNOSIS — Z87.891 PERSONAL HISTORY OF TOBACCO USE: ICD-10-CM

## 2024-01-19 PROCEDURE — 71271 CT THORAX LUNG CANCER SCR C-: CPT

## 2024-02-05 ENCOUNTER — TELEPHONE (OUTPATIENT)
Age: 64
End: 2024-02-05

## 2024-02-05 NOTE — TELEPHONE ENCOUNTER
----- Message from Iram Casper sent at 2/2/2024  3:26 PM EST -----  Subject: Message to Provider    QUESTIONS  Information for Provider? patient calling stating she had blood work done   at her last office visit in October 2023 and she is wondering if someone   is able to mail her the results? please call patient  ---------------------------------------------------------------------------  --------------  CALL BACK INFO  [phone]; OK to leave message on voicemail  ---------------------------------------------------------------------------  --------------  SCRIPT ANSWERS  Relationship to Patient? Self

## 2024-06-14 DIAGNOSIS — E78.2 MIXED HYPERLIPIDEMIA: ICD-10-CM

## 2024-06-14 RX ORDER — PRAVASTATIN SODIUM 20 MG
20 TABLET ORAL NIGHTLY
Qty: 90 TABLET | Refills: 0 | Status: SHIPPED | OUTPATIENT
Start: 2024-06-14

## 2024-06-14 RX ORDER — DILTIAZEM HYDROCHLORIDE 240 MG/1
CAPSULE, EXTENDED RELEASE ORAL
Qty: 90 CAPSULE | Refills: 0 | Status: SHIPPED | OUTPATIENT
Start: 2024-06-14

## 2024-07-17 ENCOUNTER — TELEPHONE (OUTPATIENT)
Age: 64
End: 2024-07-17

## 2024-09-10 DIAGNOSIS — E78.2 MIXED HYPERLIPIDEMIA: ICD-10-CM

## 2024-09-10 RX ORDER — PRAVASTATIN SODIUM 20 MG
20 TABLET ORAL NIGHTLY
Qty: 90 TABLET | Refills: 0 | OUTPATIENT
Start: 2024-09-10

## 2024-09-10 RX ORDER — DILTIAZEM HYDROCHLORIDE 240 MG/1
CAPSULE, EXTENDED RELEASE ORAL
Qty: 90 CAPSULE | Refills: 0 | OUTPATIENT
Start: 2024-09-10

## 2024-09-19 DIAGNOSIS — E78.2 MIXED HYPERLIPIDEMIA: ICD-10-CM

## 2024-09-19 RX ORDER — PRAVASTATIN SODIUM 20 MG
20 TABLET ORAL NIGHTLY
Qty: 90 TABLET | Refills: 0 | Status: SHIPPED | OUTPATIENT
Start: 2024-09-19

## 2024-09-19 RX ORDER — DILTIAZEM HYDROCHLORIDE 240 MG/1
240 CAPSULE, EXTENDED RELEASE ORAL DAILY
Qty: 90 CAPSULE | Refills: 0 | Status: SHIPPED | OUTPATIENT
Start: 2024-09-19 | End: 2024-12-18

## 2024-12-14 ENCOUNTER — TELEPHONE (OUTPATIENT)
Facility: CLINIC | Age: 64
End: 2024-12-14

## 2024-12-14 DIAGNOSIS — E78.2 MIXED HYPERLIPIDEMIA: ICD-10-CM

## 2024-12-15 RX ORDER — DILTIAZEM HYDROCHLORIDE 240 MG/1
CAPSULE, EXTENDED RELEASE ORAL DAILY
Qty: 90 CAPSULE | Refills: 0 | OUTPATIENT
Start: 2024-12-15

## 2024-12-15 RX ORDER — PRAVASTATIN SODIUM 20 MG
20 TABLET ORAL NIGHTLY
Qty: 90 TABLET | Refills: 0 | OUTPATIENT
Start: 2024-12-15

## 2024-12-17 DIAGNOSIS — E78.2 MIXED HYPERLIPIDEMIA: ICD-10-CM

## 2024-12-17 NOTE — TELEPHONE ENCOUNTER
Medication refill     Pravastatin Sodium 20 mg Oral NIGHTLY    dilTIAZem HCl ER Beads Oral DAILY    [id number] - [place], [place] - [address] - P [phone] - F [phone]    Patient stated she made an appointment for January

## 2024-12-19 DIAGNOSIS — E78.2 MIXED HYPERLIPIDEMIA: ICD-10-CM

## 2024-12-19 RX ORDER — DILTIAZEM HYDROCHLORIDE 240 MG/1
240 CAPSULE, EXTENDED RELEASE ORAL DAILY
Qty: 90 CAPSULE | Refills: 0 | Status: SHIPPED | OUTPATIENT
Start: 2024-12-19 | End: 2025-03-19

## 2024-12-19 RX ORDER — PRAVASTATIN SODIUM 20 MG
20 TABLET ORAL NIGHTLY
Qty: 90 TABLET | Refills: 0 | Status: SHIPPED | OUTPATIENT
Start: 2024-12-19

## 2024-12-24 RX ORDER — PRAVASTATIN SODIUM 20 MG
20 TABLET ORAL NIGHTLY
Qty: 90 TABLET | Refills: 0 | Status: SHIPPED | OUTPATIENT
Start: 2024-12-24

## 2024-12-24 RX ORDER — DILTIAZEM HYDROCHLORIDE 240 MG/1
240 CAPSULE, EXTENDED RELEASE ORAL DAILY
Qty: 90 CAPSULE | Refills: 0 | Status: SHIPPED | OUTPATIENT
Start: 2024-12-24 | End: 2025-03-24

## 2025-04-29 ENCOUNTER — OFFICE VISIT (OUTPATIENT)
Facility: CLINIC | Age: 65
End: 2025-04-29
Payer: COMMERCIAL

## 2025-04-29 VITALS
HEART RATE: 63 BPM | BODY MASS INDEX: 48.68 KG/M2 | RESPIRATION RATE: 14 BRPM | HEIGHT: 62 IN | WEIGHT: 264.5 LBS | SYSTOLIC BLOOD PRESSURE: 123 MMHG | OXYGEN SATURATION: 88 % | DIASTOLIC BLOOD PRESSURE: 85 MMHG

## 2025-04-29 DIAGNOSIS — E78.2 MIXED HYPERLIPIDEMIA: ICD-10-CM

## 2025-04-29 DIAGNOSIS — I87.2 VENOUS STASIS DERMATITIS OF BOTH LOWER EXTREMITIES: Primary | ICD-10-CM

## 2025-04-29 DIAGNOSIS — J44.9 CHRONIC OBSTRUCTIVE PULMONARY DISEASE, UNSPECIFIED COPD TYPE (HCC): ICD-10-CM

## 2025-04-29 DIAGNOSIS — I10 PRIMARY HYPERTENSION: ICD-10-CM

## 2025-04-29 DIAGNOSIS — Z91.89 COMPLIANCE WITH MEDICATION REGIMEN: ICD-10-CM

## 2025-04-29 DIAGNOSIS — F33.1 MODERATE EPISODE OF RECURRENT MAJOR DEPRESSIVE DISORDER (HCC): ICD-10-CM

## 2025-04-29 DIAGNOSIS — E03.9 ACQUIRED HYPOTHYROIDISM: ICD-10-CM

## 2025-04-29 PROCEDURE — 99215 OFFICE O/P EST HI 40 MIN: CPT | Performed by: INTERNAL MEDICINE

## 2025-04-29 PROCEDURE — 3079F DIAST BP 80-89 MM HG: CPT | Performed by: INTERNAL MEDICINE

## 2025-04-29 PROCEDURE — 3074F SYST BP LT 130 MM HG: CPT | Performed by: INTERNAL MEDICINE

## 2025-04-29 RX ORDER — DILTIAZEM HYDROCHLORIDE 240 MG/1
240 CAPSULE, EXTENDED RELEASE ORAL DAILY
Qty: 90 CAPSULE | Refills: 3 | Status: SHIPPED | OUTPATIENT
Start: 2025-04-29 | End: 2025-07-28

## 2025-04-29 RX ORDER — FLUTICASONE FUROATE, UMECLIDINIUM BROMIDE AND VILANTEROL TRIFENATATE 100; 62.5; 25 UG/1; UG/1; UG/1
1 POWDER RESPIRATORY (INHALATION) DAILY
Qty: 1 EACH | Refills: 0 | Status: SHIPPED | OUTPATIENT
Start: 2025-04-29

## 2025-04-29 RX ORDER — PRAVASTATIN SODIUM 20 MG
20 TABLET ORAL NIGHTLY
Qty: 90 TABLET | Refills: 0 | Status: SHIPPED | OUTPATIENT
Start: 2025-04-29

## 2025-04-29 RX ORDER — ALBUTEROL SULFATE 90 UG/1
2 INHALANT RESPIRATORY (INHALATION) 4 TIMES DAILY PRN
Qty: 18 G | Refills: 0 | Status: SHIPPED | OUTPATIENT
Start: 2025-04-29

## 2025-04-29 RX ORDER — DEXTROAMPHETAMINE SACCHARATE, AMPHETAMINE ASPARTATE, DEXTROAMPHETAMINE SULFATE AND AMPHETAMINE SULFATE 5; 5; 5; 5 MG/1; MG/1; MG/1; MG/1
1 TABLET ORAL 3 TIMES DAILY
COMMUNITY
Start: 2025-04-17

## 2025-04-29 ASSESSMENT — PATIENT HEALTH QUESTIONNAIRE - PHQ9
SUM OF ALL RESPONSES TO PHQ QUESTIONS 1-9: 6
1. LITTLE INTEREST OR PLEASURE IN DOING THINGS: NEARLY EVERY DAY
SUM OF ALL RESPONSES TO PHQ QUESTIONS 1-9: 6
2. FEELING DOWN, DEPRESSED OR HOPELESS: NEARLY EVERY DAY

## 2025-04-29 NOTE — PROGRESS NOTES
ASSESSMENT & PLAN:    Patient was seen last October 2023.  Prior to this she was seen June 2022.      1. Venous stasis dermatitis of both lower extremities  New problem hyperpigmentation right greater than left along with +2 edema.  Will check Doppler on right leg with underlying BMI 48 risk for DVT    Discussed with patient compression stockings however will need to depend on  to assist with pulling on and taking off.  Also discussed potentially using Ace bandage to provide some compression to decrease  edema and progression    Requested that she make a follow-up with me in 3 to 4 weeks to reassess.   A follow-up visit has not been placed from her upon her discharge today.    -     Compression Stockings MISC; Starting Tue 4/29/2025, Disp-1 each, R-0, PrintCompression hose stocking bilateral closed toe to knee please size pt  and give 15 mmHg (mm mercury of pressure) . Please 1 pairs dispensed. Put on during the day and off at night  -     Vascular duplex lower extremity venous right; Future  2. Moderate episode of recurrent major depressive disorder (HCC)  She is being followed by psychiatry.  Continue Seroquel, trazodone as prescribed    3. Chronic obstructive pulmonary disease, unspecified COPD type (HCC)  She did not follow-up with Dr. Blackman/pulmonary  Discussed with patient and will make follow-up appointment  I refilled her albuterol and Trelegy to bridge her      -     fluticasone-umeclidin-vilant (TRELEGY ELLIPTA) 100-62.5-25 MCG/ACT AEPB inhaler; Inhale 1 puff into the lungs daily RINSE mouth after use    Bridging for pulmonary, Dr. Blackman, Disp-1 each, R-0Normal  -     albuterol sulfate HFA (VENTOLIN HFA) 108 (90 Base) MCG/ACT inhaler; Inhale 2 puffs into the lungs 4 times daily as needed for Wheezing, Disp-18 g, R-0Normal  -     [facility] - Tianna Blackman MD, Pulmonology, [place]    4. Compliance with medication regimen  Patient has not made follow-up appointments with me nor her specialists.

## 2025-04-30 LAB
ALBUMIN SERPL-MCNC: 3.5 G/DL (ref 3.5–5)
ALBUMIN/GLOB SERPL: 0.9 (ref 1.1–2.2)
ALP SERPL-CCNC: 183 U/L (ref 45–117)
ALT SERPL-CCNC: 17 U/L (ref 12–78)
ANION GAP SERPL CALC-SCNC: 3 MMOL/L (ref 2–12)
AST SERPL-CCNC: 20 U/L (ref 15–37)
BILIRUB SERPL-MCNC: 0.9 MG/DL (ref 0.2–1)
BUN SERPL-MCNC: 9 MG/DL (ref 6–20)
BUN/CREAT SERPL: 8 (ref 12–20)
CALCIUM SERPL-MCNC: 9.3 MG/DL (ref 8.5–10.1)
CHLORIDE SERPL-SCNC: 98 MMOL/L (ref 97–108)
CHOLEST SERPL-MCNC: 164 MG/DL
CO2 SERPL-SCNC: 34 MMOL/L (ref 21–32)
CREAT SERPL-MCNC: 1.15 MG/DL (ref 0.55–1.02)
GLOBULIN SER CALC-MCNC: 3.9 G/DL (ref 2–4)
GLUCOSE SERPL-MCNC: 89 MG/DL (ref 65–100)
HDLC SERPL-MCNC: 43 MG/DL
HDLC SERPL: 3.8 (ref 0–5)
LDLC SERPL CALC-MCNC: 105.2 MG/DL (ref 0–100)
POTASSIUM SERPL-SCNC: 4.3 MMOL/L (ref 3.5–5.1)
PROT SERPL-MCNC: 7.4 G/DL (ref 6.4–8.2)
SODIUM SERPL-SCNC: 135 MMOL/L (ref 136–145)
TRIGL SERPL-MCNC: 79 MG/DL
VLDLC SERPL CALC-MCNC: 15.8 MG/DL

## 2025-05-02 ENCOUNTER — RESULTS FOLLOW-UP (OUTPATIENT)
Facility: CLINIC | Age: 65
End: 2025-05-02

## 2025-05-02 DIAGNOSIS — R74.8 ELEVATED ALKALINE PHOSPHATASE LEVEL: Primary | ICD-10-CM

## 2025-05-23 ENCOUNTER — TELEPHONE (OUTPATIENT)
Facility: CLINIC | Age: 65
End: 2025-05-23

## 2025-05-23 NOTE — TELEPHONE ENCOUNTER
Patient called to make an appointment which I scheduled in August, but she said the Dr said schedule it for 40 mins because they have a lot to talk about, I just want to verify if this is correct and if it is then we need to update the length of the appointment time

## 2025-05-28 DIAGNOSIS — J44.9 CHRONIC OBSTRUCTIVE PULMONARY DISEASE, UNSPECIFIED COPD TYPE (HCC): ICD-10-CM

## 2025-05-28 RX ORDER — ALBUTEROL SULFATE 90 UG/1
2 INHALANT RESPIRATORY (INHALATION) 4 TIMES DAILY PRN
Qty: 18 EACH | Refills: 1 | Status: SHIPPED | OUTPATIENT
Start: 2025-05-28

## 2025-05-28 RX ORDER — FLUTICASONE FUROATE, UMECLIDINIUM BROMIDE AND VILANTEROL TRIFENATATE 100; 62.5; 25 UG/1; UG/1; UG/1
1 POWDER RESPIRATORY (INHALATION) DAILY
Qty: 60 EACH | Refills: 1 | Status: SHIPPED | OUTPATIENT
Start: 2025-05-28

## 2025-07-21 ENCOUNTER — TELEPHONE (OUTPATIENT)
Facility: CLINIC | Age: 65
End: 2025-07-21

## 2025-07-21 NOTE — TELEPHONE ENCOUNTER
[facility] inform [facility] provider that he will be sending over the patient death certificate. Any question his number is   Kalyan:[phone]

## 2025-07-23 DIAGNOSIS — J44.9 CHRONIC OBSTRUCTIVE PULMONARY DISEASE, UNSPECIFIED COPD TYPE (HCC): ICD-10-CM

## 2025-07-23 RX ORDER — FLUTICASONE FUROATE, UMECLIDINIUM BROMIDE AND VILANTEROL TRIFENATATE 100; 62.5; 25 UG/1; UG/1; UG/1
1 POWDER RESPIRATORY (INHALATION) DAILY
Qty: 60 EACH | Refills: 1 | Status: SHIPPED | OUTPATIENT
Start: 2025-07-23

## (undated) DEVICE — STERILE POLYISOPRENE POWDER-FREE SURGICAL GLOVES WITH EMOLLIENT COATING: Brand: PROTEXIS

## (undated) DEVICE — INFECTION CONTROL KIT SYS

## (undated) DEVICE — BAG COLLECTION FLD OR-TBL NS --

## (undated) DEVICE — JELLY,LUBE,STERILE,FLIP TOP,TUBE,4-OZ: Brand: MEDLINE

## (undated) DEVICE — SKIN MARKER FINE TIP WITH RULER: Brand: DEVON

## (undated) DEVICE — SOLUTION IRRIGATION H2O 0797305] ICU MEDICAL INC]

## (undated) DEVICE — CYSTO/BLADDER IRRIGATION SET, REGULATING CLAMP

## (undated) DEVICE — CYSTOSCOPY PACK: Brand: CONVERTORS

## (undated) DEVICE — GOWN,SIRUS,FABRNF,XL,20/CS: Brand: MEDLINE

## (undated) DEVICE — OPEN-END URETERAL CATHETER: Brand: COOK

## (undated) DEVICE — Z DISCONTINUEDSOLUTION PREP 2OZ 10% POVIDONE IOD SCR CAP BTL

## (undated) DEVICE — DEVON™ KNEE AND BODY STRAP 60" X 3" (1.5 M X 7.6 CM): Brand: DEVON

## (undated) DEVICE — RADIFOCUS GLIDEWIRE: Brand: GLIDEWIRE

## (undated) DEVICE — GDWIRE UROL STR 150CM FLX TP -- BX/5 SENSOR

## (undated) DEVICE — COVER LT HNDL BLU PLAS

## (undated) DEVICE — MEDI-VAC NON-CONDUCTIVE SUCTION TUBING: Brand: CARDINAL HEALTH

## (undated) DEVICE — (D)SYR 10ML 1/5ML GRAD NSAF -- PKGING CHANGE USE ITEM 338027